# Patient Record
Sex: MALE | Race: WHITE | Employment: UNEMPLOYED | URBAN - METROPOLITAN AREA
[De-identification: names, ages, dates, MRNs, and addresses within clinical notes are randomized per-mention and may not be internally consistent; named-entity substitution may affect disease eponyms.]

---

## 2021-11-30 ENCOUNTER — TELEPHONE (OUTPATIENT)
Dept: FAMILY MEDICINE CLINIC | Facility: CLINIC | Age: 61
End: 2021-11-30

## 2021-11-30 DIAGNOSIS — R73.01 ELEVATED FASTING BLOOD SUGAR: Primary | ICD-10-CM

## 2021-12-01 ENCOUNTER — OFFICE VISIT (OUTPATIENT)
Dept: FAMILY MEDICINE CLINIC | Facility: CLINIC | Age: 61
End: 2021-12-01
Payer: COMMERCIAL

## 2021-12-01 ENCOUNTER — HOSPITAL ENCOUNTER (OUTPATIENT)
Dept: GENERAL RADIOLOGY | Facility: HOSPITAL | Age: 61
Discharge: HOME | End: 2021-12-01
Attending: FAMILY MEDICINE
Payer: COMMERCIAL

## 2021-12-01 ENCOUNTER — LAB ENCOUNTER (OUTPATIENT)
Dept: LAB | Facility: HOSPITAL | Age: 61
End: 2021-12-01
Attending: FAMILY MEDICINE
Payer: COMMERCIAL

## 2021-12-01 VITALS
OXYGEN SATURATION: 98 % | WEIGHT: 236 LBS | HEIGHT: 70.5 IN | SYSTOLIC BLOOD PRESSURE: 148 MMHG | BODY MASS INDEX: 33.41 KG/M2 | DIASTOLIC BLOOD PRESSURE: 74 MMHG | TEMPERATURE: 97 F | RESPIRATION RATE: 16 BRPM | HEART RATE: 82 BPM

## 2021-12-01 DIAGNOSIS — Z01.812 PRE-OPERATIVE LABORATORY EXAMINATION: ICD-10-CM

## 2021-12-01 DIAGNOSIS — Z01.818 PREOP EXAMINATION: ICD-10-CM

## 2021-12-01 DIAGNOSIS — M17.12 ARTHRITIS OF LEFT KNEE: ICD-10-CM

## 2021-12-01 DIAGNOSIS — R73.01 ELEVATED FASTING BLOOD SUGAR: ICD-10-CM

## 2021-12-01 DIAGNOSIS — Z01.812 PRE-OPERATIVE LABORATORY EXAMINATION: Primary | ICD-10-CM

## 2021-12-01 PROCEDURE — 86850 RBC ANTIBODY SCREEN: CPT

## 2021-12-01 PROCEDURE — 3077F SYST BP >= 140 MM HG: CPT | Performed by: FAMILY MEDICINE

## 2021-12-01 PROCEDURE — 99203 OFFICE O/P NEW LOW 30 MIN: CPT | Performed by: FAMILY MEDICINE

## 2021-12-01 PROCEDURE — 86900 BLOOD TYPING SEROLOGIC ABO: CPT

## 2021-12-01 PROCEDURE — 83036 HEMOGLOBIN GLYCOSYLATED A1C: CPT

## 2021-12-01 PROCEDURE — 71046 X-RAY EXAM CHEST 2 VIEWS: CPT | Performed by: FAMILY MEDICINE

## 2021-12-01 PROCEDURE — 36415 COLL VENOUS BLD VENIPUNCTURE: CPT

## 2021-12-01 PROCEDURE — 3078F DIAST BP <80 MM HG: CPT | Performed by: FAMILY MEDICINE

## 2021-12-01 PROCEDURE — 81001 URINALYSIS AUTO W/SCOPE: CPT

## 2021-12-01 PROCEDURE — 85610 PROTHROMBIN TIME: CPT

## 2021-12-01 PROCEDURE — 85025 COMPLETE CBC W/AUTO DIFF WBC: CPT

## 2021-12-01 PROCEDURE — 87081 CULTURE SCREEN ONLY: CPT

## 2021-12-01 PROCEDURE — 3008F BODY MASS INDEX DOCD: CPT | Performed by: FAMILY MEDICINE

## 2021-12-01 PROCEDURE — 86901 BLOOD TYPING SEROLOGIC RH(D): CPT

## 2021-12-01 PROCEDURE — 93005 ELECTROCARDIOGRAM TRACING: CPT

## 2021-12-01 PROCEDURE — 85730 THROMBOPLASTIN TIME PARTIAL: CPT

## 2021-12-01 PROCEDURE — 93010 ELECTROCARDIOGRAM REPORT: CPT | Performed by: FAMILY MEDICINE

## 2021-12-01 PROCEDURE — 87086 URINE CULTURE/COLONY COUNT: CPT

## 2021-12-01 PROCEDURE — 87077 CULTURE AEROBIC IDENTIFY: CPT

## 2021-12-01 PROCEDURE — 80053 COMPREHEN METABOLIC PANEL: CPT

## 2021-12-01 RX ORDER — MULTIVIT-MIN/IRON FUM/FOLIC AC 7.5 MG-4
1 TABLET ORAL DAILY
COMMUNITY

## 2021-12-01 RX ORDER — MELOXICAM 15 MG/1
15 TABLET ORAL DAILY
COMMUNITY

## 2021-12-01 NOTE — TELEPHONE ENCOUNTER
Surgery on 12/17/21, Brandon Rashid with Dr. Raul Beatty @ [address] [place] [address]    H&P- complete  Labs- HGB (12.5), RDW-SD (53.6), RDW (16.0), PLT (564), FBS (106), A1C (6.0), MRSA neg, Urine Culture neg, BUN (20), BUN/Creat ratio (27.0), AST (14), albumin (3.0), globulin (5.0), A/G rati

## 2021-12-02 NOTE — H&P
[address] [facility] PRE-OP CLINIC [place]    PRE-OP NOTE    HPI:   I have been consulted by Dr. Thao Scott to see Mark Savage 59year old male for a preoperative evaluation and medical clearance.  Rusty Curiel has a long history of worsening severe degenerative arthritis l on file    Social Determinants of Health  Financial Resource Strain: Not on file  Food Insecurity: Not on file  Transportation Needs: Not on file  Physical Activity: Not on file  Stress: Not on file  Social Connections: Not on file  Intimate Partner Germaine Bigger erythema or exudate. Mouth:  No oral lesions or ulcerations, good dentition. NECK: Supple, no CLAD, no JVD, no carotid bruit, no thyromegaly. SKIN: No rashes, no skin lesion, no bruising, good turgor.   HEART:  Regular rate and rhythm, no murmurs, rubs o thoracic, intra-peritoneal): No  CAD: No  CHF: No  Stroke: No  DM on insulin: No  Serum Creatinine >2 mg/dl: No  Janell Whittaker has an RCRI score that is low risk and is at 0.9% risk for major cardiac event in the perioperative period.      Patient is medically opti

## 2021-12-02 NOTE — H&P (VIEW-ONLY)
[facility]    PRE-OP [facility]    HPI:   I have been consulted by Dr. Neil Babinski to see Tiana Benavidezmikael 59year old male for a preoperative evaluation and medical clearance.  Dayton Casanova has a long history of worsening severe degenerative arthritis l on file    Social Determinants of Health  Financial Resource Strain: Not on file  Food Insecurity: Not on file  Transportation Needs: Not on file  Physical Activity: Not on file  Stress: Not on file  Social Connections: Not on file  Intimate Partner Clotilde Cutler erythema or exudate. Mouth:  No oral lesions or ulcerations, good dentition. NECK: Supple, no CLAD, no JVD, no carotid bruit, no thyromegaly. SKIN: No rashes, no skin lesion, no bruising, good turgor.   HEART:  Regular rate and rhythm, no murmurs, rubs o thoracic, intra-peritoneal): No  CAD: No  CHF: No  Stroke: No  DM on insulin: No  Serum Creatinine >2 mg/dl: No  Sury Gustavo has an RCRI score that is low risk and is at 0.9% risk for major cardiac event in the perioperative period.      Patient is medically opti

## 2021-12-03 NOTE — TELEPHONE ENCOUNTER
Spoke to patient, went over test results and let him know he is clear for surgery per Dr. Jayshree Hanson. Patient needs to find a new PCP and make sure he follows up on A1C, HGB, and PLT count in the next couple of months.  All results mailed to patient's home add

## 2021-12-03 NOTE — TELEPHONE ENCOUNTER
Dr. Aida Muniz, please review:    Labs- HGB (12.5), RDW-SD (53.6), RDW (16.0), PLT (564), FBS (106), A1C (6.0), MRSA neg, Urine Culture neg, BUN (20), BUN/Creat ratio (27.0), AST (14), albumin (3.0), globulin (5.0), A/G ratio (0.6), PTT (35.4),   EKG- WNL  X-

## 2021-12-08 RX ORDER — CEPHALEXIN 500 MG/1
500 CAPSULE ORAL 4 TIMES DAILY
Qty: 36 CAPSULE | Refills: 0 | Status: ON HOLD | OUTPATIENT
Start: 2021-12-08 | End: 2021-12-17 | Stop reason: ALTCHOICE

## 2021-12-08 NOTE — TELEPHONE ENCOUNTER
Mireya Phillips MD  [facility] Pre-Op Clinic Clinical Staff  Pt with positive urine culture. Recommend treating with keflex 500 mg qid for 9 days.

## 2021-12-08 NOTE — TELEPHONE ENCOUNTER
Spoke to patient, let him know to  and start antibiotic for UTI today and he will take until day before surgery. Rx sent. Patient states he stopped Meloxicam two day ago and is only taking Tylenol Extra strength and is icing/elevating.  This is not h

## 2021-12-14 ENCOUNTER — LAB ENCOUNTER (OUTPATIENT)
Dept: LAB | Age: 61
End: 2021-12-14
Attending: STUDENT IN AN ORGANIZED HEALTH CARE EDUCATION/TRAINING PROGRAM
Payer: COMMERCIAL

## 2021-12-14 DIAGNOSIS — Z01.818 PREOP TESTING: ICD-10-CM

## 2021-12-17 ENCOUNTER — ANESTHESIA (OUTPATIENT)
Dept: SURGERY | Facility: HOSPITAL | Age: 61
DRG: 470 | End: 2021-12-17
Payer: COMMERCIAL

## 2021-12-17 ENCOUNTER — ANESTHESIA EVENT (OUTPATIENT)
Dept: SURGERY | Facility: HOSPITAL | Age: 61
DRG: 470 | End: 2021-12-17
Payer: COMMERCIAL

## 2021-12-17 ENCOUNTER — HOSPITAL ENCOUNTER (INPATIENT)
Facility: HOSPITAL | Age: 61
LOS: 1 days | Discharge: HOME | DRG: 470 | End: 2021-12-18
Attending: STUDENT IN AN ORGANIZED HEALTH CARE EDUCATION/TRAINING PROGRAM | Admitting: STUDENT IN AN ORGANIZED HEALTH CARE EDUCATION/TRAINING PROGRAM
Payer: COMMERCIAL

## 2021-12-17 ENCOUNTER — APPOINTMENT (OUTPATIENT)
Dept: GENERAL RADIOLOGY | Facility: HOSPITAL | Age: 61
DRG: 470 | End: 2021-12-17
Attending: STUDENT IN AN ORGANIZED HEALTH CARE EDUCATION/TRAINING PROGRAM
Payer: COMMERCIAL

## 2021-12-17 DIAGNOSIS — Z01.818 PREOP TESTING: Primary | ICD-10-CM

## 2021-12-17 PROBLEM — Z96.652 S/P TOTAL KNEE REPLACEMENT, LEFT: Status: ACTIVE | Noted: 2021-12-17

## 2021-12-17 PROCEDURE — 88305 TISSUE EXAM BY PATHOLOGIST: CPT | Performed by: STUDENT IN AN ORGANIZED HEALTH CARE EDUCATION/TRAINING PROGRAM

## 2021-12-17 PROCEDURE — 73560 X-RAY EXAM OF KNEE 1 OR 2: CPT | Performed by: STUDENT IN AN ORGANIZED HEALTH CARE EDUCATION/TRAINING PROGRAM

## 2021-12-17 PROCEDURE — 88311 DECALCIFY TISSUE: CPT | Performed by: STUDENT IN AN ORGANIZED HEALTH CARE EDUCATION/TRAINING PROGRAM

## 2021-12-17 PROCEDURE — 0SRD0J9 REPLACEMENT OF LEFT KNEE JOINT WITH SYNTHETIC SUBSTITUTE, CEMENTED, OPEN APPROACH: ICD-10-PCS | Performed by: STUDENT IN AN ORGANIZED HEALTH CARE EDUCATION/TRAINING PROGRAM

## 2021-12-17 DEVICE — REFOBACIN BC R 1X40 US: Type: IMPLANTABLE DEVICE | Status: FUNCTIONAL

## 2021-12-17 DEVICE — IMPLANTABLE DEVICE: Type: IMPLANTABLE DEVICE | Status: FUNCTIONAL

## 2021-12-17 RX ORDER — CELECOXIB 200 MG/1
200 CAPSULE ORAL 2 TIMES DAILY
Status: DISCONTINUED | OUTPATIENT
Start: 2021-12-17 | End: 2021-12-18

## 2021-12-17 RX ORDER — TRANEXAMIC ACID 10 MG/ML
INJECTION, SOLUTION INTRAVENOUS AS NEEDED
Status: DISCONTINUED | OUTPATIENT
Start: 2021-12-17 | End: 2021-12-17 | Stop reason: SURG

## 2021-12-17 RX ORDER — FAMOTIDINE 20 MG/1
20 TABLET ORAL ONCE
Status: COMPLETED | OUTPATIENT
Start: 2021-12-17 | End: 2021-12-17

## 2021-12-17 RX ORDER — SODIUM CHLORIDE 9 MG/ML
INJECTION, SOLUTION INTRAVENOUS CONTINUOUS
Status: DISCONTINUED | OUTPATIENT
Start: 2021-12-17 | End: 2021-12-18

## 2021-12-17 RX ORDER — ZOLPIDEM TARTRATE 5 MG/1
5 TABLET ORAL NIGHTLY PRN
Status: DISCONTINUED | OUTPATIENT
Start: 2021-12-17 | End: 2021-12-18

## 2021-12-17 RX ORDER — SCOLOPAMINE TRANSDERMAL SYSTEM 1 MG/1
1 PATCH, EXTENDED RELEASE TRANSDERMAL ONCE
Status: DISCONTINUED | OUTPATIENT
Start: 2021-12-17 | End: 2021-12-18

## 2021-12-17 RX ORDER — DIPHENHYDRAMINE HYDROCHLORIDE 50 MG/ML
12.5 INJECTION INTRAMUSCULAR; INTRAVENOUS EVERY 4 HOURS PRN
Status: DISCONTINUED | OUTPATIENT
Start: 2021-12-12 | End: 2021-12-18

## 2021-12-17 RX ORDER — HALOPERIDOL 5 MG/ML
0.25 INJECTION INTRAMUSCULAR ONCE AS NEEDED
Status: DISCONTINUED | OUTPATIENT
Start: 2021-12-17 | End: 2021-12-17 | Stop reason: HOSPADM

## 2021-12-17 RX ORDER — HYDROMORPHONE HYDROCHLORIDE 1 MG/ML
0.2 INJECTION, SOLUTION INTRAMUSCULAR; INTRAVENOUS; SUBCUTANEOUS EVERY 5 MIN PRN
Status: DISCONTINUED | OUTPATIENT
Start: 2021-12-17 | End: 2021-12-17 | Stop reason: HOSPADM

## 2021-12-17 RX ORDER — HYDROMORPHONE HYDROCHLORIDE 1 MG/ML
0.8 INJECTION, SOLUTION INTRAMUSCULAR; INTRAVENOUS; SUBCUTANEOUS EVERY 2 HOUR PRN
Status: DISCONTINUED | OUTPATIENT
Start: 2021-12-12 | End: 2021-12-18

## 2021-12-17 RX ORDER — CELECOXIB 200 MG/1
400 CAPSULE ORAL ONCE
Status: COMPLETED | OUTPATIENT
Start: 2021-12-17 | End: 2021-12-17

## 2021-12-17 RX ORDER — ASPIRIN 81 MG/1
81 TABLET ORAL 2 TIMES DAILY
Status: DISCONTINUED | OUTPATIENT
Start: 2021-12-17 | End: 2021-12-18

## 2021-12-17 RX ORDER — PROCHLORPERAZINE EDISYLATE 5 MG/ML
10 INJECTION INTRAMUSCULAR; INTRAVENOUS EVERY 6 HOURS PRN
Status: DISCONTINUED | OUTPATIENT
Start: 2021-12-17 | End: 2021-12-18

## 2021-12-17 RX ORDER — CEFAZOLIN SODIUM/WATER 2 G/20 ML
2 SYRINGE (ML) INTRAVENOUS ONCE
Status: COMPLETED | OUTPATIENT
Start: 2021-12-17 | End: 2021-12-17

## 2021-12-17 RX ORDER — HYDROMORPHONE HYDROCHLORIDE 1 MG/ML
0.4 INJECTION, SOLUTION INTRAMUSCULAR; INTRAVENOUS; SUBCUTANEOUS EVERY 5 MIN PRN
Status: DISCONTINUED | OUTPATIENT
Start: 2021-12-17 | End: 2021-12-17 | Stop reason: HOSPADM

## 2021-12-17 RX ORDER — ONDANSETRON 2 MG/ML
4 INJECTION INTRAMUSCULAR; INTRAVENOUS ONCE AS NEEDED
Status: DISCONTINUED | OUTPATIENT
Start: 2021-12-17 | End: 2021-12-17 | Stop reason: HOSPADM

## 2021-12-17 RX ORDER — MORPHINE SULFATE 10 MG/ML
6 INJECTION, SOLUTION INTRAMUSCULAR; INTRAVENOUS EVERY 10 MIN PRN
Status: DISCONTINUED | OUTPATIENT
Start: 2021-12-17 | End: 2021-12-17 | Stop reason: HOSPADM

## 2021-12-17 RX ORDER — BISACODYL 10 MG
10 SUPPOSITORY, RECTAL RECTAL
Status: DISCONTINUED | OUTPATIENT
Start: 2021-12-17 | End: 2021-12-18

## 2021-12-17 RX ORDER — HYDROMORPHONE HYDROCHLORIDE 1 MG/ML
0.4 INJECTION, SOLUTION INTRAMUSCULAR; INTRAVENOUS; SUBCUTANEOUS EVERY 2 HOUR PRN
Status: DISCONTINUED | OUTPATIENT
Start: 2021-12-12 | End: 2021-12-18

## 2021-12-17 RX ORDER — OXYCODONE HYDROCHLORIDE 5 MG/1
10 TABLET ORAL EVERY 4 HOURS PRN
Status: DISCONTINUED | OUTPATIENT
Start: 2021-12-12 | End: 2021-12-18

## 2021-12-17 RX ORDER — DIPHENHYDRAMINE HCL 25 MG
25 CAPSULE ORAL EVERY 4 HOURS PRN
Status: DISCONTINUED | OUTPATIENT
Start: 2021-12-12 | End: 2021-12-18

## 2021-12-17 RX ORDER — NALOXONE HYDROCHLORIDE 0.4 MG/ML
80 INJECTION, SOLUTION INTRAMUSCULAR; INTRAVENOUS; SUBCUTANEOUS AS NEEDED
Status: DISCONTINUED | OUTPATIENT
Start: 2021-12-17 | End: 2021-12-17 | Stop reason: HOSPADM

## 2021-12-17 RX ORDER — POLYETHYLENE GLYCOL 3350 17 G/17G
17 POWDER, FOR SOLUTION ORAL DAILY PRN
Status: DISCONTINUED | OUTPATIENT
Start: 2021-12-17 | End: 2021-12-18

## 2021-12-17 RX ORDER — HYDROMORPHONE HYDROCHLORIDE 1 MG/ML
0.6 INJECTION, SOLUTION INTRAMUSCULAR; INTRAVENOUS; SUBCUTANEOUS EVERY 5 MIN PRN
Status: DISCONTINUED | OUTPATIENT
Start: 2021-12-17 | End: 2021-12-17 | Stop reason: HOSPADM

## 2021-12-17 RX ORDER — LIDOCAINE HYDROCHLORIDE 10 MG/ML
INJECTION, SOLUTION EPIDURAL; INFILTRATION; INTRACAUDAL; PERINEURAL AS NEEDED
Status: DISCONTINUED | OUTPATIENT
Start: 2021-12-17 | End: 2021-12-17 | Stop reason: SURG

## 2021-12-17 RX ORDER — HYDROCODONE BITARTRATE AND ACETAMINOPHEN 5; 325 MG/1; MG/1
2 TABLET ORAL AS NEEDED
Status: DISCONTINUED | OUTPATIENT
Start: 2021-12-17 | End: 2021-12-17 | Stop reason: HOSPADM

## 2021-12-17 RX ORDER — SODIUM CHLORIDE, SODIUM LACTATE, POTASSIUM CHLORIDE, CALCIUM CHLORIDE 600; 310; 30; 20 MG/100ML; MG/100ML; MG/100ML; MG/100ML
INJECTION, SOLUTION INTRAVENOUS CONTINUOUS
Status: DISCONTINUED | OUTPATIENT
Start: 2021-12-17 | End: 2021-12-18

## 2021-12-17 RX ORDER — MORPHINE SULFATE 4 MG/ML
2 INJECTION, SOLUTION INTRAMUSCULAR; INTRAVENOUS EVERY 10 MIN PRN
Status: DISCONTINUED | OUTPATIENT
Start: 2021-12-17 | End: 2021-12-17 | Stop reason: HOSPADM

## 2021-12-17 RX ORDER — SODIUM CHLORIDE, SODIUM LACTATE, POTASSIUM CHLORIDE, CALCIUM CHLORIDE 600; 310; 30; 20 MG/100ML; MG/100ML; MG/100ML; MG/100ML
INJECTION, SOLUTION INTRAVENOUS CONTINUOUS
Status: DISCONTINUED | OUTPATIENT
Start: 2021-12-17 | End: 2021-12-17 | Stop reason: HOSPADM

## 2021-12-17 RX ORDER — HYDRALAZINE HYDROCHLORIDE 50 MG/1
50 TABLET, FILM COATED ORAL EVERY 8 HOURS PRN
Status: DISCONTINUED | OUTPATIENT
Start: 2021-12-17 | End: 2021-12-18

## 2021-12-17 RX ORDER — SENNOSIDES 8.6 MG
17.2 TABLET ORAL NIGHTLY PRN
Status: DISCONTINUED | OUTPATIENT
Start: 2021-12-17 | End: 2021-12-18

## 2021-12-17 RX ORDER — MIDAZOLAM HYDROCHLORIDE 1 MG/ML
INJECTION INTRAMUSCULAR; INTRAVENOUS AS NEEDED
Status: DISCONTINUED | OUTPATIENT
Start: 2021-12-17 | End: 2021-12-17 | Stop reason: SURG

## 2021-12-17 RX ORDER — BUPIVACAINE HYDROCHLORIDE 7.5 MG/ML
INJECTION, SOLUTION INTRASPINAL AS NEEDED
Status: DISCONTINUED | OUTPATIENT
Start: 2021-12-17 | End: 2021-12-17 | Stop reason: SURG

## 2021-12-17 RX ORDER — ACETAMINOPHEN 500 MG
1000 TABLET ORAL EVERY 6 HOURS
Status: DISCONTINUED | OUTPATIENT
Start: 2021-12-17 | End: 2021-12-18

## 2021-12-17 RX ORDER — SODIUM PHOSPHATE, DIBASIC AND SODIUM PHOSPHATE, MONOBASIC 7; 19 G/133ML; G/133ML
1 ENEMA RECTAL ONCE AS NEEDED
Status: DISCONTINUED | OUTPATIENT
Start: 2021-12-17 | End: 2021-12-18

## 2021-12-17 RX ORDER — OXYCODONE HYDROCHLORIDE 5 MG/1
10 TABLET ORAL ONCE
Status: COMPLETED | OUTPATIENT
Start: 2021-12-17 | End: 2021-12-17

## 2021-12-17 RX ORDER — METOCLOPRAMIDE 10 MG/1
10 TABLET ORAL ONCE
Status: COMPLETED | OUTPATIENT
Start: 2021-12-17 | End: 2021-12-17

## 2021-12-17 RX ORDER — PROCHLORPERAZINE EDISYLATE 5 MG/ML
5 INJECTION INTRAMUSCULAR; INTRAVENOUS ONCE AS NEEDED
Status: DISCONTINUED | OUTPATIENT
Start: 2021-12-17 | End: 2021-12-17 | Stop reason: HOSPADM

## 2021-12-17 RX ORDER — OXYCODONE HYDROCHLORIDE 5 MG/1
5 TABLET ORAL EVERY 4 HOURS PRN
Status: DISCONTINUED | OUTPATIENT
Start: 2021-12-12 | End: 2021-12-18

## 2021-12-17 RX ORDER — CEFAZOLIN SODIUM/WATER 2 G/20 ML
2 SYRINGE (ML) INTRAVENOUS EVERY 8 HOURS
Status: COMPLETED | OUTPATIENT
Start: 2021-12-17 | End: 2021-12-18

## 2021-12-17 RX ORDER — TRANEXAMIC ACID 10 MG/ML
1000 INJECTION, SOLUTION INTRAVENOUS 2 TIMES DAILY
Status: DISCONTINUED | OUTPATIENT
Start: 2021-12-17 | End: 2021-12-17

## 2021-12-17 RX ORDER — ONDANSETRON 2 MG/ML
4 INJECTION INTRAMUSCULAR; INTRAVENOUS EVERY 4 HOURS PRN
Status: DISCONTINUED | OUTPATIENT
Start: 2021-12-17 | End: 2021-12-18

## 2021-12-17 RX ORDER — MORPHINE SULFATE 4 MG/ML
4 INJECTION, SOLUTION INTRAMUSCULAR; INTRAVENOUS EVERY 10 MIN PRN
Status: DISCONTINUED | OUTPATIENT
Start: 2021-12-17 | End: 2021-12-17 | Stop reason: HOSPADM

## 2021-12-17 RX ORDER — DIPHENHYDRAMINE HYDROCHLORIDE 50 MG/ML
25 INJECTION INTRAMUSCULAR; INTRAVENOUS ONCE AS NEEDED
Status: ACTIVE | OUTPATIENT
Start: 2021-12-17 | End: 2021-12-17

## 2021-12-17 RX ORDER — ACETAMINOPHEN 500 MG
1000 TABLET ORAL ONCE
Status: DISCONTINUED | OUTPATIENT
Start: 2021-12-17 | End: 2021-12-17 | Stop reason: ALTCHOICE

## 2021-12-17 RX ORDER — ACETAMINOPHEN 500 MG
1000 TABLET ORAL ONCE
Status: DISCONTINUED | OUTPATIENT
Start: 2021-12-17 | End: 2021-12-17 | Stop reason: HOSPADM

## 2021-12-17 RX ORDER — TRANEXAMIC ACID 10 MG/ML
1000 INJECTION, SOLUTION INTRAVENOUS ONCE
Status: COMPLETED | OUTPATIENT
Start: 2021-12-17 | End: 2021-12-17

## 2021-12-17 RX ORDER — HYDROCODONE BITARTRATE AND ACETAMINOPHEN 5; 325 MG/1; MG/1
1 TABLET ORAL AS NEEDED
Status: DISCONTINUED | OUTPATIENT
Start: 2021-12-17 | End: 2021-12-17 | Stop reason: HOSPADM

## 2021-12-17 RX ADMIN — MIDAZOLAM HYDROCHLORIDE 2 MG: 1 INJECTION INTRAMUSCULAR; INTRAVENOUS at 13:36:00

## 2021-12-17 RX ADMIN — LIDOCAINE HYDROCHLORIDE 30 MG: 10 INJECTION, SOLUTION EPIDURAL; INFILTRATION; INTRACAUDAL; PERINEURAL at 13:38:00

## 2021-12-17 RX ADMIN — BUPIVACAINE HYDROCHLORIDE 1.6 ML: 7.5 INJECTION, SOLUTION INTRASPINAL at 13:38:00

## 2021-12-17 RX ADMIN — TRANEXAMIC ACID 1000 MG: 10 INJECTION, SOLUTION INTRAVENOUS at 13:33:00

## 2021-12-17 RX ADMIN — CEFAZOLIN SODIUM/WATER 2 G: 2 G/20 ML SYRINGE (ML) INTRAVENOUS at 13:50:00

## 2021-12-17 NOTE — ANESTHESIA PROCEDURE NOTES
Spinal Block  Performed by: Reese Pena CRNA  Authorized by: Flakito Aparicio MD       General Information and Staff    Start Time:  12/17/2021 1:36 PM  End Time:  12/17/2021 1:38 PM  Anesthesiologist:  Flakito Aparicio MD  CRNA:  Yaakov Washington

## 2021-12-17 NOTE — PROGRESS NOTES
[facility] HOSP - [facility]    Progress Note    Deo Savage Patient Status:  Inpatient    1960 MRN [id number]   Location [address] UNIT Attending Marci Todd MD   Hosp Day # 0 PCP Unknown Pcp       Subjective: Plan:     Arthritis of left knee  Now replaced      S/P total knee replacement, left  Stable post op.  Aspirin for anticoagulation        Postoperative Recommendations:  Anticoagulation / DVT prophylaxis: SCDs, ASA per Dr. Oralee Apley  Pain Control: per ortho  G

## 2021-12-17 NOTE — INTERVAL H&P NOTE
Pre-op Diagnosis: left knee osteoarthritis    The above referenced H&P was reviewed by Paolo Luo MD on 12/17/2021, the patient was examined and no significant changes have occurred in the patient's condition since the H&P was performed.   I discus

## 2021-12-17 NOTE — ANESTHESIA POSTPROCEDURE EVALUATION
Patient: Jerardo Gonzalez    Procedure Summary     Date: 12/17/21 Room / Location: [facility] MAIN OR 02 / [facility] MAIN OR    Anesthesia Start: 1333 Anesthesia Stop: 0869    Procedure: left total knee arthroplasty (Left Knee) Diagnosis: (left knee osteoarthritis)

## 2021-12-17 NOTE — OPERATIVE REPORT
[facility] at [address]: 12/17/2021    PREOPERATIVE DIAGNOSIS:   1. Left knee osteoarthritis    POST-OPERATIVE DIAGNOSIS:   1. Left knee osteoarthritis    PROCEDURE:  1.  Left primary total knee arthroplasty with pa extremity was exsanguinated using Esmarch bandage and the tourniquet was raised to 300mmHg. A midline incision was made medial to the tibial tubercle centered over patella taken down to the joint capsule of the knee.  Minimal subfascial flaps were create femur was appropriately sized based on posterior referencing. A tensioner device was inserted into the flexion space and tensioned at 90 degrees, establishing appropriate femoral external rotation, and the posterior referencing holes were drilled.  The ante (tibia, femur then patella. The knee was reduced with a trial insert in place and the cement was allowed to cure. Any excess cement was removed to minimize third body wear.  Once the cement cured, the tourniquet was released meticulous hemostasis was obtain

## 2021-12-17 NOTE — ANESTHESIA PREPROCEDURE EVALUATION
Anesthesia PreOp Note    HPI:     Raul Valera is a 59year old male who presents for preoperative consultation requested by: Charli Orozco MD    Date of Surgery: 12/17/2021    Procedure(s):  left total knee arthroplasty  Indication: left knee ost MD  clonidine/epinephrine/ropivacaine/ketorolac (CERTS) pain cocktail, , Intra-articular, Once (Intra-Op), Lenka Mcghee MD  Povidone-Iodine 10 % 17.5 mL in sodium chloride 0.9 % 500 mL irrigation, , Irrigation, Once (Intra-Op), Lenka Mcghee MD Lab Results   Component Value Date    INR 1.13 12/01/2021       Vital Signs: Body mass index is 33.24 kg/m². height is 1.791 m (5' 10.5\") and weight is 106.6 kg (235 lb). His oral temperature is 98.4 °F (36.9 °C).  His blood pressure is 171/78 (abno

## 2021-12-18 VITALS
TEMPERATURE: 99 F | RESPIRATION RATE: 18 BRPM | OXYGEN SATURATION: 92 % | BODY MASS INDEX: 33.27 KG/M2 | SYSTOLIC BLOOD PRESSURE: 137 MMHG | HEIGHT: 70.5 IN | WEIGHT: 235 LBS | DIASTOLIC BLOOD PRESSURE: 80 MMHG | HEART RATE: 97 BPM

## 2021-12-18 PROBLEM — Z01.818 PREOP TESTING: Status: ACTIVE | Noted: 2021-12-18

## 2021-12-18 PROCEDURE — 97161 PT EVAL LOW COMPLEX 20 MIN: CPT

## 2021-12-18 PROCEDURE — 97116 GAIT TRAINING THERAPY: CPT

## 2021-12-18 PROCEDURE — 97535 SELF CARE MNGMENT TRAINING: CPT

## 2021-12-18 PROCEDURE — 85027 COMPLETE CBC AUTOMATED: CPT | Performed by: FAMILY MEDICINE

## 2021-12-18 PROCEDURE — 97165 OT EVAL LOW COMPLEX 30 MIN: CPT

## 2021-12-18 PROCEDURE — 90471 IMMUNIZATION ADMIN: CPT

## 2021-12-18 PROCEDURE — 80048 BASIC METABOLIC PNL TOTAL CA: CPT | Performed by: FAMILY MEDICINE

## 2021-12-18 RX ORDER — ASPIRIN 81 MG/1
81 TABLET ORAL 2 TIMES DAILY
Qty: 100 TABLET | Refills: 0 | Status: SHIPPED | OUTPATIENT
Start: 2021-12-18 | End: 2021-12-18

## 2021-12-18 RX ORDER — OXYCODONE HYDROCHLORIDE 10 MG/1
10 TABLET ORAL EVERY 4 HOURS PRN
Qty: 40 TABLET | Refills: 0 | Status: SHIPPED | OUTPATIENT
Start: 2021-12-18

## 2021-12-18 RX ORDER — ASPIRIN 81 MG/1
162 TABLET ORAL 2 TIMES DAILY
Qty: 120 TABLET | Refills: 0 | Status: SHIPPED | OUTPATIENT
Start: 2021-12-18

## 2021-12-18 RX ORDER — NALOXONE HYDROCHLORIDE 4 MG/.1ML
4 SPRAY, METERED NASAL AS NEEDED
Qty: 1 KIT | Refills: 0 | Status: SHIPPED | OUTPATIENT
Start: 2021-12-18

## 2021-12-18 NOTE — PHYSICAL THERAPY NOTE
PHYSICAL THERAPY KNEE EVALUATION - INPATIENT       Room Number: 404/404-A  Evaluation Date: 12/18/2021  Type of Evaluation: Initial  Physician Order: PT Eval and Treat    Presenting Problem: L TKR      Reason for Therapy: Mobility Dysfunction and Discharge training;Family education;Range of motion;Strengthening;Transfer training;Balance training;Stoop training;Stair training  Rehab Potential : Good  Frequency (Obs): Daily       PHYSICAL THERAPY MEDICAL/SOCIAL HISTORY     History related to current admission: '6-Clicks' INPATIENT SHORT FORM - BASIC MOBILITY  How much difficulty does the patient currently have. ..   Patient Difficulty: Turning over in bed (including adjusting bedclothes, sheets and blankets)?: None   Patient Difficulty: Sitting down on and standin able to demonstrate transfers Sit to/from Stand at assistance level: modified independent     Goal #2  Current Status SBA with RW   Goal #3 Patient is able to ambulate 300 feet with assistive device at assistance level: modified independent    Goal #3   Cu

## 2021-12-18 NOTE — PLAN OF CARE
Pt is aox4, resting in the bed after surgery. BP elevated. Dr Collette Later notified. See prn orders. Check for void. SCD for DVT prophylaxis. Dressing DCI. Tylenol for pain. Disease process discussed with pt.  Bed in lowest position, call light and personal po

## 2021-12-18 NOTE — PROGRESS NOTES
Orthopedic Adult Reconstruction Progress Note    Subjective:  S/p L TKA  No events overnight  Pain well controlled    Objective:  Left knee focused exam  Dressing place  No saturation  Leg elevated  5/5 TA EHL FHL GS peroneals  SILT tibia sural saph DP SP

## 2021-12-18 NOTE — PROGRESS NOTES
[facility]    Progress Note    [place] Savage Patient Status:  Inpatient    1960 MRN [id number]   Location [facility] 4W/SW/SE Attending Abbey Holman MD   Hosp Day # 1 PCP Unknown Pcp       Subjective:   Ally Mireles PM                Assessment and Plan:     Arthritis of left knee  Now replaced      S/P total knee replacement, left  Stable for discharge.  Aspirin for anticoagulation      Postoperative Recommendations:  Anticoagulation / DVT prophylaxis: Yumi,  m

## 2021-12-18 NOTE — OCCUPATIONAL THERAPY NOTE
OCCUPATIONAL THERAPY EVALUATION - INPATIENT     Room Number: 404/404-A  Evaluation Date: 12/18/2021  Type of Evaluation: Initial       Physician Order: IP Consult to Occupational Therapy  Reason for Therapy: ADL/IADL Dysfunction and Discharge Planning admission.     OCCUPATIONAL THERAPY MEDICAL/SOCIAL HISTORY     Problem List  Active Problems:    Arthritis of left knee    S/P total knee replacement, left    Preop testing      Past Medical History  Past Medical History:   Diagnosis Date   • Osteoarthritis Toileting, which includes using toilet, bedpan or urinal? : A Little  -   Putting on and taking off regular upper body clothing?: None  -   Taking care of personal grooming such as brushing teeth?: None  -   Eating meals?: None    AM-PAC Score:  Score: 21

## 2021-12-18 NOTE — PLAN OF CARE
Josh Mustafa and his wife have been updated on the discharge plan- home today w Outpt PT. reviewed discharge instructions with patient and wife- educated on pain med side effects and schedule. Incision care and s/s infection reviewed. all quest answered.  Saline l Manage/alleviate anxiety  - Utilize distraction and/or relaxation techniques  - Monitor for opioid side effects  - Notify MD/LIP if interventions unsuccessful or patient reports new pain  - Anticipate increased pain with activity and pre-medicate as approp

## 2021-12-18 NOTE — PLAN OF CARE
Problem: Patient Centered Care  Goal: Patient preferences are identified and integrated in the patient's plan of care  Description: Interventions:  - What would you like us to know as we care for you?   - Provide timely, complete, and accurate informatio evaluate response  - Implement non-pharmacological measures as appropriate and evaluate response  - Consider cultural and social influences on pain and pain management  - Manage/alleviate anxiety  - Utilize distraction and/or relaxation techniques  - Monit

## 2021-12-18 NOTE — CM/SW NOTE
12/18/21 1100   CM/SW Referral Data   Referral Source Physician   Reason for Referral Discharge planning   Informant Patient;Spouse/Significant Other   Pertinent Medical Hx   Does patient have an established PCP?    (Not on staff)   Significant Past Medi

## 2021-12-18 NOTE — CM/SW NOTE
12/18/21 1100   Discharge disposition   Expected discharge disposition Home or Self   Outpatient services Outpatient rehab services   Discharge transportation Private car     Pt discussed during nursing rounds. Pt is stable for dc today.  MD dc order ent

## 2022-03-06 ENCOUNTER — HOSPITAL ENCOUNTER (EMERGENCY)
Facility: HOSPITAL | Age: 62
Discharge: HOME | End: 2022-03-06
Attending: EMERGENCY MEDICINE
Payer: COMMERCIAL

## 2022-03-06 ENCOUNTER — APPOINTMENT (OUTPATIENT)
Dept: GENERAL RADIOLOGY | Facility: HOSPITAL | Age: 62
End: 2022-03-06
Attending: EMERGENCY MEDICINE
Payer: COMMERCIAL

## 2022-03-06 VITALS
HEIGHT: 70 IN | OXYGEN SATURATION: 97 % | RESPIRATION RATE: 20 BRPM | SYSTOLIC BLOOD PRESSURE: 138 MMHG | HEART RATE: 75 BPM | BODY MASS INDEX: 34.36 KG/M2 | TEMPERATURE: 97 F | DIASTOLIC BLOOD PRESSURE: 70 MMHG | WEIGHT: 240 LBS

## 2022-03-06 DIAGNOSIS — S43.004A DISLOCATION OF RIGHT SHOULDER JOINT, INITIAL ENCOUNTER: Primary | ICD-10-CM

## 2022-03-06 PROCEDURE — 96375 TX/PRO/DX INJ NEW DRUG ADDON: CPT

## 2022-03-06 PROCEDURE — 99285 EMERGENCY DEPT VISIT HI MDM: CPT

## 2022-03-06 PROCEDURE — 96374 THER/PROPH/DIAG INJ IV PUSH: CPT

## 2022-03-06 PROCEDURE — 73030 X-RAY EXAM OF SHOULDER: CPT | Performed by: EMERGENCY MEDICINE

## 2022-03-06 PROCEDURE — 23650 CLTX SHO DSLC W/MNPJ WO ANES: CPT

## 2022-03-06 RX ORDER — KETAMINE HYDROCHLORIDE 50 MG/ML
0.5 INJECTION, SOLUTION, CONCENTRATE INTRAMUSCULAR; INTRAVENOUS ONCE
Status: COMPLETED | OUTPATIENT
Start: 2022-03-06 | End: 2022-03-06

## 2022-03-06 RX ORDER — MORPHINE SULFATE 4 MG/ML
4 INJECTION, SOLUTION INTRAMUSCULAR; INTRAVENOUS ONCE
Status: COMPLETED | OUTPATIENT
Start: 2022-03-06 | End: 2022-03-06

## 2022-03-06 RX ORDER — HYDROCODONE BITARTRATE AND ACETAMINOPHEN 5; 325 MG/1; MG/1
1 TABLET ORAL EVERY 6 HOURS PRN
Qty: 10 TABLET | Refills: 0 | Status: SHIPPED | OUTPATIENT
Start: 2022-03-06 | End: 2022-03-13

## 2022-03-06 NOTE — ED INITIAL ASSESSMENT (HPI)
Pt brought in by [place] [facility]. Pt states he rolled over his bed and fell on the floor. Per medic pt has deformity to right shoulder. Pt states he hit head denies loc of taking blood thinners. Pt states he had knee sx in December.

## 2022-03-15 ENCOUNTER — TELEPHONE (OUTPATIENT)
Dept: FAMILY MEDICINE CLINIC | Facility: CLINIC | Age: 62
End: 2022-03-15

## 2022-03-15 ENCOUNTER — HOSPITAL ENCOUNTER (OUTPATIENT)
Dept: CT IMAGING | Age: 62
Discharge: HOME | End: 2022-03-15
Attending: STUDENT IN AN ORGANIZED HEALTH CARE EDUCATION/TRAINING PROGRAM
Payer: COMMERCIAL

## 2022-03-15 DIAGNOSIS — M25.531 PAIN IN RIGHT WRIST: ICD-10-CM

## 2022-03-15 PROCEDURE — 73200 CT UPPER EXTREMITY W/O DYE: CPT | Performed by: STUDENT IN AN ORGANIZED HEALTH CARE EDUCATION/TRAINING PROGRAM

## 2022-03-16 ENCOUNTER — OFFICE VISIT (OUTPATIENT)
Dept: FAMILY MEDICINE CLINIC | Facility: CLINIC | Age: 62
End: 2022-03-16
Payer: COMMERCIAL

## 2022-03-16 ENCOUNTER — LAB ENCOUNTER (OUTPATIENT)
Dept: LAB | Facility: HOSPITAL | Age: 62
End: 2022-03-16
Attending: FAMILY MEDICINE
Payer: COMMERCIAL

## 2022-03-16 ENCOUNTER — HOSPITAL ENCOUNTER (OUTPATIENT)
Dept: ULTRASOUND IMAGING | Facility: HOSPITAL | Age: 62
Discharge: HOME | End: 2022-03-16
Attending: FAMILY MEDICINE
Payer: COMMERCIAL

## 2022-03-16 VITALS
HEART RATE: 78 BPM | WEIGHT: 243 LBS | BODY MASS INDEX: 34.79 KG/M2 | HEIGHT: 70 IN | RESPIRATION RATE: 16 BRPM | SYSTOLIC BLOOD PRESSURE: 140 MMHG | DIASTOLIC BLOOD PRESSURE: 82 MMHG | OXYGEN SATURATION: 97 % | TEMPERATURE: 98 F

## 2022-03-16 DIAGNOSIS — S43.004D CLOSED DISLOCATION OF RIGHT SHOULDER, SUBSEQUENT ENCOUNTER: ICD-10-CM

## 2022-03-16 DIAGNOSIS — M17.11 ARTHRITIS OF RIGHT KNEE: ICD-10-CM

## 2022-03-16 DIAGNOSIS — R60.0 ARM EDEMA: ICD-10-CM

## 2022-03-16 DIAGNOSIS — Z01.812 PRE-OPERATIVE LABORATORY EXAMINATION: ICD-10-CM

## 2022-03-16 DIAGNOSIS — M25.511 ACUTE PAIN OF RIGHT SHOULDER: ICD-10-CM

## 2022-03-16 DIAGNOSIS — Z01.812 PRE-OPERATIVE LABORATORY EXAMINATION: Primary | ICD-10-CM

## 2022-03-16 DIAGNOSIS — Z01.818 PREOP EXAMINATION: ICD-10-CM

## 2022-03-16 DIAGNOSIS — R73.01 ELEVATED FASTING BLOOD SUGAR: ICD-10-CM

## 2022-03-16 PROBLEM — S42.91XG: Status: ACTIVE | Noted: 2022-03-16

## 2022-03-16 LAB
ALBUMIN SERPL-MCNC: 3.4 G/DL (ref 3.4–5)
ALBUMIN/GLOB SERPL: 0.8 {RATIO} (ref 1–2)
ALP LIVER SERPL-CCNC: 80 U/L
ALT SERPL-CCNC: 24 U/L
ANION GAP SERPL CALC-SCNC: 6 MMOL/L (ref 0–18)
ANTIBODY SCREEN: NEGATIVE
AST SERPL-CCNC: 19 U/L (ref 15–37)
BASOPHILS # BLD AUTO: 0.05 X10(3) UL (ref 0–0.2)
BASOPHILS NFR BLD AUTO: 0.6 %
BILIRUB SERPL-MCNC: 0.7 MG/DL (ref 0.1–2)
BILIRUB UR QL: NEGATIVE
BUN BLD-MCNC: 21 MG/DL (ref 7–18)
BUN/CREAT SERPL: 28.4 (ref 10–20)
CALCIUM BLD-MCNC: 9.3 MG/DL (ref 8.5–10.1)
CHLORIDE SERPL-SCNC: 101 MMOL/L (ref 98–112)
CLARITY UR: CLEAR
CO2 SERPL-SCNC: 28 MMOL/L (ref 21–32)
COLOR UR: YELLOW
CREAT BLD-MCNC: 0.74 MG/DL
DEPRECATED RDW RBC AUTO: 52.7 FL (ref 35.1–46.3)
EOSINOPHIL # BLD AUTO: 0.28 X10(3) UL (ref 0–0.7)
EOSINOPHIL NFR BLD AUTO: 3.2 %
ERYTHROCYTE [DISTWIDTH] IN BLOOD BY AUTOMATED COUNT: 16.2 % (ref 11–15)
EST. AVERAGE GLUCOSE BLD GHB EST-MCNC: 134 MG/DL (ref 68–126)
FASTING STATUS PATIENT QL REPORTED: YES
GLOBULIN PLAS-MCNC: 4.4 G/DL (ref 2.8–4.4)
GLUCOSE BLD-MCNC: 99 MG/DL (ref 70–99)
GLUCOSE UR-MCNC: NEGATIVE MG/DL
HBA1C MFR BLD: 6.3 % (ref ?–5.7)
HCT VFR BLD AUTO: 40.3 %
HGB BLD-MCNC: 12.4 G/DL
IMM GRANULOCYTES # BLD AUTO: 0.04 X10(3) UL (ref 0–1)
IMM GRANULOCYTES NFR BLD: 0.5 %
KETONES UR-MCNC: NEGATIVE MG/DL
LEUKOCYTE ESTERASE UR QL STRIP.AUTO: NEGATIVE
LYMPHOCYTES # BLD AUTO: 1.62 X10(3) UL (ref 1–4)
LYMPHOCYTES NFR BLD AUTO: 18.5 %
MCH RBC QN AUTO: 27.3 PG (ref 26–34)
MCHC RBC AUTO-ENTMCNC: 30.8 G/DL (ref 31–37)
MCV RBC AUTO: 88.6 FL
MONOCYTES # BLD AUTO: 1.05 X10(3) UL (ref 0.1–1)
MONOCYTES NFR BLD AUTO: 12 %
NEUTROPHILS # BLD AUTO: 5.71 X10 (3) UL (ref 1.5–7.7)
NEUTROPHILS # BLD AUTO: 5.71 X10(3) UL (ref 1.5–7.7)
NEUTROPHILS NFR BLD AUTO: 65.2 %
NITRITE UR QL STRIP.AUTO: NEGATIVE
OSMOLALITY SERPL CALC.SUM OF ELEC: 283 MOSM/KG (ref 275–295)
PH UR: 5 [PH] (ref 5–8)
PLATELET # BLD AUTO: 627 10(3)UL (ref 150–450)
POTASSIUM SERPL-SCNC: 4.6 MMOL/L (ref 3.5–5.1)
PROT SERPL-MCNC: 7.8 G/DL (ref 6.4–8.2)
PROT UR-MCNC: NEGATIVE MG/DL
RBC # BLD AUTO: 4.55 X10(6)UL
RH BLOOD TYPE: POSITIVE
SODIUM SERPL-SCNC: 135 MMOL/L (ref 136–145)
SP GR UR STRIP: 1.02 (ref 1–1.03)
UROBILINOGEN UR STRIP-ACNC: <2
VIT C UR-MCNC: NEGATIVE MG/DL
WBC # BLD AUTO: 8.8 X10(3) UL (ref 4–11)

## 2022-03-16 PROCEDURE — 85025 COMPLETE CBC W/AUTO DIFF WBC: CPT

## 2022-03-16 PROCEDURE — 80053 COMPREHEN METABOLIC PANEL: CPT

## 2022-03-16 PROCEDURE — 86850 RBC ANTIBODY SCREEN: CPT

## 2022-03-16 PROCEDURE — 3077F SYST BP >= 140 MM HG: CPT | Performed by: FAMILY MEDICINE

## 2022-03-16 PROCEDURE — 86900 BLOOD TYPING SEROLOGIC ABO: CPT

## 2022-03-16 PROCEDURE — 81001 URINALYSIS AUTO W/SCOPE: CPT

## 2022-03-16 PROCEDURE — 3079F DIAST BP 80-89 MM HG: CPT | Performed by: FAMILY MEDICINE

## 2022-03-16 PROCEDURE — 83036 HEMOGLOBIN GLYCOSYLATED A1C: CPT

## 2022-03-16 PROCEDURE — 36415 COLL VENOUS BLD VENIPUNCTURE: CPT

## 2022-03-16 PROCEDURE — 93971 EXTREMITY STUDY: CPT | Performed by: FAMILY MEDICINE

## 2022-03-16 PROCEDURE — 99213 OFFICE O/P EST LOW 20 MIN: CPT | Performed by: FAMILY MEDICINE

## 2022-03-16 PROCEDURE — 86901 BLOOD TYPING SEROLOGIC RH(D): CPT

## 2022-03-16 PROCEDURE — 3008F BODY MASS INDEX DOCD: CPT | Performed by: FAMILY MEDICINE

## 2022-03-16 PROCEDURE — 87081 CULTURE SCREEN ONLY: CPT

## 2022-03-16 NOTE — TELEPHONE ENCOUNTER
Surgery on 03/25/22, RTKA with Dr. My Light @ [address] [place] [address]    H&P- complete  Labs- HGB (12.4),  MCHC (30.8), RDW-SD (52.7), RDW (16.2), PLT (627.0), Monocyte absolute (1.05),  Sodium (135), BUN (21), BUN/Creat ratio (28.4), A/G ratio (0.8), A1C (6.3), RBC's/blood in urine- not sent to culture, MRSA neg, all other labs WNL  EKG- done 12/01/21  Venous Doppler of right arm 03/16/22- No DVT in the right upper extremity. Probable 1.3 x 0.9 x 1.4 cm lymph node at the medial right elbow. Pt last saw Harmeet Capellan- for Anaya May Dr. My Light 12/17/21    Pt fell out of bed 1 week ago. He went to ER to get right shoulder put back into place. His right arm is extremely bruised and his arm and hand swollen. He is getting MRI 03/17/22 at Dr. Fouzia Pérez office. Per Dr. Al Core- patient needs venous doppler of right arm. Spoke to patient, was able to get him in for venous doppler at 3pm today 03/16/22 at [facility].

## 2022-03-16 NOTE — TELEPHONE ENCOUNTER
Dr. Malika Albarran, please review:    Labs- HGB (12.4),  MCHC (30.8), RDW-SD (52.7), RDW (16.2), PLT (627.0), Monocyte absolute (1.05),  Sodium (135), BUN (21), BUN/Creat ratio (28.4), A/G ratio (0.8), A1C (6.3), RBC's/blood in urine- not sent to culture, MRSA neg, all other labs WNL  EKG- done 12/01/21    Venous Doppler of right arm 03/16/22- No DVT in the right upper extremity. Probable 1.3 x 0.9 x 1.4 cm lymph node at the medial right elbow. OK for surgery?

## 2022-03-17 NOTE — TELEPHONE ENCOUNTER
Autumn Pathak MD  [facility] Pre-Op Clinic Clinical Staff; Lucila Vazquez LPN  Message left on voice mail. Doppler negative. Mildly elevated blood sugar, not diabetic. Follow up with PCP after surgery for this. Also small number of RBCs in urine. He should have a repeat urine test in one week.

## 2022-03-18 NOTE — TELEPHONE ENCOUNTER
Spoke to patient and his wife, went over test results. Patient will get repeat UA on 03/22/22 when he goes in for COVID test.    He had MRI of his shoulder 03/17/22 with Dr. Carole Barthel. Still waiting on results. If shoulder muscle is torn he may need to postpone surgery.

## 2022-03-20 PROBLEM — Z01.818 PRE-OP TESTING: Status: ACTIVE | Noted: 2022-03-20

## 2022-03-22 ENCOUNTER — LAB ENCOUNTER (OUTPATIENT)
Dept: LAB | Age: 62
End: 2022-03-22
Attending: STUDENT IN AN ORGANIZED HEALTH CARE EDUCATION/TRAINING PROGRAM
Payer: COMMERCIAL

## 2022-03-22 DIAGNOSIS — Z01.818 PRE-OP TESTING: ICD-10-CM

## 2022-03-22 DIAGNOSIS — R31.9 HEMATURIA, UNSPECIFIED TYPE: ICD-10-CM

## 2022-03-22 LAB
BILIRUB UR QL: NEGATIVE
COLOR UR: YELLOW
GLUCOSE UR-MCNC: NEGATIVE MG/DL
LEUKOCYTE ESTERASE UR QL STRIP.AUTO: NEGATIVE
NITRITE UR QL STRIP.AUTO: NEGATIVE
PH UR: 5 [PH] (ref 5–8)
PROT UR-MCNC: 30 MG/DL
RBC #/AREA URNS AUTO: >10 /HPF
SP GR UR STRIP: >1.03 (ref 1–1.03)
UROBILINOGEN UR STRIP-ACNC: <2
VIT C UR-MCNC: NEGATIVE MG/DL

## 2022-03-22 PROCEDURE — 81001 URINALYSIS AUTO W/SCOPE: CPT

## 2022-03-23 LAB — SARS-COV-2 RNA RESP QL NAA+PROBE: NOT DETECTED

## 2022-03-23 NOTE — TELEPHONE ENCOUNTER
Spoke to patient, went over results per Dr. Domingo Mora- Patient will call [facility] in [place] [phone] to get an appt 3-4 weeks after knee surgery to investigate the RBC's in his urine. Patient mailed all test results to bring with to appointment. Patient is clear for upcoming knee surgery with Dr. Shaka Bowers per Dr. Domingo Mora.

## 2022-03-23 NOTE — TELEPHONE ENCOUNTER
Pt has RBCs in urine that needs follow up with urology post surgery. Chart and results reviewed and are acceptable for surgery. Pt is medically clear to proceed with surgery as planned.

## 2022-03-25 ENCOUNTER — APPOINTMENT (OUTPATIENT)
Dept: GENERAL RADIOLOGY | Facility: HOSPITAL | Age: 62
DRG: 470 | End: 2022-03-25
Attending: STUDENT IN AN ORGANIZED HEALTH CARE EDUCATION/TRAINING PROGRAM
Payer: COMMERCIAL

## 2022-03-25 ENCOUNTER — ANESTHESIA EVENT (OUTPATIENT)
Dept: SURGERY | Facility: HOSPITAL | Age: 62
DRG: 470 | End: 2022-03-25
Payer: COMMERCIAL

## 2022-03-25 ENCOUNTER — HOSPITAL ENCOUNTER (INPATIENT)
Facility: HOSPITAL | Age: 62
LOS: 3 days | Discharge: HOME | DRG: 470 | End: 2022-03-28
Attending: STUDENT IN AN ORGANIZED HEALTH CARE EDUCATION/TRAINING PROGRAM | Admitting: STUDENT IN AN ORGANIZED HEALTH CARE EDUCATION/TRAINING PROGRAM
Payer: COMMERCIAL

## 2022-03-25 ENCOUNTER — HOSPITAL ENCOUNTER (INPATIENT)
Facility: HOSPITAL | Age: 62
LOS: 3 days | Discharge: HOME HEALTH | DRG: 470 | End: 2022-03-28
Attending: STUDENT IN AN ORGANIZED HEALTH CARE EDUCATION/TRAINING PROGRAM | Admitting: STUDENT IN AN ORGANIZED HEALTH CARE EDUCATION/TRAINING PROGRAM
Payer: COMMERCIAL

## 2022-03-25 ENCOUNTER — ANESTHESIA (OUTPATIENT)
Dept: SURGERY | Facility: HOSPITAL | Age: 62
DRG: 470 | End: 2022-03-25
Payer: COMMERCIAL

## 2022-03-25 DIAGNOSIS — Z01.818 PRE-OP TESTING: Primary | ICD-10-CM

## 2022-03-25 DIAGNOSIS — E87.1 HYPONATREMIA: ICD-10-CM

## 2022-03-25 PROBLEM — Z96.651 S/P TKR (TOTAL KNEE REPLACEMENT), RIGHT: Status: ACTIVE | Noted: 2022-03-25

## 2022-03-25 PROCEDURE — 76942 ECHO GUIDE FOR BIOPSY: CPT | Performed by: STUDENT IN AN ORGANIZED HEALTH CARE EDUCATION/TRAINING PROGRAM

## 2022-03-25 PROCEDURE — 64447 NJX AA&/STRD FEMORAL NRV IMG: CPT | Performed by: STUDENT IN AN ORGANIZED HEALTH CARE EDUCATION/TRAINING PROGRAM

## 2022-03-25 PROCEDURE — 3E0T3BZ INTRODUCTION OF ANESTHETIC AGENT INTO PERIPHERAL NERVES AND PLEXI, PERCUTANEOUS APPROACH: ICD-10-PCS | Performed by: STUDENT IN AN ORGANIZED HEALTH CARE EDUCATION/TRAINING PROGRAM

## 2022-03-25 PROCEDURE — 0SRC0J9 REPLACEMENT OF RIGHT KNEE JOINT WITH SYNTHETIC SUBSTITUTE, CEMENTED, OPEN APPROACH: ICD-10-PCS | Performed by: STUDENT IN AN ORGANIZED HEALTH CARE EDUCATION/TRAINING PROGRAM

## 2022-03-25 PROCEDURE — 73560 X-RAY EXAM OF KNEE 1 OR 2: CPT | Performed by: STUDENT IN AN ORGANIZED HEALTH CARE EDUCATION/TRAINING PROGRAM

## 2022-03-25 PROCEDURE — 88305 TISSUE EXAM BY PATHOLOGIST: CPT | Performed by: STUDENT IN AN ORGANIZED HEALTH CARE EDUCATION/TRAINING PROGRAM

## 2022-03-25 PROCEDURE — 88311 DECALCIFY TISSUE: CPT | Performed by: STUDENT IN AN ORGANIZED HEALTH CARE EDUCATION/TRAINING PROGRAM

## 2022-03-25 DEVICE — REFOBACIN BC R 1X40 US: Type: IMPLANTABLE DEVICE | Site: KNEE | Status: FUNCTIONAL

## 2022-03-25 RX ORDER — CEFAZOLIN SODIUM/WATER 2 G/20 ML
2 SYRINGE (ML) INTRAVENOUS ONCE
Status: COMPLETED | OUTPATIENT
Start: 2022-03-25 | End: 2022-03-25

## 2022-03-25 RX ORDER — FAMOTIDINE 20 MG/1
20 TABLET, FILM COATED ORAL ONCE
Status: COMPLETED | OUTPATIENT
Start: 2022-03-25 | End: 2022-03-25

## 2022-03-25 RX ORDER — DIPHENHYDRAMINE HYDROCHLORIDE 50 MG/ML
12.5 INJECTION INTRAMUSCULAR; INTRAVENOUS EVERY 4 HOURS PRN
Status: DISCONTINUED | OUTPATIENT
Start: 2022-03-20 | End: 2022-03-28

## 2022-03-25 RX ORDER — MORPHINE SULFATE 2 MG/ML
2 INJECTION, SOLUTION INTRAMUSCULAR; INTRAVENOUS EVERY 2 HOUR PRN
Status: ACTIVE | OUTPATIENT
Start: 2022-03-25 | End: 2022-03-26

## 2022-03-25 RX ORDER — HYDROMORPHONE HYDROCHLORIDE 1 MG/ML
0.4 INJECTION, SOLUTION INTRAMUSCULAR; INTRAVENOUS; SUBCUTANEOUS EVERY 5 MIN PRN
Status: DISCONTINUED | OUTPATIENT
Start: 2022-03-25 | End: 2022-03-25 | Stop reason: HOSPADM

## 2022-03-25 RX ORDER — ACETAMINOPHEN 500 MG
1000 TABLET ORAL EVERY 8 HOURS
Status: DISPENSED | OUTPATIENT
Start: 2022-03-25 | End: 2022-03-26

## 2022-03-25 RX ORDER — DIPHENHYDRAMINE HYDROCHLORIDE 50 MG/ML
25 INJECTION INTRAMUSCULAR; INTRAVENOUS ONCE AS NEEDED
Status: ACTIVE | OUTPATIENT
Start: 2022-03-25 | End: 2022-03-25

## 2022-03-25 RX ORDER — ROPIVACAINE HYDROCHLORIDE 5 MG/ML
INJECTION, SOLUTION EPIDURAL; INFILTRATION; PERINEURAL AS NEEDED
Status: DISCONTINUED | OUTPATIENT
Start: 2022-03-25 | End: 2022-03-25 | Stop reason: SURG

## 2022-03-25 RX ORDER — NALOXONE HYDROCHLORIDE 0.4 MG/ML
80 INJECTION, SOLUTION INTRAMUSCULAR; INTRAVENOUS; SUBCUTANEOUS AS NEEDED
Status: DISCONTINUED | OUTPATIENT
Start: 2022-03-25 | End: 2022-03-25 | Stop reason: HOSPADM

## 2022-03-25 RX ORDER — ACETAMINOPHEN 500 MG
1000 TABLET ORAL EVERY 6 HOURS
Status: DISCONTINUED | OUTPATIENT
Start: 2022-03-26 | End: 2022-03-28

## 2022-03-25 RX ORDER — CEFAZOLIN SODIUM/WATER 2 G/20 ML
2 SYRINGE (ML) INTRAVENOUS EVERY 8 HOURS
Status: COMPLETED | OUTPATIENT
Start: 2022-03-26 | End: 2022-03-26

## 2022-03-25 RX ORDER — ACETAMINOPHEN 500 MG
500 TABLET ORAL EVERY 6 HOURS PRN
COMMUNITY

## 2022-03-25 RX ORDER — BUPIVACAINE HYDROCHLORIDE 7.5 MG/ML
INJECTION, SOLUTION INTRASPINAL AS NEEDED
Status: DISCONTINUED | OUTPATIENT
Start: 2022-03-25 | End: 2022-03-25 | Stop reason: SURG

## 2022-03-25 RX ORDER — MORPHINE SULFATE 4 MG/ML
4 INJECTION, SOLUTION INTRAMUSCULAR; INTRAVENOUS EVERY 10 MIN PRN
Status: DISCONTINUED | OUTPATIENT
Start: 2022-03-25 | End: 2022-03-25 | Stop reason: HOSPADM

## 2022-03-25 RX ORDER — MORPHINE SULFATE 4 MG/ML
4 INJECTION, SOLUTION INTRAMUSCULAR; INTRAVENOUS EVERY 2 HOUR PRN
Status: DISPENSED | OUTPATIENT
Start: 2022-03-25 | End: 2022-03-26

## 2022-03-25 RX ORDER — CELECOXIB 200 MG/1
200 CAPSULE ORAL 2 TIMES DAILY
Status: DISCONTINUED | OUTPATIENT
Start: 2022-03-25 | End: 2022-03-26

## 2022-03-25 RX ORDER — ACETAMINOPHEN 500 MG
1000 TABLET ORAL ONCE
Status: DISCONTINUED | OUTPATIENT
Start: 2022-03-25 | End: 2022-03-25

## 2022-03-25 RX ORDER — SODIUM CHLORIDE 9 MG/ML
INJECTION, SOLUTION INTRAVENOUS CONTINUOUS
Status: DISCONTINUED | OUTPATIENT
Start: 2022-03-25 | End: 2022-03-27

## 2022-03-25 RX ORDER — ACETAMINOPHEN 500 MG
1000 TABLET ORAL ONCE
Status: DISCONTINUED | OUTPATIENT
Start: 2022-03-25 | End: 2022-03-25 | Stop reason: HOSPADM

## 2022-03-25 RX ORDER — OXYCODONE HYDROCHLORIDE 5 MG/1
5 TABLET ORAL EVERY 4 HOURS PRN
Status: DISCONTINUED | OUTPATIENT
Start: 2022-03-25 | End: 2022-03-27

## 2022-03-25 RX ORDER — HYDROCODONE BITARTRATE AND ACETAMINOPHEN 5; 325 MG/1; MG/1
2 TABLET ORAL AS NEEDED
Status: DISCONTINUED | OUTPATIENT
Start: 2022-03-25 | End: 2022-03-25 | Stop reason: HOSPADM

## 2022-03-25 RX ORDER — METOCLOPRAMIDE 10 MG/1
10 TABLET ORAL ONCE
Status: COMPLETED | OUTPATIENT
Start: 2022-03-25 | End: 2022-03-25

## 2022-03-25 RX ORDER — LIDOCAINE HYDROCHLORIDE 10 MG/ML
INJECTION, SOLUTION EPIDURAL; INFILTRATION; INTRACAUDAL; PERINEURAL AS NEEDED
Status: DISCONTINUED | OUTPATIENT
Start: 2022-03-25 | End: 2022-03-25 | Stop reason: SURG

## 2022-03-25 RX ORDER — MORPHINE SULFATE 15 MG/1
15 TABLET ORAL EVERY 4 HOURS PRN
Status: DISPENSED | OUTPATIENT
Start: 2022-03-25 | End: 2022-03-26

## 2022-03-25 RX ORDER — OXYCODONE HYDROCHLORIDE 5 MG/1
5 TABLET ORAL EVERY 4 HOURS PRN
Status: ACTIVE | OUTPATIENT
Start: 2022-03-25 | End: 2022-03-26

## 2022-03-25 RX ORDER — PROCHLORPERAZINE EDISYLATE 5 MG/ML
5 INJECTION INTRAMUSCULAR; INTRAVENOUS ONCE AS NEEDED
Status: DISCONTINUED | OUTPATIENT
Start: 2022-03-25 | End: 2022-03-25 | Stop reason: HOSPADM

## 2022-03-25 RX ORDER — OXYCODONE HYDROCHLORIDE 5 MG/1
10 TABLET ORAL EVERY 4 HOURS PRN
Status: DISPENSED | OUTPATIENT
Start: 2022-03-25 | End: 2022-03-26

## 2022-03-25 RX ORDER — OXYCODONE HYDROCHLORIDE 5 MG/1
10 TABLET ORAL ONCE
Status: COMPLETED | OUTPATIENT
Start: 2022-03-25 | End: 2022-03-25

## 2022-03-25 RX ORDER — MIDAZOLAM HYDROCHLORIDE 1 MG/ML
INJECTION INTRAMUSCULAR; INTRAVENOUS AS NEEDED
Status: DISCONTINUED | OUTPATIENT
Start: 2022-03-25 | End: 2022-03-25 | Stop reason: SURG

## 2022-03-25 RX ORDER — ONDANSETRON 2 MG/ML
4 INJECTION INTRAMUSCULAR; INTRAVENOUS ONCE AS NEEDED
Status: DISCONTINUED | OUTPATIENT
Start: 2022-03-25 | End: 2022-03-25 | Stop reason: HOSPADM

## 2022-03-25 RX ORDER — DOCUSATE SODIUM 100 MG/1
100 CAPSULE, LIQUID FILLED ORAL 2 TIMES DAILY
Status: DISCONTINUED | OUTPATIENT
Start: 2022-03-25 | End: 2022-03-28

## 2022-03-25 RX ORDER — TRAMADOL HYDROCHLORIDE 50 MG/1
50 TABLET ORAL EVERY 6 HOURS
Status: DISCONTINUED | OUTPATIENT
Start: 2022-03-25 | End: 2022-03-28

## 2022-03-25 RX ORDER — DEXAMETHASONE SODIUM PHOSPHATE 10 MG/ML
INJECTION, SOLUTION INTRAMUSCULAR; INTRAVENOUS AS NEEDED
Status: DISCONTINUED | OUTPATIENT
Start: 2022-03-25 | End: 2022-03-25 | Stop reason: SURG

## 2022-03-25 RX ORDER — SODIUM CHLORIDE, SODIUM LACTATE, POTASSIUM CHLORIDE, CALCIUM CHLORIDE 600; 310; 30; 20 MG/100ML; MG/100ML; MG/100ML; MG/100ML
INJECTION, SOLUTION INTRAVENOUS CONTINUOUS
Status: DISCONTINUED | OUTPATIENT
Start: 2022-03-25 | End: 2022-03-27

## 2022-03-25 RX ORDER — HYDROCODONE BITARTRATE AND ACETAMINOPHEN 5; 325 MG/1; MG/1
1 TABLET ORAL AS NEEDED
Status: DISCONTINUED | OUTPATIENT
Start: 2022-03-25 | End: 2022-03-25 | Stop reason: HOSPADM

## 2022-03-25 RX ORDER — SODIUM CHLORIDE, SODIUM LACTATE, POTASSIUM CHLORIDE, CALCIUM CHLORIDE 600; 310; 30; 20 MG/100ML; MG/100ML; MG/100ML; MG/100ML
INJECTION, SOLUTION INTRAVENOUS CONTINUOUS
Status: DISCONTINUED | OUTPATIENT
Start: 2022-03-25 | End: 2022-03-25 | Stop reason: HOSPADM

## 2022-03-25 RX ORDER — PROCHLORPERAZINE EDISYLATE 5 MG/ML
10 INJECTION INTRAMUSCULAR; INTRAVENOUS EVERY 6 HOURS PRN
Status: ACTIVE | OUTPATIENT
Start: 2022-03-25 | End: 2022-03-27

## 2022-03-25 RX ORDER — MORPHINE SULFATE 10 MG/ML
6 INJECTION, SOLUTION INTRAMUSCULAR; INTRAVENOUS EVERY 10 MIN PRN
Status: DISCONTINUED | OUTPATIENT
Start: 2022-03-25 | End: 2022-03-25 | Stop reason: HOSPADM

## 2022-03-25 RX ORDER — MORPHINE SULFATE 4 MG/ML
2 INJECTION, SOLUTION INTRAMUSCULAR; INTRAVENOUS EVERY 10 MIN PRN
Status: DISCONTINUED | OUTPATIENT
Start: 2022-03-25 | End: 2022-03-25 | Stop reason: HOSPADM

## 2022-03-25 RX ORDER — SENNOSIDES 8.6 MG
17.2 TABLET ORAL NIGHTLY
Status: DISCONTINUED | OUTPATIENT
Start: 2022-03-25 | End: 2022-03-28

## 2022-03-25 RX ORDER — ASPIRIN 81 MG/1
81 TABLET ORAL 2 TIMES DAILY WITH MEALS
Qty: 60 TABLET | Refills: 0 | Status: SHIPPED | OUTPATIENT
Start: 2022-03-25 | End: 2022-04-05

## 2022-03-25 RX ORDER — BISACODYL 10 MG
10 SUPPOSITORY, RECTAL RECTAL
Status: DISCONTINUED | OUTPATIENT
Start: 2022-03-25 | End: 2022-03-28

## 2022-03-25 RX ORDER — SODIUM PHOSPHATE, DIBASIC AND SODIUM PHOSPHATE, MONOBASIC 7; 19 G/133ML; G/133ML
1 ENEMA RECTAL ONCE AS NEEDED
Status: DISCONTINUED | OUTPATIENT
Start: 2022-03-25 | End: 2022-03-28

## 2022-03-25 RX ORDER — TRANEXAMIC ACID 10 MG/ML
INJECTION, SOLUTION INTRAVENOUS AS NEEDED
Status: DISCONTINUED | OUTPATIENT
Start: 2022-03-25 | End: 2022-03-25 | Stop reason: SURG

## 2022-03-25 RX ORDER — DIPHENHYDRAMINE HCL 25 MG
25 CAPSULE ORAL EVERY 4 HOURS PRN
Status: DISCONTINUED | OUTPATIENT
Start: 2022-03-20 | End: 2022-03-28

## 2022-03-25 RX ORDER — CELECOXIB 200 MG/1
400 CAPSULE ORAL ONCE
Status: COMPLETED | OUTPATIENT
Start: 2022-03-25 | End: 2022-03-25

## 2022-03-25 RX ORDER — MORPHINE SULFATE 4 MG/ML
6 INJECTION, SOLUTION INTRAMUSCULAR; INTRAVENOUS EVERY 2 HOUR PRN
Status: DISPENSED | OUTPATIENT
Start: 2022-03-25 | End: 2022-03-26

## 2022-03-25 RX ORDER — ONDANSETRON 2 MG/ML
4 INJECTION INTRAMUSCULAR; INTRAVENOUS EVERY 4 HOURS PRN
Status: DISPENSED | OUTPATIENT
Start: 2022-03-25 | End: 2022-03-27

## 2022-03-25 RX ORDER — HYDROMORPHONE HYDROCHLORIDE 1 MG/ML
0.6 INJECTION, SOLUTION INTRAMUSCULAR; INTRAVENOUS; SUBCUTANEOUS EVERY 5 MIN PRN
Status: DISCONTINUED | OUTPATIENT
Start: 2022-03-25 | End: 2022-03-25 | Stop reason: HOSPADM

## 2022-03-25 RX ORDER — POLYETHYLENE GLYCOL 3350 17 G/17G
17 POWDER, FOR SOLUTION ORAL DAILY PRN
Status: DISCONTINUED | OUTPATIENT
Start: 2022-03-25 | End: 2022-03-28

## 2022-03-25 RX ORDER — HALOPERIDOL 5 MG/ML
0.25 INJECTION INTRAMUSCULAR ONCE AS NEEDED
Status: DISCONTINUED | OUTPATIENT
Start: 2022-03-25 | End: 2022-03-25 | Stop reason: HOSPADM

## 2022-03-25 RX ORDER — HYDROMORPHONE HYDROCHLORIDE 1 MG/ML
0.2 INJECTION, SOLUTION INTRAMUSCULAR; INTRAVENOUS; SUBCUTANEOUS EVERY 5 MIN PRN
Status: DISCONTINUED | OUTPATIENT
Start: 2022-03-25 | End: 2022-03-25 | Stop reason: HOSPADM

## 2022-03-25 RX ORDER — ZOLPIDEM TARTRATE 5 MG/1
5 TABLET ORAL NIGHTLY PRN
Status: DISCONTINUED | OUTPATIENT
Start: 2022-03-25 | End: 2022-03-28

## 2022-03-25 RX ORDER — ASPIRIN 81 MG/1
81 TABLET ORAL 2 TIMES DAILY
Status: DISCONTINUED | OUTPATIENT
Start: 2022-03-25 | End: 2022-03-28

## 2022-03-25 RX ADMIN — DEXAMETHASONE SODIUM PHOSPHATE 4 MG: 10 INJECTION, SOLUTION INTRAMUSCULAR; INTRAVENOUS at 16:10:00

## 2022-03-25 RX ADMIN — TRANEXAMIC ACID 1000 MG: 10 INJECTION, SOLUTION INTRAVENOUS at 16:28:00

## 2022-03-25 RX ADMIN — TRANEXAMIC ACID 1000 MG: 10 INJECTION, SOLUTION INTRAVENOUS at 18:16:00

## 2022-03-25 RX ADMIN — LIDOCAINE HYDROCHLORIDE 3 MG: 10 INJECTION, SOLUTION EPIDURAL; INFILTRATION; INTRACAUDAL; PERINEURAL at 16:19:00

## 2022-03-25 RX ADMIN — ROPIVACAINE HYDROCHLORIDE 30 ML: 5 INJECTION, SOLUTION EPIDURAL; INFILTRATION; PERINEURAL at 16:10:00

## 2022-03-25 RX ADMIN — CEFAZOLIN SODIUM/WATER 2 G: 2 G/20 ML SYRINGE (ML) INTRAVENOUS at 16:26:00

## 2022-03-25 RX ADMIN — BUPIVACAINE HYDROCHLORIDE 1.8 ML: 7.5 INJECTION, SOLUTION INTRASPINAL at 16:21:00

## 2022-03-25 RX ADMIN — MIDAZOLAM HYDROCHLORIDE 2 MG: 1 INJECTION INTRAMUSCULAR; INTRAVENOUS at 16:05:00

## 2022-03-25 NOTE — ANESTHESIA PROCEDURE NOTES
Peripheral Block  Performed by: Estevan Taylor MD  Authorized by: Estevan Taylor MD       General Information and Staff    Start Time:  3/25/2022 4:05 PM  End Time:  3/25/2022 4:10 PM  Anesthesiologist:  Estevan Taylor MD  Performed by:   Anesthesiologist  Patient Location:  PACU    Block Placement: Pre Induction  Site Identification: real time ultrasound guided and image stored and retrievable    Block site/laterality marked before start: site marked  Reason for Block: at surgeon's request and post-op pain management    Preanesthetic Checklist: 2 patient identifers, IV checked, risks and benefits discussed, monitors and equipment checked, pre-op evaluation, timeout performed, anesthesia consent and sterile technique used      Procedure Details    Patient Position:  Supine  Prep: ChloraPrep    Monitoring:  Cardiac monitor  Block Type:  Saphenous  Laterality:  Right  Injection Technique:  Single-shot    Needle    Needle Type:  Short-bevel  Needle Gauge:  22 G  Needle Length:  90 mm  Needle Localization:  Ultrasound guidance  Reason for Ultrasound Use: appropriate spread of the medication was noted in real time and no ultrasound evidence of intravascular and/or intraneural injection            Assessment    Injection Assessment:  Good spread noted, incremental injection, low pressure, local visualized surrounding nerve on ultrasound, negative aspiration for heme and no pain on injection  Paresthesia Pain:  None  Heart Rate Change: No        Medications      Additional Comments

## 2022-03-25 NOTE — OPERATIVE REPORT
[facility] at [address]: 3/25/2022    PREOPERATIVE DIAGNOSIS:   1. Right knee osteoarthritis    POST-OPERATIVE DIAGNOSIS:   1. Right knee osteoarthritis    PROCEDURE:  1. Right primary total knee arthroplasty with patellar resurfacing    Location: [facility]    SURGEON: Wilder Mondragon MD  Assistant(s): Tk Hnenessy MD, Ren BARON    Anesthesia type: Spinal  Estimated Blood Loss: 20cc    Drains: None    Complications: None immediately apparent    Implants:  Farzana Triathlon PS size 6 femoral component  Volcano Triathlon Universal Tibial Baseplate size 6  Poly: 9mm posterior stabilized insert    38mm Cemented Patellar button    Indication: This is a 57year old man, who presented with chronic knee pain refractory to non-operative treatment (including pain relieving medication, corticosteroid injections and physical therapy), and impairing activities of daily living. Radiographic evaluation demonstrated knee osteoarthritis with valgus deformity. Surgical versus non-surgical options were discussed with the patient, and together we decided it is best to proceed with a total knee arthroplasty with the goals of pain relief and improvement in function. All risks and benefits of surgery including bleeding, infection, instability, dnaita-prosthetic fracture, extensor mechanism injury, neurovascular injury, as well as medical and anesthesia-related complications were discussed with the patient / family, who elected to proceed with surgery. Procedure in detail:    Following correct identification of the patient and the correct extremity, the patient was taken to the operating room and positioned supine on a regular table. Danita-operative antibiotics and the above anesthesia were administered prior to incision per the standard protocol.  A non-sterile padded tourniquet was placed on the thigh during the procedure and the extremity was prepped and draped in the usual sterile fashion. The extremity was exsanguinated using Esmarch bandage and the tourniquet was raised to 300mmHg. A midline incision was made medial to the tibial tubercle centered over patella taken down to the joint capsule of the knee. Minimal subfascial flaps were created. A medial parapatellar arthrotomy was performed. The deep MCL was subperiosteally elevated at the medial proximal tibial joint line for exposure. The fat pad was released off of the tibial plateau laterally. There was significant synovitis around the knee. The patella was everted and the knee was flexed. The remnants of the anterior horn of the medial and lateral meniscus were removed as well as the ACL and PCL from the intercondylar notch. All distal protruding osteophytes were removed. We then identified the appropriate starting point on the distal femur based on preoperative templating and accessed the intramedullary canal of the femur using a drill. The canal was lavaged, suctioned and the intramedullary  distal femur cutting guide was inserted. The distal femur was resected in the appropriate amount valgus according to the preoperative template. The resection depth and level was checked to be appropriate and our attention was then turned to the tibia. The tibia was subluxed anteriorly and exposed with retractors. The lateral meniscus was excised. An external medullary cutting guide was attached using proximal tibial pins, and the proximal tibial surface was resected in an alignment perpendicular to the mechanical axis, and to a depth accommodating the smallest insert/tibial baseplate thickness. The knee was then brought into full extension and our extension gap was assessed.  Appropriate releases were performed in extension on the side of the concavity of the deformity to form a rectangular flexion space and spacer block was placed securely into a rectangular extension space giving good collateral stability and recreation of our mechanical axis. The knee was then flexed to 90 degrees. The femur was appropriately sized based on posterior referencing. A tensioner device was inserted into the flexion space and tensioned at 90 degrees, establishing appropriate femoral external rotation, and the posterior referencing holes were drilled. The vidya-posterior 4-in-1 cutting block was then pinned in the appropriate amount of external rotation based on a symmetric flexion gap. We then performed the anterior/posterior and chamfer cuts for the femoral component. A spacer block with a drop michael was used to verify balance and mechanical alignment. A laminar  was then placed laterally first then medially, allowing exposure and removal of the remnants of the posterior horns of the medial and lateral menisci as well as any posterior osteophyte. Pain cocktail injection was delivered into the posterior capsule. The tibia was then subluxed anteriorly and sized. The tibial template guide was pinned, reamed and the keel was punched in the appropriate amount of external rotation. A trial tibial baseplate was inserted. The femur was then exposed again, and the box cutting guide was pinned in the appropriate medial-lateral position. A reciprocating saw was used to complete the box resection. A trial femur was placed, the knee was reduced with a trial insert and was taken through a range of motion and found to be stable in the varus/valgus plane 0-30 degrees of flexion and the AP plane at 90 degrees of flexion. Our attention was then turned to the patella. A symmetric resection was performed using an oscillating saw to recreate native patella height. All extraneous osteophyte and synovium was removed. A trial button was placed and the patella tracked centrally using the no thumbs technique. All trial components were removed. The final components were opened on the back table.  The femur and the tibia were copiously lavaged and dried to allow maximum cement interdigitation. A periarticular pain cocktail injection was delivered to the soft tissues. Two packs of high viscosity cement (Refobacin) were vacuum mixed and the components were cemented (tibia, femur then patella. The knee was reduced with a trial insert in place and the cement was allowed to cure. Any excess cement was removed to minimize third body wear. Once the cement cured, the tourniquet was released meticulous hemostasis was obtained. Any extraneous cement was removed from around the knee taking particular care to remove extraneous cement from the posterior aspect of the knee. The final insert was then placed onto the tibial tray, impacted and locked. Stability testing was consistent with intraoperative trialing and the patella tracked centrally. The knee was then copiously lavaged with dilute betadine and saline irrigation. The deep capsule was closed with #1 Vicryl interrupted suture and a #2 quill barbed suture. The subcutaneous tissue was closed with 2-0 Vicryl absorbable suture and the skin was closed with 3-0 monocryl and dermabond. A sterile occlusive dressing was applied. The patient tolerated the procedure well and taken to the recovery unit without immediate complications. Post-operative Plan:    1. The patient will receive post-operative antibiotics for 24 hours as surgical prophylaxis. 2. VTE prophylaxis will consist of early mobilization, mechanical compressive devices, and Aspirin 81 BID if not medically contra-indicated. 3. The patient will be weight-bearing as tolerated and allowed to mobilize with physical therapy. 4. The patient will be permitted range of motion as tolerated. 5. The patient will follow up in clinic in 2 weeks for a wound check, and radiographic evaluation.

## 2022-03-25 NOTE — ANESTHESIA POSTPROCEDURE EVALUATION
Patient: Andrei Short    Procedure Summary     Date: 03/25/22 Room / Location: [address] [place] [address] MAIN OR 11 / [address] [place] [address] MAIN OR    Anesthesia Start: 1428 Anesthesia Stop:     Procedure: right total knee arthroplasty (Right ) Diagnosis: (right knee osteoarthritis)    Surgeons: Tawanna Oates MD Anesthesiologist: Phil Mckeon MD    Anesthesia Type: spinal ASA Status: 2          Anesthesia Type: spinal    Vitals Value Taken Time   /84 03/25/22 1850   Temp 97.6 03/25/22 1850   Pulse 87 03/25/22 1849   Resp 12 03/25/22 1850   SpO2 97 % 03/25/22 1849   Vitals shown include unvalidated device data.     [address] [place] [address] AN Post Evaluation:   Patient Evaluated in PACU  Patient Participation: complete - patient participated  Level of Consciousness: awake  Pain Management: adequate  Airway Patency:patent  Dental exam unchanged from preop  Yes    Cardiovascular Status: acceptable  Respiratory Status: acceptable  Postoperative Hydration acceptable      Adeel Turner MD  3/25/2022 6:50 PM

## 2022-03-25 NOTE — ANESTHESIA PROCEDURE NOTES
Spinal Block  Performed by: Tabitha Garcia MD  Authorized by: Tabitha Garcia MD       General Information and Staff    Start Time:  3/25/2022 4:15 PM  End Time:  3/25/2022 4:20 PM  Anesthesiologist:  Tabitha Garcia MD  Performed by:   Anesthesiologist  Site identification: surface landmarks    Reason for Block: at surgeon's request and surgical anesthesia    Preanesthetic Checklist: patient identified, IV checked, risks and benefits discussed, monitors and equipment checked, pre-op evaluation, timeout performed, anesthesia consent and sterile technique used      Procedure Details    Patient Position:  Sitting  Prep: ChloraPrep    Monitoring:  Cardiac monitor  Approach:  Midline  Location:  L3-4  Injection Technique:  Single-shot    Needle    Needle Type:  Pencil-tip  Needle Gauge:  24 G  Needle Length:  3.5 in    Assessment    Sensory Level:  T4  Events: clear CSF, CSF aspirated, well tolerated and blood negative      Additional Comments

## 2022-03-26 LAB
ANION GAP SERPL CALC-SCNC: 4 MMOL/L (ref 0–18)
BUN BLD-MCNC: 17 MG/DL (ref 7–18)
BUN/CREAT SERPL: 22.1 (ref 10–20)
CALCIUM BLD-MCNC: 8.4 MG/DL (ref 8.5–10.1)
CHLORIDE SERPL-SCNC: 103 MMOL/L (ref 98–112)
CO2 SERPL-SCNC: 27 MMOL/L (ref 21–32)
CREAT BLD-MCNC: 0.77 MG/DL
DEPRECATED RDW RBC AUTO: 51.4 FL (ref 35.1–46.3)
ERYTHROCYTE [DISTWIDTH] IN BLOOD BY AUTOMATED COUNT: 15.8 % (ref 11–15)
GLUCOSE BLD-MCNC: 108 MG/DL (ref 70–99)
HCT VFR BLD AUTO: 34.2 %
HGB BLD-MCNC: 10.7 G/DL
IRON SATN MFR SERPL: 9 %
IRON SERPL-MCNC: 22 UG/DL
MCH RBC QN AUTO: 27.7 PG (ref 26–34)
MCHC RBC AUTO-ENTMCNC: 31.3 G/DL (ref 31–37)
MCV RBC AUTO: 88.6 FL
OSMOLALITY SERPL CALC.SUM OF ELEC: 280 MOSM/KG (ref 275–295)
PLATELET # BLD AUTO: 507 10(3)UL (ref 150–450)
POTASSIUM SERPL-SCNC: 4.2 MMOL/L (ref 3.5–5.1)
RBC # BLD AUTO: 3.86 X10(6)UL
SODIUM SERPL-SCNC: 134 MMOL/L (ref 136–145)
TIBC SERPL-MCNC: 249 UG/DL (ref 240–450)
TRANSFERRIN SERPL-MCNC: 167 MG/DL (ref 200–360)
URATE SERPL-MCNC: 4.8 MG/DL
WBC # BLD AUTO: 14.7 X10(3) UL (ref 4–11)

## 2022-03-26 PROCEDURE — 84466 ASSAY OF TRANSFERRIN: CPT | Performed by: HOSPITALIST

## 2022-03-26 PROCEDURE — 97535 SELF CARE MNGMENT TRAINING: CPT

## 2022-03-26 PROCEDURE — 80048 BASIC METABOLIC PNL TOTAL CA: CPT | Performed by: FAMILY MEDICINE

## 2022-03-26 PROCEDURE — 83540 ASSAY OF IRON: CPT | Performed by: HOSPITALIST

## 2022-03-26 PROCEDURE — 93010 ELECTROCARDIOGRAM REPORT: CPT | Performed by: HOSPITALIST

## 2022-03-26 PROCEDURE — 84550 ASSAY OF BLOOD/URIC ACID: CPT | Performed by: HOSPITALIST

## 2022-03-26 PROCEDURE — 85027 COMPLETE CBC AUTOMATED: CPT | Performed by: FAMILY MEDICINE

## 2022-03-26 PROCEDURE — 93005 ELECTROCARDIOGRAM TRACING: CPT

## 2022-03-26 PROCEDURE — 97165 OT EVAL LOW COMPLEX 30 MIN: CPT

## 2022-03-26 RX ORDER — CYCLOBENZAPRINE HCL 10 MG
10 TABLET ORAL 3 TIMES DAILY PRN
Status: DISCONTINUED | OUTPATIENT
Start: 2022-03-26 | End: 2022-03-28

## 2022-03-26 RX ORDER — KETOROLAC TROMETHAMINE 30 MG/ML
30 INJECTION, SOLUTION INTRAMUSCULAR; INTRAVENOUS EVERY 6 HOURS PRN
Status: DISCONTINUED | OUTPATIENT
Start: 2022-03-26 | End: 2022-03-26

## 2022-03-26 RX ORDER — KETOROLAC TROMETHAMINE 30 MG/ML
30 INJECTION, SOLUTION INTRAMUSCULAR; INTRAVENOUS EVERY 6 HOURS
Status: DISPENSED | OUTPATIENT
Start: 2022-03-26 | End: 2022-03-27

## 2022-03-26 RX ORDER — METOPROLOL TARTRATE 5 MG/5ML
5 INJECTION INTRAVENOUS EVERY 6 HOURS PRN
Status: DISCONTINUED | OUTPATIENT
Start: 2022-03-26 | End: 2022-03-28

## 2022-03-26 RX ORDER — LISINOPRIL 5 MG/1
5 TABLET ORAL DAILY
Status: DISCONTINUED | OUTPATIENT
Start: 2022-03-26 | End: 2022-03-27

## 2022-03-26 RX ORDER — HYDRALAZINE HYDROCHLORIDE 20 MG/ML
10 INJECTION INTRAMUSCULAR; INTRAVENOUS EVERY 6 HOURS PRN
Status: DISCONTINUED | OUTPATIENT
Start: 2022-03-26 | End: 2022-03-28

## 2022-03-26 RX ORDER — CHLORTHALIDONE 25 MG/1
25 TABLET ORAL DAILY
Status: DISCONTINUED | OUTPATIENT
Start: 2022-03-26 | End: 2022-03-28

## 2022-03-26 NOTE — PLAN OF CARE
POD#1 right total knee, ace wrap is clean/dry and intact. About 2 weeks ago pt feel and substained a right shoulder fx, delay healing. Pt is A&Ox4. He is up with assist x1, walker. BP has been an issue. PRN hydralazine and he was started on Chlorthalidone 25mg Daily. Blood pressure continues to be high, will continue to monitor. Pain also has been an issue. He has scheduled Tramadol and Tylenol and PRN MSIR and IV morphine. Pt states his pain 9/10. Talked to Dr. Tonya Joshua and he ordered Toradol 30mg Q6 for 24 hrs and discontinued the Celebrex. Plan is Home with [facility] tomorrow. Problem: Patient Centered Care  Goal: Patient preferences are identified and integrated in the patient's plan of care  Description: Interventions:  - What would you like us to know as we care for you?  I had my other knee done in December; I fell out of bed two weeks ago and dislocated my shoulder  - Provide timely, complete, and accurate information to patient/family  - Incorporate patient and family knowledge, values, beliefs, and cultural backgrounds into the planning and delivery of care  - Encourage patient/family to participate in care and decision-making at the level they choose  - Honor patient and family perspectives and choices  Outcome: Progressing     Problem: METABOLIC/FLUID AND ELECTROLYTES - ADULT  Goal: Electrolytes maintained within normal limits  Description: INTERVENTIONS:  - Monitor labs and rhythm and assess patient for signs and symptoms of electrolyte imbalances  - Administer electrolyte replacement as ordered  - Monitor response to electrolyte replacements, including rhythm and repeat lab results as appropriate  - Fluid restriction as ordered  - Instruct patient on fluid and nutrition restrictions as appropriate  Outcome: Progressing     Problem: HEMATOLOGIC - ADULT  Goal: Maintains hematologic stability  Description: INTERVENTIONS  - Assess for signs and symptoms of bleeding or hemorrhage  - Monitor labs and vital signs for trends  - Administer supportive blood products/factors, fluids and medications as ordered and appropriate  - Administer supportive blood products/factors as ordered and appropriate  Outcome: Progressing     Problem: MUSCULOSKELETAL - ADULT  Goal: Return mobility to safest level of function  Description: INTERVENTIONS:  - Assess patient stability and activity tolerance for standing, transferring and ambulating w/ or w/o assistive devices  - Assist with transfers and ambulation using safe patient handling equipment as needed  - Ensure adequate protection for wounds/incisions during mobilization  - Obtain PT/OT consults as needed  - Advance activity as appropriate  - Communicate ordered activity level and limitations with patient/family  Outcome: Progressing  Goal: Maintain proper alignment of affected body part  Description: INTERVENTIONS:  - Support and protect limb and body alignment per provider's orders  - Instruct and reinforce with patient and family use of appropriate assistive device and precautions (e.g. spinal or hip dislocation precautions)  Outcome: Progressing

## 2022-03-26 NOTE — PROGRESS NOTES
Attempted eval, Pt was pre medicated by RN, just finished working w/ OT, will re-attempt asap.    Mary Heredia, PT

## 2022-03-26 NOTE — PLAN OF CARE
VSS, no acute events overnight. Pt is aox4, ambulating WBAT, not up overnight. Voiding per urinal small amounts at a time. SCD's, abean hose, and aspirin for DVT prophylaxis. Dressing ACE bandage. Oxy-IR/MSIR PO for pain, MSIV for breakthrough. Pt plans to d/c home with [facility] as with previous knee surgery. Disease process discussed with pt and wife at bedside. Bed in lowest position, call light and personal possessions within reach. Pt instructed to call for assistance before getting up. Pt unable to urinate by 0500; bladder scanned for >999mL. Pt requesting more time to try to urinate before straight cath. This RN explained risks of waiting too long such as bladder rupture. Will endorse to [facility] . Problem: Patient Centered Care  Goal: Patient preferences are identified and integrated in the patient's plan of care  Description: Interventions:  - What would you like us to know as we care for you?  I had my other knee done in December; I fell out of bed two weeks ago and dislocated my shoulder  - Provide timely, complete, and accurate information to patient/family  - Incorporate patient and family knowledge, values, beliefs, and cultural backgrounds into the planning and delivery of care  - Encourage patient/family to participate in care and decision-making at the level they choose  - Honor patient and family perspectives and choices  Outcome: Progressing     Problem: METABOLIC/FLUID AND ELECTROLYTES - ADULT  Goal: Electrolytes maintained within normal limits  Description: INTERVENTIONS:  - Monitor labs and rhythm and assess patient for signs and symptoms of electrolyte imbalances  - Administer electrolyte replacement as ordered  - Monitor response to electrolyte replacements, including rhythm and repeat lab results as appropriate  - Fluid restriction as ordered  - Instruct patient on fluid and nutrition restrictions as appropriate  Outcome: Progressing     Problem: HEMATOLOGIC - ADULT  Goal: Maintains hematologic stability  Description: INTERVENTIONS  - Assess for signs and symptoms of bleeding or hemorrhage  - Monitor labs and vital signs for trends  - Administer supportive blood products/factors, fluids and medications as ordered and appropriate  - Administer supportive blood products/factors as ordered and appropriate  Outcome: Progressing     Problem: MUSCULOSKELETAL - ADULT  Goal: Return mobility to safest level of function  Description: INTERVENTIONS:  - Assess patient stability and activity tolerance for standing, transferring and ambulating w/ or w/o assistive devices  - Assist with transfers and ambulation using safe patient handling equipment as needed  - Ensure adequate protection for wounds/incisions during mobilization  - Obtain PT/OT consults as needed  - Advance activity as appropriate  - Communicate ordered activity level and limitations with patient/family  Outcome: Progressing  Goal: Maintain proper alignment of affected body part  Description: INTERVENTIONS:  - Support and protect limb and body alignment per provider's orders  - Instruct and reinforce with patient and family use of appropriate assistive device and precautions (e.g. spinal or hip dislocation precautions)  Outcome: Progressing

## 2022-03-26 NOTE — ANESTHESIA POST-OP FOLLOW-UP NOTE
[facility] is POD#1 after   Surgical Procedures     Case IDs Date Procedure Surgeon Location Status    [id number] 3/25/22 right total knee arthroplasty Christine Recinos MD [facility] OR [facility]      . Phoebe Savage underwent adductor canal nerve block for analgesia. Tolerated the procedure well. Today, denies residual LE weakness/numbness. Injection site examined, no erythema, hematoma, or tenderness to palpation. Pain score is 5/10. No further anesthesia management needed at this time. Doing well on oral pain meds. All anesthetic questions answered.      Quinn Corona MD

## 2022-03-26 NOTE — PROGRESS NOTES
Attempted eval, Pt in supine, PCT checking vitals w/ BP in 200s upon arrival, Pt mildly flushed, per spouse present at bedside, plan is discharge home today. Deferred activity/ PT eval at this time due to concern regarding elevated BP for safety, will re-attempt asap, communicated w/ RN.    Sammy Bethel, PT

## 2022-03-26 NOTE — PROGRESS NOTES
[facility] at [address] NOTE    Date: 3/26/2022    Patient: Saida Savage    Subjective:  Nothing acute overnight. Pain in the operative extremity is well controlled. Patient denies new onset numbness/tingling in the operative extremity. Patient also denies chest pain / shortness of breath, nausea/vomiting. Objective:   03/26/22  1100   BP: (!) 173/84   Pulse:    Resp:    Temp:      I/O last 3 completed shifts: In: 20 [IV PIGGYBACK:20]  Out: 881 [Urine:475]     Gen: awake, alert, not in acute distress  Abdomen nondistended, non-tender    Right Lower Extremity: dressing clean, dry, intact. Sensation intact to light touch in Sural/Saphenous/Tibial/Superficial Peroneal/Deep Peroneal and Tibial distributions. Motor exam : 5/5 EHL/FHL/TA/GSC. Vascular exam: Palpable dorsalis pedis pulses. Cap refill ~2s in the toes. Foot warm and well perfused    Labs:  Lab Results   Component Value Date    HGB 10.7 (L) 03/26/2022    HGB 12.4 (L) 03/16/2022    HGB 11.2 (L) 12/18/2021     Lab Results   Component Value Date    INR 1.13 12/01/2021         ASSESSMENT/PLAN: 57year old yo male s/p right total knee arthroplasty on 3/25/2022   - Weight bearing status: WBAT RLE  - Range of motion: As tolerated  - Mechanical DVT prophylaxis and chemoprophylaxis with ASA 81 BID  - 24 hours of perioperative antibiotics  - PT for mobilization and motion  - Advance diet as tolerated. Discontinue IV fluids POD1 if tolerating diet. - Multimodal pain control regimen ordered  - Disposition: Pending PT Evaluation.  Home Today    DC meds sent from outside EMR  DC instructions written in DC instructions section    Karthikeyan Montoya MD  [facility] at [facility]

## 2022-03-27 LAB
ANION GAP SERPL CALC-SCNC: 7 MMOL/L (ref 0–18)
BILIRUB UR QL: NEGATIVE
BUN BLD-MCNC: 13 MG/DL (ref 7–18)
BUN/CREAT SERPL: 19.4 (ref 10–20)
CALCIUM BLD-MCNC: 9.1 MG/DL (ref 8.5–10.1)
CHLORIDE SERPL-SCNC: 98 MMOL/L (ref 98–112)
CLARITY UR: CLEAR
CO2 SERPL-SCNC: 28 MMOL/L (ref 21–32)
COLOR UR: YELLOW
CREAT BLD-MCNC: 0.67 MG/DL
DEPRECATED RDW RBC AUTO: 51.8 FL (ref 35.1–46.3)
ERYTHROCYTE [DISTWIDTH] IN BLOOD BY AUTOMATED COUNT: 16 % (ref 11–15)
GLUCOSE BLD-MCNC: 125 MG/DL (ref 70–99)
GLUCOSE UR-MCNC: NEGATIVE MG/DL
HCT VFR BLD AUTO: 34.7 %
HGB BLD-MCNC: 10.9 G/DL
KETONES UR-MCNC: NEGATIVE MG/DL
LEUKOCYTE ESTERASE UR QL STRIP.AUTO: NEGATIVE
MCH RBC QN AUTO: 27.6 PG (ref 26–34)
MCHC RBC AUTO-ENTMCNC: 31.4 G/DL (ref 31–37)
MCV RBC AUTO: 87.8 FL
NITRITE UR QL STRIP.AUTO: NEGATIVE
OSMOLALITY SERPL CALC.SUM OF ELEC: 278 MOSM/KG (ref 275–295)
PH UR: 5 [PH] (ref 5–8)
PLATELET # BLD AUTO: 506 10(3)UL (ref 150–450)
POTASSIUM SERPL-SCNC: 3.5 MMOL/L (ref 3.5–5.1)
PROT UR-MCNC: NEGATIVE MG/DL
RBC # BLD AUTO: 3.95 X10(6)UL
SODIUM SERPL-SCNC: 133 MMOL/L (ref 136–145)
SP GR UR STRIP: 1.01 (ref 1–1.03)
TROPONIN I HIGH SENSITIVITY: 14 NG/L
UROBILINOGEN UR STRIP-ACNC: <2
VIT C UR-MCNC: NEGATIVE MG/DL
WBC # BLD AUTO: 10.1 X10(3) UL (ref 4–11)

## 2022-03-27 PROCEDURE — 85027 COMPLETE CBC AUTOMATED: CPT | Performed by: HOSPITALIST

## 2022-03-27 PROCEDURE — 97116 GAIT TRAINING THERAPY: CPT

## 2022-03-27 PROCEDURE — 97162 PT EVAL MOD COMPLEX 30 MIN: CPT

## 2022-03-27 PROCEDURE — 80048 BASIC METABOLIC PNL TOTAL CA: CPT | Performed by: HOSPITALIST

## 2022-03-27 PROCEDURE — 84484 ASSAY OF TROPONIN QUANT: CPT | Performed by: HOSPITALIST

## 2022-03-27 PROCEDURE — 81001 URINALYSIS AUTO W/SCOPE: CPT | Performed by: HOSPITALIST

## 2022-03-27 PROCEDURE — 97530 THERAPEUTIC ACTIVITIES: CPT

## 2022-03-27 RX ORDER — MAGNESIUM HYDROXIDE/ALUMINUM HYDROXICE/SIMETHICONE 120; 1200; 1200 MG/30ML; MG/30ML; MG/30ML
30 SUSPENSION ORAL EVERY 6 HOURS PRN
Status: DISCONTINUED | OUTPATIENT
Start: 2022-03-27 | End: 2022-03-28

## 2022-03-27 RX ORDER — CYCLOBENZAPRINE HCL 10 MG
10 TABLET ORAL 3 TIMES DAILY PRN
Qty: 30 TABLET | Refills: 0 | Status: SHIPPED | OUTPATIENT
Start: 2022-03-27 | End: 2022-03-28

## 2022-03-27 RX ORDER — TAMSULOSIN HYDROCHLORIDE 0.4 MG/1
0.4 CAPSULE ORAL DAILY
Status: DISCONTINUED | OUTPATIENT
Start: 2022-03-27 | End: 2022-03-28

## 2022-03-27 RX ORDER — LISINOPRIL 10 MG/1
10 TABLET ORAL DAILY
Qty: 30 TABLET | Refills: 0 | Status: SHIPPED | OUTPATIENT
Start: 2022-03-28 | End: 2022-04-05

## 2022-03-27 RX ORDER — MORPHINE SULFATE 15 MG/1
15 TABLET ORAL EVERY 4 HOURS PRN
Status: DISCONTINUED | OUTPATIENT
Start: 2022-03-27 | End: 2022-03-28

## 2022-03-27 RX ORDER — CHLORTHALIDONE 25 MG/1
25 TABLET ORAL DAILY
Qty: 30 TABLET | Refills: 0 | Status: SHIPPED | OUTPATIENT
Start: 2022-03-28 | End: 2022-04-05

## 2022-03-27 RX ORDER — TAMSULOSIN HYDROCHLORIDE 0.4 MG/1
0.4 CAPSULE ORAL DAILY
Qty: 30 CAPSULE | Refills: 0 | Status: SHIPPED | OUTPATIENT
Start: 2022-03-28 | End: 2022-04-27

## 2022-03-27 RX ORDER — LISINOPRIL 10 MG/1
10 TABLET ORAL DAILY
Status: DISCONTINUED | OUTPATIENT
Start: 2022-03-27 | End: 2022-03-28

## 2022-03-27 RX ORDER — POLYETHYLENE GLYCOL 3350 17 G/17G
17 POWDER, FOR SOLUTION ORAL DAILY
Status: DISCONTINUED | OUTPATIENT
Start: 2022-03-27 | End: 2022-03-28

## 2022-03-27 NOTE — PLAN OF CARE
POD #2 of Right total knee. Right knee he has the ace wrap, clean/dry and intact. He had a fall 3 weeks ago and sustained a right rotar cuff tear. Tele is on. He is voiding in small amounts. Post void have been done. He is refusing straight cath but when he agreed had a hard time inserting. Dr. Rhiannon Carvalho is aware. Pt states his pain is 2/10. He denied his scheduled Tramadol. He is up with assist x1, walker. Ice pack are on knee. BP have been between 140-150s. BP was in the 180s after trying to insert catheter twice. Will recheck. Plan is home with [facility].    Problem: Patient Centered Care  Goal: Patient preferences are identified and integrated in the patient's plan of care  Description: Interventions:  - What would you like us to know as we care for you?  I had my other knee done in December; I fell out of bed two weeks ago and dislocated my shoulder  - Provide timely, complete, and accurate information to patient/family  - Incorporate patient and family knowledge, values, beliefs, and cultural backgrounds into the planning and delivery of care  - Encourage patient/family to participate in care and decision-making at the level they choose  - Honor patient and family perspectives and choices  Outcome: Progressing     Problem: METABOLIC/FLUID AND ELECTROLYTES - ADULT  Goal: Electrolytes maintained within normal limits  Description: INTERVENTIONS:  - Monitor labs and rhythm and assess patient for signs and symptoms of electrolyte imbalances  - Administer electrolyte replacement as ordered  - Monitor response to electrolyte replacements, including rhythm and repeat lab results as appropriate  - Fluid restriction as ordered  - Instruct patient on fluid and nutrition restrictions as appropriate  Outcome: Progressing     Problem: HEMATOLOGIC - ADULT  Goal: Maintains hematologic stability  Description: INTERVENTIONS  - Assess for signs and symptoms of bleeding or hemorrhage  - Monitor labs and vital signs for trends  - Administer supportive blood products/factors, fluids and medications as ordered and appropriate  - Administer supportive blood products/factors as ordered and appropriate  Outcome: Progressing     Problem: MUSCULOSKELETAL - ADULT  Goal: Return mobility to safest level of function  Description: INTERVENTIONS:  - Assess patient stability and activity tolerance for standing, transferring and ambulating w/ or w/o assistive devices  - Assist with transfers and ambulation using safe patient handling equipment as needed  - Ensure adequate protection for wounds/incisions during mobilization  - Obtain PT/OT consults as needed  - Advance activity as appropriate  - Communicate ordered activity level and limitations with patient/family  Outcome: Progressing  Goal: Maintain proper alignment of affected body part  Description: INTERVENTIONS:  - Support and protect limb and body alignment per provider's orders  - Instruct and reinforce with patient and family use of appropriate assistive device and precautions (e.g. spinal or hip dislocation precautions)  Outcome: Progressing

## 2022-03-27 NOTE — PROGRESS NOTES
Pt refused to take oxy ir stating it makes him sweat. Requesting MSir instead. Dr [facility].

## 2022-03-27 NOTE — PLAN OF CARE
POD#2. A&O x4. Post op pain & BP better after multiple meds. See MAR. Right knee with ace wrap & gel ice pack. Denies numbness or tingling. Right arm precaution due to hx of dislocation of shoulder & torn tendons. Fall precautions in place. Call light in reach & bed alarm on. Remote tele started. Plan for pt to go home with [facility] when stable. Problem: Patient Centered Care  Goal: Patient preferences are identified and integrated in the patient's plan of care  Description: Interventions:  - What would you like us to know as we care for you?  I had my other knee done in December; I fell out of bed two weeks ago and dislocated my shoulder  - Provide timely, complete, and accurate information to patient/family  - Incorporate patient and family knowledge, values, beliefs, and cultural backgrounds into the planning and delivery of care  - Encourage patient/family to participate in care and decision-making at the level they choose  - Honor patient and family perspectives and choices  Outcome: Progressing     Problem: METABOLIC/FLUID AND ELECTROLYTES - ADULT  Goal: Electrolytes maintained within normal limits  Description: INTERVENTIONS:  - Monitor labs and rhythm and assess patient for signs and symptoms of electrolyte imbalances  - Administer electrolyte replacement as ordered  - Monitor response to electrolyte replacements, including rhythm and repeat lab results as appropriate  - Fluid restriction as ordered  - Instruct patient on fluid and nutrition restrictions as appropriate  Outcome: Progressing     Problem: HEMATOLOGIC - ADULT  Goal: Maintains hematologic stability  Description: INTERVENTIONS  - Assess for signs and symptoms of bleeding or hemorrhage  - Monitor labs and vital signs for trends  - Administer supportive blood products/factors, fluids and medications as ordered and appropriate  - Administer supportive blood products/factors as ordered and appropriate  Outcome: Progressing     Problem: MUSCULOSKELETAL - ADULT  Goal: Return mobility to safest level of function  Description: INTERVENTIONS:  - Assess patient stability and activity tolerance for standing, transferring and ambulating w/ or w/o assistive devices  - Assist with transfers and ambulation using safe patient handling equipment as needed  - Ensure adequate protection for wounds/incisions during mobilization  - Obtain PT/OT consults as needed  - Advance activity as appropriate  - Communicate ordered activity level and limitations with patient/family  Outcome: Progressing  Goal: Maintain proper alignment of affected body part  Description: INTERVENTIONS:  - Support and protect limb and body alignment per provider's orders  - Instruct and reinforce with patient and family use of appropriate assistive device and precautions (e.g. spinal or hip dislocation precautions)  Outcome: Progressing

## 2022-03-27 NOTE — TREATMENT PLAN
Attempt twice to straight cath but kept getting resistance. Got an order to insert stokes with caude and was not able to insert. Dr. Rhiannon Carvalho was aware and urology. TL and other nurse tried as well. Pt last voided 450ml and bladder scan showed 400cc. Waiting for Dr. Rhiannon Carvalho to see what is the next step.

## 2022-03-28 VITALS
SYSTOLIC BLOOD PRESSURE: 139 MMHG | WEIGHT: 242 LBS | HEIGHT: 70 IN | OXYGEN SATURATION: 95 % | TEMPERATURE: 98 F | DIASTOLIC BLOOD PRESSURE: 79 MMHG | HEART RATE: 107 BPM | RESPIRATION RATE: 16 BRPM | BODY MASS INDEX: 34.65 KG/M2

## 2022-03-28 LAB
ANION GAP SERPL CALC-SCNC: 8 MMOL/L (ref 0–18)
BUN BLD-MCNC: 11 MG/DL (ref 7–18)
BUN/CREAT SERPL: 16.7 (ref 10–20)
CALCIUM BLD-MCNC: 8.7 MG/DL (ref 8.5–10.1)
CHLORIDE SERPL-SCNC: 97 MMOL/L (ref 98–112)
CK SERPL-CCNC: 99 U/L
CO2 SERPL-SCNC: 29 MMOL/L (ref 21–32)
CREAT BLD-MCNC: 0.66 MG/DL
DEPRECATED RDW RBC AUTO: 51.2 FL (ref 35.1–46.3)
ERYTHROCYTE [DISTWIDTH] IN BLOOD BY AUTOMATED COUNT: 16 % (ref 11–15)
GLUCOSE BLD-MCNC: 99 MG/DL (ref 70–99)
HCT VFR BLD AUTO: 33.1 %
HGB BLD-MCNC: 10.3 G/DL
MCH RBC QN AUTO: 27.1 PG (ref 26–34)
MCHC RBC AUTO-ENTMCNC: 31.1 G/DL (ref 31–37)
MCV RBC AUTO: 87.1 FL
OSMOLALITY SERPL CALC.SUM OF ELEC: 277 MOSM/KG (ref 275–295)
PLATELET # BLD AUTO: 501 10(3)UL (ref 150–450)
POTASSIUM SERPL-SCNC: 3.3 MMOL/L (ref 3.5–5.1)
RBC # BLD AUTO: 3.8 X10(6)UL
SODIUM SERPL-SCNC: 134 MMOL/L (ref 136–145)
WBC # BLD AUTO: 11 X10(3) UL (ref 4–11)

## 2022-03-28 PROCEDURE — 80048 BASIC METABOLIC PNL TOTAL CA: CPT | Performed by: HOSPITALIST

## 2022-03-28 PROCEDURE — 97116 GAIT TRAINING THERAPY: CPT

## 2022-03-28 PROCEDURE — 82550 ASSAY OF CK (CPK): CPT | Performed by: HOSPITALIST

## 2022-03-28 PROCEDURE — 97530 THERAPEUTIC ACTIVITIES: CPT

## 2022-03-28 PROCEDURE — 85027 COMPLETE CBC AUTOMATED: CPT | Performed by: HOSPITALIST

## 2022-03-28 PROCEDURE — 97110 THERAPEUTIC EXERCISES: CPT

## 2022-03-28 RX ORDER — TRAMADOL HYDROCHLORIDE 50 MG/1
TABLET ORAL EVERY 6 HOURS PRN
Qty: 30 TABLET | Refills: 0 | Status: SHIPPED | OUTPATIENT
Start: 2022-03-28

## 2022-03-28 RX ORDER — POTASSIUM CHLORIDE 20 MEQ/1
40 TABLET, EXTENDED RELEASE ORAL ONCE
Status: COMPLETED | OUTPATIENT
Start: 2022-03-28 | End: 2022-03-28

## 2022-03-28 NOTE — PLAN OF CARE
Problem: Patient Centered Care  Goal: Patient preferences are identified and integrated in the patient's plan of care  Description: Interventions:  - What would you like us to know as we care for you?  I had my other knee done in December; I fell out of bed two weeks ago and dislocated my shoulder  - Provide timely, complete, and accurate information to patient/family  - Incorporate patient and family knowledge, values, beliefs, and cultural backgrounds into the planning and delivery of care  - Encourage patient/family to participate in care and decision-making at the level they choose  - Honor patient and family perspectives and choices  Outcome: Completed     Problem: METABOLIC/FLUID AND ELECTROLYTES - ADULT  Goal: Electrolytes maintained within normal limits  Description: INTERVENTIONS:  - Monitor labs and rhythm and assess patient for signs and symptoms of electrolyte imbalances  - Administer electrolyte replacement as ordered  - Monitor response to electrolyte replacements, including rhythm and repeat lab results as appropriate  - Fluid restriction as ordered  - Instruct patient on fluid and nutrition restrictions as appropriate  Outcome: Completed     Problem: HEMATOLOGIC - ADULT  Goal: Maintains hematologic stability  Description: INTERVENTIONS  - Assess for signs and symptoms of bleeding or hemorrhage  - Monitor labs and vital signs for trends  - Administer supportive blood products/factors, fluids and medications as ordered and appropriate  - Administer supportive blood products/factors as ordered and appropriate  Outcome: Completed     Problem: MUSCULOSKELETAL - ADULT  Goal: Return mobility to safest level of function  Description: INTERVENTIONS:  - Assess patient stability and activity tolerance for standing, transferring and ambulating w/ or w/o assistive devices  - Assist with transfers and ambulation using safe patient handling equipment as needed  - Ensure adequate protection for wounds/incisions during mobilization  - Obtain PT/OT consults as needed  - Advance activity as appropriate  - Communicate ordered activity level and limitations with patient/family  Outcome: Completed  Goal: Maintain proper alignment of affected body part  Description: INTERVENTIONS:  - Support and protect limb and body alignment per provider's orders  - Instruct and reinforce with patient and family use of appropriate assistive device and precautions (e.g. spinal or hip dislocation precautions)  Outcome: Completed  Patient cleared for discharge. After visit summary reviewed with patient and wife. Aware to  prescriptions at pharmacy on file. Personal belongings sent with patient, transported via private vehicle.

## 2022-03-28 NOTE — PLAN OF CARE
A&O x4. Right knee dressing c/d/I.  CMS intact. Pain managed with scheduled tylenol ex & tramadol. OOB with assist using walker, WBAT. Fall precautions in place. Call light in reach & bed alarm on. Plan to discharge home today with [facility]. Problem: Patient Centered Care  Goal: Patient preferences are identified and integrated in the patient's plan of care  Description: Interventions:  - What would you like us to know as we care for you?  I had my other knee done in December; I fell out of bed two weeks ago and dislocated my shoulder  - Provide timely, complete, and accurate information to patient/family  - Incorporate patient and family knowledge, values, beliefs, and cultural backgrounds into the planning and delivery of care  - Encourage patient/family to participate in care and decision-making at the level they choose  - Honor patient and family perspectives and choices  Outcome: Progressing     Problem: METABOLIC/FLUID AND ELECTROLYTES - ADULT  Goal: Electrolytes maintained within normal limits  Description: INTERVENTIONS:  - Monitor labs and rhythm and assess patient for signs and symptoms of electrolyte imbalances  - Administer electrolyte replacement as ordered  - Monitor response to electrolyte replacements, including rhythm and repeat lab results as appropriate  - Fluid restriction as ordered  - Instruct patient on fluid and nutrition restrictions as appropriate  Outcome: Progressing     Problem: HEMATOLOGIC - ADULT  Goal: Maintains hematologic stability  Description: INTERVENTIONS  - Assess for signs and symptoms of bleeding or hemorrhage  - Monitor labs and vital signs for trends  - Administer supportive blood products/factors, fluids and medications as ordered and appropriate  - Administer supportive blood products/factors as ordered and appropriate  Outcome: Progressing     Problem: MUSCULOSKELETAL - ADULT  Goal: Return mobility to safest level of function  Description: INTERVENTIONS:  - Assess patient stability and activity tolerance for standing, transferring and ambulating w/ or w/o assistive devices  - Assist with transfers and ambulation using safe patient handling equipment as needed  - Ensure adequate protection for wounds/incisions during mobilization  - Obtain PT/OT consults as needed  - Advance activity as appropriate  - Communicate ordered activity level and limitations with patient/family  Outcome: Progressing  Goal: Maintain proper alignment of affected body part  Description: INTERVENTIONS:  - Support and protect limb and body alignment per provider's orders  - Instruct and reinforce with patient and family use of appropriate assistive device and precautions (e.g. spinal or hip dislocation precautions)  Outcome: Progressing     Problem: Patient Centered Care  Goal: Patient preferences are identified and integrated in the patient's plan of care  Description: Interventions:  - What would you like us to know as we care for you?  I had my other knee done in December; I fell out of bed two weeks ago and dislocated my shoulder  - Provide timely, complete, and accurate information to patient/family  - Incorporate patient and family knowledge, values, beliefs, and cultural backgrounds into the planning and delivery of care  - Encourage patient/family to participate in care and decision-making at the level they choose  - Honor patient and family perspectives and choices  Outcome: Progressing     Problem: METABOLIC/FLUID AND ELECTROLYTES - ADULT  Goal: Electrolytes maintained within normal limits  Description: INTERVENTIONS:  - Monitor labs and rhythm and assess patient for signs and symptoms of electrolyte imbalances  - Administer electrolyte replacement as ordered  - Monitor response to electrolyte replacements, including rhythm and repeat lab results as appropriate  - Fluid restriction as ordered  - Instruct patient on fluid and nutrition restrictions as appropriate  Outcome: Progressing     Problem: HEMATOLOGIC - ADULT  Goal: Maintains hematologic stability  Description: INTERVENTIONS  - Assess for signs and symptoms of bleeding or hemorrhage  - Monitor labs and vital signs for trends  - Administer supportive blood products/factors, fluids and medications as ordered and appropriate  - Administer supportive blood products/factors as ordered and appropriate  Outcome: Progressing     Problem: MUSCULOSKELETAL - ADULT  Goal: Return mobility to safest level of function  Description: INTERVENTIONS:  - Assess patient stability and activity tolerance for standing, transferring and ambulating w/ or w/o assistive devices  - Assist with transfers and ambulation using safe patient handling equipment as needed  - Ensure adequate protection for wounds/incisions during mobilization  - Obtain PT/OT consults as needed  - Advance activity as appropriate  - Communicate ordered activity level and limitations with patient/family  Outcome: Progressing  Goal: Maintain proper alignment of affected body part  Description: INTERVENTIONS:  - Support and protect limb and body alignment per provider's orders  - Instruct and reinforce with patient and family use of appropriate assistive device and precautions (e.g. spinal or hip dislocation precautions)  Outcome: Progressing

## 2022-03-28 NOTE — PHYSICAL THERAPY NOTE
PHYSICAL THERAPY TREATMENT NOTE - INPATIENT     Room Number: 574/734-Z       Presenting Problem:  (s/p L TKA)    Problem List  Principal Problem:    Arthritis of right knee  Active Problems:    S/P TKR (total knee replacement), right      PHYSICAL THERAPY ASSESSMENT   Chart reviewed. RN  approved participation in physical therapy. PPE worn by therapist: mask, gloves and goggles. Patient was wearing a mask during session. Patient presented in bed with  6/10 pain. Patient with good  progress towards goals during this session. Education provided on Total knee exercise protocol, Physical therapy plan of care and physiological benefits of out of bed mobility. Patient with good carryover. Bed mobility: Min assist  Transfers: Min assist  Gait Assistance: Contact guard assist  Distance (ft): 2 x 100  Assistive Device: Rolling walker  Pattern: L Decreased stance time          Pt seen daily. Min a for bed mobility and transfer. EOB sitting balance activity with emphasis on core stabilization   Family  present;family education. Pt amb 2 x 100 ft with RW and CGA;provided cuing for gait pattern as well as for postural awareness. Navigated 4 stairs with CGA. Ther ex. Patient was left in bedside chair at end of session with all needs in reach. The patient's Approx Degree of Impairment: 28.97% has been calculated based on documentation in the [facility] '6 clicks' Inpatient Basic Mobility Short Form. Research supports that patients with this level of impairment may benefit from Home with home health PT. . RN aware of patient status post session. DISCHARGE RECOMMENDATIONS  PT Discharge Recommendations: Home with home health PT     PLAN  PT Treatment Plan: Bed mobility; Endurance; Family education;Gait training    SUBJECTIVE   Pt reports being ready for PT RX    OBJECTIVE  Precautions: Bed/chair alarm    WEIGHT BEARING RESTRICTION  Weight Bearing Restriction: L lower extremity           L Lower Extremity: Weight Bearing as Tolerated    PAIN ASSESSMENT   Ratin  Location:  (knee)       BALANCE                                                                                                                       Static Sitting: Fair +  Dynamic Sitting: Fair +           Static Standing: Fair  Dynamic Standing: Fair -    ACTIVITY TOLERANCE                         O2 WALK       AM-PAC '6-Clicks' INPATIENT SHORT FORM - BASIC MOBILITY  How much difficulty does the patient currently have. .. Patient Difficulty: Turning over in bed (including adjusting bedclothes, sheets and blankets)?: None   Patient Difficulty: Sitting down on and standing up from a chair with arms (e.g., wheelchair, bedside commode, etc.): None   Patient Difficulty: Moving from lying on back to sitting on the side of the bed?: None   How much help from another person does the patient currently need. .. Help from Another: Moving to and from a bed to a chair (including a wheelchair)?: A Little   Help from Another: Need to walk in hospital room?: A Little   Help from Another: Climbing 3-5 steps with a railing?: A Little     AM-PAC Score:  Raw Score: 21   Approx Degree of Impairment: 28.97%   Standardized Score (AM-PAC Scale): 50.25   CMS Modifier (G-Code): CJ      Additional information:   THERAPEUTIC EXERCISES  Lower Extremity Ankle pumps  Glut sets  Heel slides  Quad sets     Position Supine       Patient End of Session: Up in chair;Call light within reach;RN aware of session/findings;Bracing education provided per handout; All patient questions and concerns addressed    CURRENT GOALS       Patient Goal Patient's self-stated goal is: to go home   Goal #1 Patient is able to demonstrate supine - sit EOB @ level: minimum assistance     Goal #1   Current Status Min a   Goal #2 Patient is able to demonstrate transfers Sit to/from Stand at assistance level: independent with walker - rolling     Goal #2  Current Status Min a   Goal #3 Patient is able to ambulate 20 feet with assist device: walker - rolling at assistance level: minimum assistance   Goal #3   Current Status Pt amb 2 x 100 ft with RW and CGA   Goal #4 Patient will negotiate 3 stairs/one curb w/ assistive device and supervision   Goal #4   Current Status Navigated 4 stairs with CGA   Goal #5 Patient to demonstrate independence with home activity/exercise instructions provided to patient in preparation for discharge.    Goal #5   Current Status In progress   Goal #6    Goal #6  Current Status

## 2022-03-28 NOTE — CM/SW NOTE
HEATHER followed up on DC planning. HEATHER received confirmation from RN stating she discussed with the family that [facility] PT is already arranged to see the pt in the home. The family is in contact with Karl fleming who will send a therapist to the house for Ray Gomez    No Ray Gomez needed from HEATHER at this time     Vanessa Richardson, LSW, MSW ext.  [phone]

## 2022-04-05 ENCOUNTER — OFFICE VISIT (OUTPATIENT)
Dept: FAMILY MEDICINE CLINIC | Facility: CLINIC | Age: 62
End: 2022-04-05
Payer: COMMERCIAL

## 2022-04-05 VITALS
HEIGHT: 70 IN | WEIGHT: 228 LBS | SYSTOLIC BLOOD PRESSURE: 133 MMHG | DIASTOLIC BLOOD PRESSURE: 73 MMHG | RESPIRATION RATE: 17 BRPM | HEART RATE: 103 BPM | BODY MASS INDEX: 32.64 KG/M2

## 2022-04-05 DIAGNOSIS — I10 ESSENTIAL HYPERTENSION: ICD-10-CM

## 2022-04-05 DIAGNOSIS — D64.9 NORMOCYTIC ANEMIA: ICD-10-CM

## 2022-04-05 DIAGNOSIS — M25.50 MULTIPLE JOINT PAIN: Primary | ICD-10-CM

## 2022-04-05 DIAGNOSIS — R73.03 PREDIABETES: ICD-10-CM

## 2022-04-05 PROCEDURE — 3075F SYST BP GE 130 - 139MM HG: CPT | Performed by: FAMILY MEDICINE

## 2022-04-05 PROCEDURE — 99204 OFFICE O/P NEW MOD 45 MIN: CPT | Performed by: FAMILY MEDICINE

## 2022-04-05 PROCEDURE — 3078F DIAST BP <80 MM HG: CPT | Performed by: FAMILY MEDICINE

## 2022-04-05 PROCEDURE — 3008F BODY MASS INDEX DOCD: CPT | Performed by: FAMILY MEDICINE

## 2022-04-05 RX ORDER — LISINOPRIL 10 MG/1
10 TABLET ORAL DAILY
Qty: 90 TABLET | Refills: 0 | Status: SHIPPED | OUTPATIENT
Start: 2022-04-05

## 2022-04-05 RX ORDER — CHLORTHALIDONE 25 MG/1
25 TABLET ORAL DAILY
Qty: 90 TABLET | Refills: 0 | Status: SHIPPED | OUTPATIENT
Start: 2022-04-05

## 2022-04-05 RX ORDER — ETODOLAC 400 MG/1
400 TABLET, FILM COATED ORAL DAILY
Qty: 30 TABLET | Refills: 0 | Status: SHIPPED | OUTPATIENT
Start: 2022-04-05

## 2022-04-13 ENCOUNTER — LAB ENCOUNTER (OUTPATIENT)
Dept: LAB | Facility: HOSPITAL | Age: 62
End: 2022-04-13
Attending: UROLOGY
Payer: COMMERCIAL

## 2022-04-13 ENCOUNTER — HOSPITAL ENCOUNTER (OUTPATIENT)
Dept: CT IMAGING | Facility: HOSPITAL | Age: 62
Discharge: HOME | End: 2022-04-13
Attending: UROLOGY
Payer: COMMERCIAL

## 2022-04-13 DIAGNOSIS — R31.29 HEMATURIA, MICROSCOPIC: Primary | ICD-10-CM

## 2022-04-13 DIAGNOSIS — Z12.5 SCREENING FOR PROSTATE CANCER: ICD-10-CM

## 2022-04-13 DIAGNOSIS — R31.29 HEMATURIA, MICROSCOPIC: ICD-10-CM

## 2022-04-13 LAB
COMPLEXED PSA SERPL-MCNC: 0.38 NG/ML (ref ?–4)
NON GYNE INTERPRETATION: NEGATIVE

## 2022-04-13 PROCEDURE — 87086 URINE CULTURE/COLONY COUNT: CPT

## 2022-04-13 PROCEDURE — 74178 CT ABD&PLV WO CNTR FLWD CNTR: CPT | Performed by: UROLOGY

## 2022-04-13 PROCEDURE — 76377 3D RENDER W/INTRP POSTPROCES: CPT | Performed by: UROLOGY

## 2022-04-13 PROCEDURE — 88108 CYTOPATH CONCENTRATE TECH: CPT

## 2022-04-13 PROCEDURE — 36415 COLL VENOUS BLD VENIPUNCTURE: CPT

## 2022-04-18 ENCOUNTER — TELEPHONE (OUTPATIENT)
Dept: FAMILY MEDICINE CLINIC | Facility: CLINIC | Age: 62
End: 2022-04-18

## 2022-04-18 LAB
ABSOLUTE BASOPHILS: 77 CELLS/UL (ref 0–200)
ABSOLUTE EOSINOPHILS: 355 CELLS/UL (ref 15–500)
ABSOLUTE LYMPHOCYTES: 1670 CELLS/UL (ref 850–3900)
ABSOLUTE MONOCYTES: 1152 CELLS/UL (ref 200–950)
ABSOLUTE NEUTROPHILS: 6346 CELLS/UL (ref 1500–7800)
ANACHOICE(R) SCREEN: NEGATIVE
BASOPHILS: 0.8 %
CHOL/HDLC RATIO: 2.8 (CALC)
CHOLESTEROL, TOTAL: 116 MG/DL
EOSINOPHILS: 3.7 %
HDL CHOLESTEROL: 42 MG/DL
HEMATOCRIT: 33.5 % (ref 38.5–50)
HEMOGLOBIN: 10.7 G/DL (ref 13.2–17.1)
LDL-CHOLESTEROL: 60 MG/DL (CALC)
LYMPHOCYTES: 17.4 %
MCH: 26.8 PG (ref 27–33)
MCHC: 31.9 G/DL (ref 32–36)
MCV: 83.8 FL (ref 80–100)
MONOCYTES: 12 %
MPV: 8 FL (ref 7.5–12.5)
NEUTROPHILS: 66.1 %
NON-HDL CHOLESTEROL: 74 MG/DL (CALC)
PLATELET COUNT: 728 THOUSAND/UL (ref 140–400)
RDW: 14.5 % (ref 11–15)
RED BLOOD CELL COUNT: 4 MILLION/UL (ref 4.2–5.8)
RHEUMATOID FACTOR: <14 IU/ML
SED RATE BY MODIFIED$WESTERGREN: 108 MM/H
TRIGLYCERIDES: 55 MG/DL
WHITE BLOOD CELL COUNT: 9.6 THOUSAND/UL (ref 3.8–10.8)

## 2022-04-18 NOTE — TELEPHONE ENCOUNTER
Dr. Sherine Rodriguez Mock to wait until 5/3 to see you? Or do you want to see them on 4/27/22? RN called patient regarding test results, 4/15/22. Patient's date of birth and full name both confirmed. Wife present as well. RN informed patient of provider's message as detailed in test results, 4/15/22. They verbalize understanding. And agreeable to plan to re-test blood work in 1 week. But have questions. They wonder when they need to be concerned about his issues. Right Wrist swelling again, same as it was on 4/5/22. Seems to come and go. Denies that swelling is worse than it was on 4/5/22. Also he's more fatigued, but alert and oriented. And still having pain in left arm down to wrist.     They decline appointment with another PCP. They have Surgeon Appointment tomorrow. RN advised patient to call us back if symptoms persist.   RN advised if symptoms get severely worse, patient should seek care at Emergency Room or Immediate Care. RN also informed patient to seek immediate medical attention at ER if patient experiences severe/worsening symptoms, shortness of breath, chest pain, or severe pain. Patient verbalizes understanding and is agreeable to instructions.          Future Appointments   Date Time Provider Red Cochran   5/3/2022 11:15 AM Tod Padilla MD [facility] [place]

## 2022-04-19 NOTE — TELEPHONE ENCOUNTER
Advised patient of Dr. Majano List note. Patient verbalized understanding. Appointment made.   Future Appointments   Date Time Provider Red Cochran   4/27/2022 10:15 AM [facility]   5/3/2022 11:15 AM Dandre Davis MD [facility] [place]

## 2022-04-19 NOTE — TELEPHONE ENCOUNTER
I can see him in the 27th but he should complete the blood work at least the day before, if he the blood work comes the same this can explain his symptoms but agree if symptoms regarding fatigue worsen he should be re-evaluated before.

## 2022-04-23 LAB
% SATURATION: 10 % (CALC) (ref 20–48)
ABSOLUTE BASOPHILS: 81 CELLS/UL (ref 0–200)
ABSOLUTE EOSINOPHILS: 432 CELLS/UL (ref 15–500)
ABSOLUTE LYMPHOCYTES: 1863 CELLS/UL (ref 850–3900)
ABSOLUTE MONOCYTES: 1062 CELLS/UL (ref 200–950)
ABSOLUTE NEUTROPHILS: 5562 CELLS/UL (ref 1500–7800)
BASOPHILS: 0.9 %
EOSINOPHILS: 4.8 %
FERRITIN: 555 NG/ML (ref 24–380)
FOLATE, SERUM: 5.9 NG/ML
HEMATOCRIT: 31.5 % (ref 38.5–50)
HEMOGLOBIN: 9.9 G/DL (ref 13.2–17.1)
IRON BINDING CAPACITY: 215 MCG/DL (CALC) (ref 250–425)
IRON, TOTAL: 21 MCG/DL (ref 50–180)
LYMPHOCYTES: 20.7 %
MCH: 26.3 PG (ref 27–33)
MCHC: 31.4 G/DL (ref 32–36)
MCV: 83.6 FL (ref 80–100)
MONOCYTES: 11.8 %
MPV: 8.4 FL (ref 7.5–12.5)
NEUTROPHILS: 61.8 %
PLATELET COUNT: 704 THOUSAND/UL (ref 140–400)
RDW: 14.8 % (ref 11–15)
RED BLOOD CELL COUNT: 3.77 MILLION/UL (ref 4.2–5.8)
SED RATE BY MODIFIED$WESTERGREN: >130 MM/H
VITAMIN B12: 367 PG/ML (ref 200–1100)
WHITE BLOOD CELL COUNT: 9 THOUSAND/UL (ref 3.8–10.8)

## 2022-04-27 ENCOUNTER — OFFICE VISIT (OUTPATIENT)
Dept: FAMILY MEDICINE CLINIC | Facility: CLINIC | Age: 62
End: 2022-04-27
Payer: COMMERCIAL

## 2022-04-27 VITALS
BODY MASS INDEX: 34.72 KG/M2 | RESPIRATION RATE: 18 BRPM | HEART RATE: 91 BPM | HEIGHT: 70 IN | SYSTOLIC BLOOD PRESSURE: 134 MMHG | WEIGHT: 242.5 LBS | DIASTOLIC BLOOD PRESSURE: 77 MMHG

## 2022-04-27 DIAGNOSIS — G62.9 POLYNEUROPATHY: Primary | ICD-10-CM

## 2022-04-27 DIAGNOSIS — M25.50 MULTIPLE JOINT PAIN: ICD-10-CM

## 2022-04-27 DIAGNOSIS — D64.9 NORMOCYTIC ANEMIA: ICD-10-CM

## 2022-04-27 DIAGNOSIS — D75.839 THROMBOCYTOSIS: ICD-10-CM

## 2022-04-27 PROCEDURE — 99213 OFFICE O/P EST LOW 20 MIN: CPT | Performed by: FAMILY MEDICINE

## 2022-04-27 PROCEDURE — 3008F BODY MASS INDEX DOCD: CPT | Performed by: FAMILY MEDICINE

## 2022-04-27 PROCEDURE — 3075F SYST BP GE 130 - 139MM HG: CPT | Performed by: FAMILY MEDICINE

## 2022-04-27 PROCEDURE — 3078F DIAST BP <80 MM HG: CPT | Performed by: FAMILY MEDICINE

## 2022-04-27 RX ORDER — CEPHALEXIN 500 MG/1
CAPSULE ORAL
COMMUNITY
Start: 2022-04-25 | End: 2022-04-27

## 2022-04-27 RX ORDER — MELOXICAM 7.5 MG/1
7.5 TABLET ORAL DAILY
COMMUNITY

## 2022-04-27 RX ORDER — GABAPENTIN 100 MG/1
CAPSULE ORAL
Qty: 84 CAPSULE | Refills: 0 | Status: SHIPPED | OUTPATIENT
Start: 2022-04-27 | End: 2022-05-26

## 2022-04-27 RX ORDER — OMEPRAZOLE 40 MG/1
40 CAPSULE, DELAYED RELEASE ORAL DAILY
Qty: 30 CAPSULE | Refills: 0 | Status: SHIPPED | OUTPATIENT
Start: 2022-04-27 | End: 2022-05-27

## 2022-04-28 RX ORDER — CHLORTHALIDONE 25 MG/1
25 TABLET ORAL DAILY
Qty: 90 TABLET | Refills: 0 | OUTPATIENT
Start: 2022-04-28

## 2022-04-28 RX ORDER — ETODOLAC 400 MG/1
TABLET, FILM COATED ORAL
Qty: 30 TABLET | Refills: 0 | Status: SHIPPED | OUTPATIENT
Start: 2022-04-28 | End: 2022-06-02

## 2022-04-28 RX ORDER — LISINOPRIL 10 MG/1
TABLET ORAL
Qty: 90 TABLET | Refills: 0 | OUTPATIENT
Start: 2022-04-28

## 2022-04-28 NOTE — TELEPHONE ENCOUNTER
Refill passed per [facility] protocol. Requested Prescriptions   Pending Prescriptions Disp Refills    LISINOPRIL 10 MG Oral Tab [Pharmacy Med Name: LISINOPRIL 10 MG TABLET] 90 tablet 0     Sig: TAKE 1 TABLET BY MOUTH EVERY DAY        Hypertensive Medications Protocol Passed - 4/27/2022  8:00 PM        Passed - CMP or BMP in past 12 months        Passed - Appointment in past 6 or next 3 months        Passed - GFR Non- > 50     Lab Results   Component Value Date    [facility] 102 03/28/2022                    CHLORTHALIDONE 25 MG Oral Tab [Pharmacy Med Name: CHLORTHALIDONE 25 MG TABLET] 90 tablet 0     Sig: Take 1 tablet (25 mg total) by mouth daily.         Hypertensive Medications Protocol Passed - 4/27/2022  8:00 PM        Passed - CMP or BMP in past 12 months        Passed - Appointment in past 6 or next 3 months        Passed - GFR Non- > 50     Lab Results   Component Value Date    [facility] 102 03/28/2022                    ETODOLAC 400 MG Oral Tab [Pharmacy Med Name: ETODOLAC 400 MG TABLET] 30 tablet 0     Sig: TAKE 1 TABLET BY MOUTH EVERY DAY        Non-Narcotic Pain Medication Protocol Passed - 4/27/2022  8:00 PM        Passed - Appointment in past 6 or next 3 months            Recent Outpatient Visits              Yesterday Polyneuropathy    [facility] Brittany Brower MD    Office Visit    3 weeks ago Multiple joint pain    [facility] Brittany Brower MD    Office Visit    1 month ago Pre-operative laboratory examination    [address] [facility] Marcelo Nesbitt MD    Office Visit    4 months ago Pre-operative laboratory examination    [facility]    [facility]    8 months ago Primary osteoarthritis of left knee    Orthopaedics - Vaishnavi Perez MD    Office Visit          Future Appointments         Provider Department Appt Notes    In 5 days Pravin Toussaint MD [address] Family Medicine PHYSICAL  POLICY INF *BA    In 3 weeks Vilma Delacruz 19 Hematology Oncology [id number]-referred by Janina Lynne

## 2022-05-20 ENCOUNTER — NURSE ONLY (OUTPATIENT)
Dept: HEMATOLOGY/ONCOLOGY | Facility: HOSPITAL | Age: 62
End: 2022-05-20
Attending: INTERNAL MEDICINE
Payer: COMMERCIAL

## 2022-05-20 VITALS
DIASTOLIC BLOOD PRESSURE: 87 MMHG | HEIGHT: 70 IN | SYSTOLIC BLOOD PRESSURE: 139 MMHG | WEIGHT: 238 LBS | HEART RATE: 89 BPM | TEMPERATURE: 98 F | RESPIRATION RATE: 16 BRPM | BODY MASS INDEX: 34.07 KG/M2 | OXYGEN SATURATION: 97 %

## 2022-05-20 DIAGNOSIS — M25.50 POLYARTHRALGIA: ICD-10-CM

## 2022-05-20 DIAGNOSIS — R70.0 ELEVATED SED RATE: ICD-10-CM

## 2022-05-20 DIAGNOSIS — D64.9 NORMOCYTIC ANEMIA: Primary | ICD-10-CM

## 2022-05-20 DIAGNOSIS — D50.9 IRON DEFICIENCY ANEMIA, UNSPECIFIED IRON DEFICIENCY ANEMIA TYPE: ICD-10-CM

## 2022-05-20 PROCEDURE — 99204 OFFICE O/P NEW MOD 45 MIN: CPT | Performed by: INTERNAL MEDICINE

## 2022-05-20 PROCEDURE — 36415 COLL VENOUS BLD VENIPUNCTURE: CPT

## 2022-05-20 RX ORDER — FERROUS SULFATE 325(65) MG
325 TABLET ORAL 3 TIMES DAILY
Qty: 90 TABLET | Refills: 3 | Status: SHIPPED | OUTPATIENT
Start: 2022-05-20

## 2022-05-20 RX ORDER — FOLIC ACID 1 MG/1
1 TABLET ORAL DAILY
Qty: 30 TABLET | Refills: 3 | Status: SHIPPED | OUTPATIENT
Start: 2022-05-20

## 2022-05-21 LAB — SOLUBLE TRANSFERRIN RECEPTOR: 4.9 MG/L

## 2022-05-23 ENCOUNTER — NURSE TRIAGE (OUTPATIENT)
Dept: FAMILY MEDICINE CLINIC | Facility: CLINIC | Age: 62
End: 2022-05-23

## 2022-05-23 ENCOUNTER — PATIENT MESSAGE (OUTPATIENT)
Dept: FAMILY MEDICINE CLINIC | Facility: CLINIC | Age: 62
End: 2022-05-23

## 2022-05-23 LAB — B BURGDOR IGG+IGM SER QL: NEGATIVE

## 2022-05-23 RX ORDER — ECHINACEA PURPUREA EXTRACT 125 MG
1 TABLET ORAL AS NEEDED
Qty: 60 ML | Refills: 0 | Status: SHIPPED | OUTPATIENT
Start: 2022-05-23

## 2022-05-23 RX ORDER — FLUTICASONE PROPIONATE 50 MCG
2 SPRAY, SUSPENSION (ML) NASAL DAILY
Qty: 16 G | Refills: 0 | Status: SHIPPED | OUTPATIENT
Start: 2022-05-23 | End: 2022-06-02

## 2022-05-23 NOTE — TELEPHONE ENCOUNTER
Please inform patient I sent a prescription for nasal saline to the washes 3 times a day and Flonase to do once a day, if she continues to have significant symptoms to let me know, he can also increase his water intake as this can help with the fluidity of the mucus

## 2022-05-23 NOTE — TELEPHONE ENCOUNTER
From: Radha Savage  To: Talia Wills. 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 Given all of his meds what can he take that will safely interact with the Tylenol and others? It has been quite persistent the last couple of days. We assume the weather is the factor.

## 2022-05-23 NOTE — TELEPHONE ENCOUNTER
The patient's wife Ruthie Plascencia who is on HIPAA was notified with understanding. He will try. He will call back if needed.

## 2022-05-25 DIAGNOSIS — G62.9 POLYNEUROPATHY: ICD-10-CM

## 2022-05-25 NOTE — TELEPHONE ENCOUNTER
Please review. Protocol passed. Please advise if patient should continue taking? . Polyneuropathy  Will start course of gabapentin with a goal of decreasing intake of anti-inflammatories, patient will be following up with Ortho tomorrow  - gabapentin 100 MG Oral Cap; Take 1 capsule (100 mg total) by mouth nightly for 1 day, THEN 1 capsule (100 mg total) 2 (two) times daily for 1 day, THEN 1 capsule (100 mg total) 3 (three) times daily for 27 days. Dispense: 84 capsule; Refill: 0  Requested Prescriptions   Pending Prescriptions Disp Refills    GABAPENTIN 100 MG Oral Cap [Pharmacy Med Name: GABAPENTIN 100 MG CAPSULE] 84 capsule 0     Sig: TAEK 1 CAPSULE BY MOUTH AT NIGHT FOR 1 DAY, THEN 1 CAPSULE TWICE DAILY FOR 1 DAY THEN 1 CAPSULE 3 TIMES DAILY FOR 27 DAYS        Neurology Medications Passed - 5/25/2022  3:39 PM        Passed - Appointment in the past 6 or next 3 months              Recent Outpatient Visits              5 days ago     [facility] Hematology Oncology    Nurse Only    5 days ago Normocytic anemia    [facility] Hematology Oncology Miracle Carballo MD    Office Visit    4 weeks ago Polyneuropathy    [address] Temitope Pleitez MD    Office Visit    1 month ago Multiple joint pain    [address] Temitope Pleitez MD    Office Visit    2 months ago Pre-operative laboratory examination    [address] Asia Magallon MD    Office Visit            Future Appointments         Provider Department Appt Notes    In 1 month [place], [facility], [facility] 84 colon screen / informed of policies    In 3 months Vilma Charles 19 Hematology Oncology FOLLOW UP VISIT. CL  \"will have labs out pt\"  3M

## 2022-05-26 RX ORDER — GABAPENTIN 100 MG/1
100 CAPSULE ORAL 3 TIMES DAILY
Qty: 90 CAPSULE | Refills: 5 | Status: SHIPPED | OUTPATIENT
Start: 2022-05-26

## 2022-06-02 ENCOUNTER — OFFICE VISIT (OUTPATIENT)
Dept: FAMILY MEDICINE CLINIC | Facility: CLINIC | Age: 62
End: 2022-06-02
Payer: COMMERCIAL

## 2022-06-02 ENCOUNTER — PATIENT MESSAGE (OUTPATIENT)
Dept: FAMILY MEDICINE CLINIC | Facility: CLINIC | Age: 62
End: 2022-06-02

## 2022-06-02 VITALS
DIASTOLIC BLOOD PRESSURE: 73 MMHG | WEIGHT: 237.38 LBS | RESPIRATION RATE: 20 BRPM | HEART RATE: 117 BPM | BODY MASS INDEX: 33.98 KG/M2 | HEIGHT: 70 IN | SYSTOLIC BLOOD PRESSURE: 117 MMHG

## 2022-06-02 DIAGNOSIS — Z12.2 SCREENING FOR LUNG CANCER: ICD-10-CM

## 2022-06-02 DIAGNOSIS — R70.0 ESR RAISED: ICD-10-CM

## 2022-06-02 DIAGNOSIS — I10 ESSENTIAL HYPERTENSION: Primary | ICD-10-CM

## 2022-06-02 DIAGNOSIS — M25.50 MULTIPLE JOINT PAIN: ICD-10-CM

## 2022-06-02 DIAGNOSIS — G62.9 POLYNEUROPATHY: ICD-10-CM

## 2022-06-02 PROCEDURE — 3074F SYST BP LT 130 MM HG: CPT | Performed by: FAMILY MEDICINE

## 2022-06-02 PROCEDURE — 3078F DIAST BP <80 MM HG: CPT | Performed by: FAMILY MEDICINE

## 2022-06-02 PROCEDURE — 3008F BODY MASS INDEX DOCD: CPT | Performed by: FAMILY MEDICINE

## 2022-06-02 PROCEDURE — 99214 OFFICE O/P EST MOD 30 MIN: CPT | Performed by: FAMILY MEDICINE

## 2022-06-02 RX ORDER — MELOXICAM 15 MG/1
15 TABLET ORAL DAILY
Qty: 30 TABLET | Refills: 1 | Status: SHIPPED | OUTPATIENT
Start: 2022-06-02

## 2022-06-02 RX ORDER — PREGABALIN 75 MG/1
75 CAPSULE ORAL 2 TIMES DAILY
Qty: 60 CAPSULE | Refills: 0 | Status: SHIPPED | OUTPATIENT
Start: 2022-06-02 | End: 2022-07-02

## 2022-06-02 RX ORDER — LISINOPRIL 10 MG/1
5 TABLET ORAL DAILY
Qty: 90 TABLET | Refills: 0 | Status: SHIPPED | OUTPATIENT
Start: 2022-06-02

## 2022-06-02 RX ORDER — TAMSULOSIN HYDROCHLORIDE 0.4 MG/1
0.4 CAPSULE ORAL DAILY
COMMUNITY
Start: 2022-05-19

## 2022-06-02 RX ORDER — MELOXICAM 15 MG/1
TABLET ORAL
COMMUNITY
Start: 2022-05-28 | End: 2022-06-02

## 2022-06-02 NOTE — TELEPHONE ENCOUNTER
From: Gal Savage  To: Keila Gonzáles. Dipak Guardado MD  Sent: 6/2/2022 4:35 PM CDT  Subject: Referral    Dr. Wilberto Barraza, thank you for taking the time to listen and help Marco Hudson today. Neither of us can believe everything he is going through or why, nor are we giving up. He wants and needs to get better. At the very least we need to know what is causing his issues, outside of course, the knees and shoulders. I made an appointment with Dr. Krzysztof Santoyo for June 28th. That was the first available at any location and several weeks away. Is there a chance you could put in a call on [phone] [facility] behalf to see if we could get something sooner? Thank you.

## 2022-06-03 NOTE — TELEPHONE ENCOUNTER
Could he get in to see Dr. Ephraim Hayes or Dr. Gema Espinoza earlier?     We can put him on a wait list.

## 2022-06-03 NOTE — TELEPHONE ENCOUNTER
Spoke to patient wife, Asia Barraza and was able to schedule patient. Patient is scheduled to see Dr. Cooper Chavez, this Monday. Future Appointments   Date Time Provider Red Cochran   6/5/2022  9:30 AM LMB CT RM1 [facility].  CT EM [place]   6/6/2022  4:00 PM Jose Angel Delgado MD 2014 [facility]

## 2022-06-05 ENCOUNTER — HOSPITAL ENCOUNTER (OUTPATIENT)
Dept: CT IMAGING | Age: 62
Discharge: HOME | End: 2022-06-05
Attending: FAMILY MEDICINE
Payer: COMMERCIAL

## 2022-06-05 ENCOUNTER — HOSPITAL ENCOUNTER (OUTPATIENT)
Dept: CT IMAGING | Age: 62
End: 2022-06-05
Attending: FAMILY MEDICINE
Payer: COMMERCIAL

## 2022-06-05 DIAGNOSIS — Z12.2 SCREENING FOR LUNG CANCER: ICD-10-CM

## 2022-06-05 PROCEDURE — 71271 CT THORAX LUNG CANCER SCR C-: CPT | Performed by: FAMILY MEDICINE

## 2022-06-06 ENCOUNTER — HOSPITAL ENCOUNTER (OUTPATIENT)
Dept: GENERAL RADIOLOGY | Facility: HOSPITAL | Age: 62
Discharge: HOME | End: 2022-06-06
Attending: INTERNAL MEDICINE
Payer: COMMERCIAL

## 2022-06-06 ENCOUNTER — OFFICE VISIT (OUTPATIENT)
Dept: RHEUMATOLOGY | Facility: CLINIC | Age: 62
End: 2022-06-06
Payer: COMMERCIAL

## 2022-06-06 VITALS
DIASTOLIC BLOOD PRESSURE: 73 MMHG | HEART RATE: 108 BPM | BODY MASS INDEX: 33.93 KG/M2 | SYSTOLIC BLOOD PRESSURE: 118 MMHG | WEIGHT: 237 LBS | HEIGHT: 70 IN

## 2022-06-06 DIAGNOSIS — M25.50 POLYARTHRALGIA: ICD-10-CM

## 2022-06-06 DIAGNOSIS — M25.50 POLYARTHRALGIA: Primary | ICD-10-CM

## 2022-06-06 DIAGNOSIS — M25.531 PAIN AND SWELLING OF RIGHT WRIST: ICD-10-CM

## 2022-06-06 DIAGNOSIS — M25.431 PAIN AND SWELLING OF RIGHT WRIST: ICD-10-CM

## 2022-06-06 DIAGNOSIS — N62 GYNECOMASTIA, MALE: ICD-10-CM

## 2022-06-06 DIAGNOSIS — I25.10 ATHEROSCLEROSIS OF NATIVE CORONARY ARTERY OF NATIVE HEART WITHOUT ANGINA PECTORIS: Primary | ICD-10-CM

## 2022-06-06 DIAGNOSIS — J43.2 CENTRILOBULAR EMPHYSEMA (HCC): ICD-10-CM

## 2022-06-06 PROCEDURE — 3074F SYST BP LT 130 MM HG: CPT | Performed by: INTERNAL MEDICINE

## 2022-06-06 PROCEDURE — 3078F DIAST BP <80 MM HG: CPT | Performed by: INTERNAL MEDICINE

## 2022-06-06 PROCEDURE — 3008F BODY MASS INDEX DOCD: CPT | Performed by: INTERNAL MEDICINE

## 2022-06-06 PROCEDURE — 73130 X-RAY EXAM OF HAND: CPT | Performed by: INTERNAL MEDICINE

## 2022-06-06 PROCEDURE — 73110 X-RAY EXAM OF WRIST: CPT | Performed by: INTERNAL MEDICINE

## 2022-06-06 PROCEDURE — 99205 OFFICE O/P NEW HI 60 MIN: CPT | Performed by: INTERNAL MEDICINE

## 2022-06-06 RX ORDER — TIOTROPIUM BROMIDE 18 UG/1
18 CAPSULE ORAL; RESPIRATORY (INHALATION) DAILY
Qty: 90 CAPSULE | Refills: 3 | Status: SHIPPED | OUTPATIENT
Start: 2022-06-06 | End: 2023-06-01

## 2022-06-06 RX ORDER — PREDNISONE 10 MG/1
TABLET ORAL
Qty: 60 TABLET | Refills: 0 | Status: SHIPPED | OUTPATIENT
Start: 2022-06-06

## 2022-06-06 RX ORDER — ROSUVASTATIN CALCIUM 5 MG/1
5 TABLET, COATED ORAL NIGHTLY
Qty: 90 TABLET | Refills: 0 | Status: SHIPPED | OUTPATIENT
Start: 2022-06-06

## 2022-06-06 NOTE — PATIENT INSTRUCTIONS
You were seen today for joint pain  Evaluate to see if anything autoimmune is going on  Blood work and x-rays today  Start prednisone 40 mg daily x3 days then 30 mg daily x3 days then 20 mg daily x3 days then 10 mg daily x3 days then stay on 5 mg daily until you see me  See me in 2 weeks

## 2022-06-15 LAB
C-REACTIVE PROTEIN: 151.8 MG/L
CYCLIC CITRULLINATED$PEPTIDE (CCP) AB (IGG): <16 UNITS
HEPATITIS B SURFACE$ANTIBODY QL: REACTIVE
HLA-B27 ANTIGEN: NEGATIVE
MITOGEN-NIL: >10 IU/ML
NIL: 0.02 IU/ML
QUANTIFERON(R)-TB GOLD PLUS, 1 TUBE: NEGATIVE
SED RATE BY MODIFIED$WESTERGREN: >130 MM/H
SIGNAL TO CUT-OFF: 0.21
TB1-NIL: 0.01 IU/ML
TB2-NIL: 0.01 IU/ML

## 2022-06-27 ENCOUNTER — OFFICE VISIT (OUTPATIENT)
Dept: GASTROENTEROLOGY | Facility: CLINIC | Age: 62
End: 2022-06-27
Payer: COMMERCIAL

## 2022-06-27 ENCOUNTER — TELEPHONE (OUTPATIENT)
Dept: GASTROENTEROLOGY | Facility: CLINIC | Age: 62
End: 2022-06-27

## 2022-06-27 VITALS
WEIGHT: 238 LBS | DIASTOLIC BLOOD PRESSURE: 76 MMHG | HEIGHT: 70 IN | BODY MASS INDEX: 34.07 KG/M2 | SYSTOLIC BLOOD PRESSURE: 114 MMHG | HEART RATE: 91 BPM

## 2022-06-27 DIAGNOSIS — D64.9 ANEMIA, UNSPECIFIED TYPE: Primary | ICD-10-CM

## 2022-06-27 DIAGNOSIS — Z12.11 SCREEN FOR COLON CANCER: Primary | ICD-10-CM

## 2022-06-27 DIAGNOSIS — D64.9 ANEMIA, UNSPECIFIED TYPE: ICD-10-CM

## 2022-06-27 DIAGNOSIS — Z12.11 COLON CANCER SCREENING: ICD-10-CM

## 2022-06-27 PROCEDURE — 3008F BODY MASS INDEX DOCD: CPT | Performed by: REGISTERED NURSE

## 2022-06-27 PROCEDURE — 3078F DIAST BP <80 MM HG: CPT | Performed by: REGISTERED NURSE

## 2022-06-27 PROCEDURE — 3074F SYST BP LT 130 MM HG: CPT | Performed by: REGISTERED NURSE

## 2022-06-27 PROCEDURE — S0285 CNSLT BEFORE SCREEN COLONOSC: HCPCS | Performed by: REGISTERED NURSE

## 2022-06-27 RX ORDER — SODIUM, POTASSIUM,MAG SULFATES 17.5-3.13G
SOLUTION, RECONSTITUTED, ORAL ORAL
Qty: 1 EACH | Refills: 0 | Status: SHIPPED | OUTPATIENT
Start: 2022-06-27

## 2022-06-27 RX ORDER — POLYETHYLENE GLYCOL 3350, SODIUM CHLORIDE, SODIUM BICARBONATE, POTASSIUM CHLORIDE 420; 11.2; 5.72; 1.48 G/4L; G/4L; G/4L; G/4L
POWDER, FOR SOLUTION ORAL
Qty: 4000 ML | Refills: 0 | Status: SHIPPED | OUTPATIENT
Start: 2022-06-27 | End: 2022-06-27 | Stop reason: CLARIF

## 2022-06-27 NOTE — TELEPHONE ENCOUNTER
Scheduled for:  Colonoscopy [phone] & EGD [id number]  Provider Name:  Dr. Chel Petit  Date:  08/25/2022  Location:  [facility]  Sedation:  MAC  Time:  11:00 am, arrival 10:00 am  Prep:  Suprep  Meds/Allergies Reconciled?:  Sheryl/APN reviewed. Diagnosis with codes:  Screening for colon cancer Z12.11; Anemia D64.9  Was patient informed to call insurance with codes (Y/N):  Yes  Referral sent?:  Referral was sent at the time of electronic surgical scheduling. [address] [place] [address] or [facility] notified?:  I sent an electronic request to Endo Scheduling and received a confirmation today. Medication Orders:  Hold Lisinopril the morning of procedure. Hold Iron for 7 days prior to procedure. Misc Orders:  Patient was informed that they will need a COVID 19 test prior to their procedure. Patient verbally understood & will await a phone call from [facility] to schedule. Further instructions given by staff:  Prep instructions were given to pt in the office, pt verbalized understanding.

## 2022-06-27 NOTE — PATIENT INSTRUCTIONS
1. Schedule EGD/colonoscopy with first available provider with MAC [Diagnosis: Anemia, colon cancer screening]    2.  bowel prep from pharmacy (split dose Trilyte)    3. Hold lisinopril day of procedure. Continue all other medications for procedure    4. Read all bowel prep instructions carefully    5. AVOID seeds, nuts, popcorn, raw fruits and vegetables (cooked is okay) for 2-3 days before procedure    6. You MAY need to go for COVID testing 72 hours before procedure. The testing team will call you a few days before your procedure to discuss with you if testing is required. If you are asked to go for COVID testing and do not completed the test, the procedure cannot be performed. 7. If you start any NEW medication after your visit today, please notify us. Certain medications will need to be held before the procedure, or the procedure cannot be performed.

## 2022-06-29 ENCOUNTER — OFFICE VISIT (OUTPATIENT)
Dept: RHEUMATOLOGY | Facility: CLINIC | Age: 62
End: 2022-06-29
Payer: COMMERCIAL

## 2022-06-29 VITALS — HEIGHT: 70 IN | BODY MASS INDEX: 34.07 KG/M2 | WEIGHT: 238 LBS

## 2022-06-29 DIAGNOSIS — M06.09 RHEUMATOID ARTHRITIS OF MULTIPLE SITES WITH NEGATIVE RHEUMATOID FACTOR (HCC): Primary | ICD-10-CM

## 2022-06-29 DIAGNOSIS — M25.50 POLYARTHRALGIA: ICD-10-CM

## 2022-06-29 DIAGNOSIS — M25.50 MULTIPLE JOINT PAIN: ICD-10-CM

## 2022-06-29 PROCEDURE — 3008F BODY MASS INDEX DOCD: CPT | Performed by: INTERNAL MEDICINE

## 2022-06-29 PROCEDURE — 99215 OFFICE O/P EST HI 40 MIN: CPT | Performed by: INTERNAL MEDICINE

## 2022-06-29 RX ORDER — PREDNISONE 1 MG/1
5 TABLET ORAL DAILY
Qty: 30 TABLET | Refills: 1 | Status: SHIPPED | OUTPATIENT
Start: 2022-06-29

## 2022-06-29 NOTE — PATIENT INSTRUCTIONS
You were seen today for joint pain due to rheumatoid arthritis   To taper the prednisone, take 5 mg daily for 3 weeks then go down to 2.5 mg daily for 2 weeks and then stop  Follow-up in the next 6 to 7 weeks while you are off her prednisone  Blood work today

## 2022-06-30 ENCOUNTER — PATIENT MESSAGE (OUTPATIENT)
Dept: FAMILY MEDICINE CLINIC | Facility: CLINIC | Age: 62
End: 2022-06-30

## 2022-06-30 DIAGNOSIS — M25.50 MULTIPLE JOINT PAIN: ICD-10-CM

## 2022-06-30 RX ORDER — CHLORTHALIDONE 25 MG/1
25 TABLET ORAL DAILY
Qty: 90 TABLET | Refills: 0 | Status: SHIPPED | OUTPATIENT
Start: 2022-06-30

## 2022-06-30 RX ORDER — PREGABALIN 75 MG/1
CAPSULE ORAL
Qty: 60 CAPSULE | Refills: 0 | Status: SHIPPED | OUTPATIENT
Start: 2022-06-30 | End: 2022-07-01 | Stop reason: HOSPADM

## 2022-07-01 ENCOUNTER — PATIENT MESSAGE (OUTPATIENT)
Dept: FAMILY MEDICINE CLINIC | Facility: CLINIC | Age: 62
End: 2022-07-01

## 2022-07-01 RX ORDER — PREGABALIN 75 MG/1
75 CAPSULE ORAL 2 TIMES DAILY
Qty: 60 CAPSULE | Refills: 0 | Status: SHIPPED | OUTPATIENT
Start: 2022-07-01

## 2022-07-01 NOTE — TELEPHONE ENCOUNTER
Dr Barnett=see previous [employer] message  6/30/22  and current message below, still waiting  for any erectile dysfunction medication that you could prescribe(not on this medication before. ). Thanks. Christie Aparicio RN 7/1/2022  1:02 PM CDT        ----- Message -----  From: Neelima Savage  Sent: 7/1/2022  11:03 AM CDT  To: Em Rn Triage  Subject: refill of perscription                           Brenda Partida stopped taking the Melaxacam a while ago. He is down to only 1 Tylenol 650 mg in the morning and one evening version 500 mg in a 24 hour period. The prednisone and lyrica is it and he seems to be doing well. The coritsone shot the shoulder surgeon gave him almost 2 weeks ago helped him tremendously with the right shoulder pain. He is now sleeping between 4 and almost 7 hours a night. quite the improvement from 56 minutes a night. Did you also send a script for the ED med? We try, but things just don't work like they once did. Not sure if it is the combination of meds, his new found issue or what. Thank you both for the assistance and information.

## 2022-07-01 NOTE — TELEPHONE ENCOUNTER
Patient would like to use [employer]. RX pended. Sent to [place]- Dr. Lul Juares is out of office. Patient requesting medication before going out of [facility] 7/7/22.

## 2022-07-02 RX ORDER — SILDENAFIL 50 MG/1
50 TABLET, FILM COATED ORAL
Qty: 20 TABLET | Refills: 2 | Status: SHIPPED | OUTPATIENT
Start: 2022-07-02

## 2022-07-04 ENCOUNTER — PATIENT MESSAGE (OUTPATIENT)
Dept: RHEUMATOLOGY | Facility: CLINIC | Age: 62
End: 2022-07-04

## 2022-07-05 NOTE — TELEPHONE ENCOUNTER
[facility]  To: Merlinda Fontana, MD  Sent: 7/4/2022 7:42 AM CDT  Subject: Marco Hudson     Dr. Vic Whitfield, we give, it is quite obvious now that Marco Hudson has RA. Without a doubt we were in disbelief that he could go from a once healthy person to what he is becoming. We leave for vacation, what looks to be our last trip to the beach on Thursday. Marco Hudson is hurting. We had discussed Marco Hudson winding down the Prednisone when we returned, then seeing you. Does he have to be off the Prednisone to start another possibly more helpful medication? If so, how long does he have to be off the Prednisone? We discussed Methotrexate as well as the Sulfasalzine. Marco Hudson is extremely fatigued. We understand that is a symptom of the disease. But the fatigue seems to be setting in a little stronger. He is resting a lot, but for someone who was always active it feels like he is on his back more than not. He does experience the stiffness in the mornings and could deal with that, but the stiffness seems to be setting in a little harder and longer. What can we do so that he can be more comfortable, and hopefully   a little more functional? Is there something you can prescribe that we can start while at the beach? He understands he will not be able to drink, but for a special occasion, but can you prescribe something that will allow him to be outside and do what he loves, to fish? Or can he up the Prednisone from a 1/2 to whole or more? Will that provide him with more relief? Thank you.

## 2022-07-06 LAB
ALBUMIN/GLOBULIN RATIO: 1 (CALC) (ref 1–2.5)
ALBUMIN: 3.8 G/DL (ref 3.6–5.1)
ALKALINE PHOSPHATASE: 58 U/L (ref 35–144)
ALT: 21 U/L (ref 9–46)
AST: 14 U/L (ref 10–35)
BILIRUBIN, TOTAL: 0.5 MG/DL (ref 0.2–1.2)
BUN/CREATININE RATIO: 39 (CALC) (ref 6–22)
BUN: 34 MG/DL (ref 7–25)
C-REACTIVE PROTEIN: 120.2 MG/L
CALCIUM: 9.1 MG/DL (ref 8.6–10.3)
CARBON DIOXIDE: 25 MMOL/L (ref 20–32)
CHLORIDE: 99 MMOL/L (ref 98–110)
CREATININE: 0.87 MG/DL (ref 0.7–1.25)
EGFR IF AFRICN AM: 106 ML/MIN/1.73M2
EGFR IF NONAFRICN AM: 91 ML/MIN/1.73M2
GLOBULIN: 3.7 G/DL (CALC) (ref 1.9–3.7)
GLUCOSE: 103 MG/DL (ref 65–99)
POTASSIUM: 4.6 MMOL/L (ref 3.5–5.3)
PROTEIN, TOTAL: 7.5 G/DL (ref 6.1–8.1)
SED RATE BY MODIFIED$WESTERGREN: 116 MM/H
SODIUM: 135 MMOL/L (ref 135–146)

## 2022-07-08 ENCOUNTER — PATIENT MESSAGE (OUTPATIENT)
Dept: RHEUMATOLOGY | Facility: CLINIC | Age: 62
End: 2022-07-08

## 2022-07-08 DIAGNOSIS — D50.9 IRON DEFICIENCY ANEMIA, UNSPECIFIED IRON DEFICIENCY ANEMIA TYPE: ICD-10-CM

## 2022-07-08 NOTE — TELEPHONE ENCOUNTER
From: Hernan Savage  Sent: 7/8/2022 6:49 AM CDT  To: Perla Hendrix Clinical Staff  Subject: Reji Hurd     The pharmacy is, [facility], [facility] [address], [place], [facility]. Phone is     Also, Reji Hurd is running low on his folic acid. Dr. Zan Snellen prescribed. They are 1mg tablets. Can I impose on you to also prescribe?     Thank you

## 2022-07-11 RX ORDER — FOLIC ACID 1 MG/1
1 TABLET ORAL DAILY
Qty: 30 TABLET | Refills: 3 | Status: SHIPPED | OUTPATIENT
Start: 2022-07-11

## 2022-07-13 ENCOUNTER — PATIENT MESSAGE (OUTPATIENT)
Dept: RHEUMATOLOGY | Facility: CLINIC | Age: 62
End: 2022-07-13

## 2022-07-13 RX ORDER — PREDNISONE 10 MG/1
10 TABLET ORAL DAILY
Qty: 60 TABLET | Refills: 0 | Status: SHIPPED | OUTPATIENT
Start: 2022-07-13

## 2022-07-13 NOTE — TELEPHONE ENCOUNTER
From: Radha Valdivia Mauroyanely  Sent: 7/13/2022 8:09 AM CDT  To: Perla Hendrix Clinical Staff  Subject: Javed Cardoso     Morning, I called the pharmacy in [facility] and they have the folic acid filled for Javed Cardoso, but they didn't get a prescription/order to fill the prednisone. .. 10 mg tablets. We will run out of what he currently has by this weekend. Can you please ask Dr. Shashi Weller to call in the prednisone for Javed Cardoso, enough to cover us until we return at the end of the month? I don't want him to suffer if he doesn't have it. [address]  [phone]  [address], [address] [place]    Thank you.

## 2022-08-01 ENCOUNTER — PATIENT MESSAGE (OUTPATIENT)
Dept: FAMILY MEDICINE CLINIC | Facility: CLINIC | Age: 62
End: 2022-08-01

## 2022-08-01 DIAGNOSIS — M17.11 ARTHRITIS OF RIGHT KNEE: Primary | ICD-10-CM

## 2022-08-01 DIAGNOSIS — M17.12 ARTHRITIS OF LEFT KNEE: ICD-10-CM

## 2022-08-01 NOTE — TELEPHONE ENCOUNTER
DR Barnett=see message,pended external referral,thanks       From: Ronny Savage  To: Lore Quinteros. Mayra Sebastian MD  Sent: 8/1/2022 11:15 AM CDT  Subject: RA referral - Dr. Meghan Singleton) Maksim Perez has a childhood friend that we recently spent some time with. Iris Payne was the  for the [facility] out of [facility] until his recent detention. When we shared with him what is going on with Le Aguirre, he reached out to Dr. Gulshan Dexter, a leading RA specialist here in [facility]. Dr. Gulshan Dexter has recommended Dr. Kinjal Barlow at [address] [place] [address], Rheumatology Division for Le Aguirre to obtain a second opinion. According to Dr. Gulshan Dexter we need you to make the referral before we can make contact. [phone]. We are not familiar with the referral process. Do you call Dr. Maksim Johnson and provide the referral and then let us know so we may then call and make an appointment? Or is there a different process? Le Aguirre is hanging in there but we need to get his RA treatment underway. I believe the Prednisone has done all that it can for him and he needs more. Thank you.

## 2022-08-02 ENCOUNTER — PATIENT MESSAGE (OUTPATIENT)
Dept: FAMILY MEDICINE CLINIC | Facility: CLINIC | Age: 62
End: 2022-08-02

## 2022-08-02 DIAGNOSIS — M25.50 MULTIPLE JOINT PAIN: ICD-10-CM

## 2022-08-02 NOTE — TELEPHONE ENCOUNTER
Paras Beltran RN 8/2/2022 12:07 PM CDT        ----- Message -----  From: Duong Savage  Sent: 8/2/2022 7:56 AM CDT  To: Em Rn Triage  Subject: RA referral - Dr. Colby Shukla     Thank you so much.

## 2022-08-03 RX ORDER — PREGABALIN 75 MG/1
CAPSULE ORAL
Qty: 60 CAPSULE | Refills: 0 | Status: SHIPPED | OUTPATIENT
Start: 2022-08-03

## 2022-08-05 ENCOUNTER — PATIENT MESSAGE (OUTPATIENT)
Dept: RHEUMATOLOGY | Facility: CLINIC | Age: 62
End: 2022-08-05

## 2022-08-05 NOTE — TELEPHONE ENCOUNTER
Pt scheduled for Monday.       Future Appointments   Date Time Provider Red Cochran   8/8/2022  8:20 AM Trish Chun MD 2014 [facility]   8/17/2022 12:00 PM Trish Chun MD 2014 [facility]   8/24/2022 12:00 PM Joe Bauer MD [facility] HEM ONC EMO   8/25/2022 11:00 AM MAGDALENA, TRINA ECWMOGIPROC None

## 2022-08-05 NOTE — TELEPHONE ENCOUNTER
From: Lluvia Savage  Sent: 8/5/2022 8:54 AM CDT  To: Perla Hendrix Clinical Staff  Subject: Paia Sons not doing well    2 weeks is a long time yet before we see you. We have been discussing his situation because that is all that is front and center and all consuming these days. We want to be as aggressive as possible. Reading the information you provided an other material, these NSAIDs take time to work. When we last saw you we discussed the variety of meds and at that time you were thinking methotrexate, but then we discussed sulfasalazine. We are now thinking the sulfasalazine may not be our best option, and given his deterioration (at least as we see him) is the methotrexate strong enough. Reading through the leflunomide, the side effects may be a bit much. Or do we just start with the biologics? Again, we want to be as aggressive as possible but without giving him horrible side effects that he may not be able to deal with since his mobility is very limited and extremely slow. Running to the bathroom for severe diarrhea may only cause things to be more challenging for him. Is it possible to start him on the NSAIDs regimen before we see you on the 17th to jump start things? Or can we get in early next week? Our understanding from what we have researched is that getting to the right medication or combination of medications takes time, and each of these meds has somewhere between a 6 and 12 week timeline before they may provide relief, if it is is the right med or combination of meds. René's illness appears to be quite aggressive. We would be happy to have a call if you like. Thank you.

## 2022-08-08 ENCOUNTER — TELEPHONE (OUTPATIENT)
Dept: RHEUMATOLOGY | Facility: CLINIC | Age: 62
End: 2022-08-08

## 2022-08-08 ENCOUNTER — OFFICE VISIT (OUTPATIENT)
Dept: RHEUMATOLOGY | Facility: CLINIC | Age: 62
End: 2022-08-08
Payer: COMMERCIAL

## 2022-08-08 VITALS — HEART RATE: 89 BPM | DIASTOLIC BLOOD PRESSURE: 78 MMHG | SYSTOLIC BLOOD PRESSURE: 131 MMHG

## 2022-08-08 DIAGNOSIS — M06.09 RHEUMATOID ARTHRITIS OF MULTIPLE SITES WITH NEGATIVE RHEUMATOID FACTOR (HCC): Primary | ICD-10-CM

## 2022-08-08 PROCEDURE — 3075F SYST BP GE 130 - 139MM HG: CPT | Performed by: INTERNAL MEDICINE

## 2022-08-08 PROCEDURE — 3078F DIAST BP <80 MM HG: CPT | Performed by: INTERNAL MEDICINE

## 2022-08-08 PROCEDURE — 99215 OFFICE O/P EST HI 40 MIN: CPT | Performed by: INTERNAL MEDICINE

## 2022-08-08 RX ORDER — PREDNISONE 10 MG/1
TABLET ORAL
Qty: 90 TABLET | Refills: 0 | Status: SHIPPED | OUTPATIENT
Start: 2022-08-08

## 2022-08-08 RX ORDER — METHOTREXATE 2.5 MG/1
TABLET ORAL
Qty: 72 TABLET | Refills: 0 | Status: SHIPPED | OUTPATIENT
Start: 2022-08-08

## 2022-08-08 NOTE — PATIENT INSTRUCTIONS
You were seen today for RA  Increase prednisone 30 mg daily for 2 weeks then 20 mg daily for 2 weeks then stay on 10 mg daily  Start methotrexate 4 pills weekly for 2 weeks and then increase to 6 pills weekly for 2 weeks then increase to 8 pills   Plan to start humira, will need to get approved  Blood work in 6 weeks   Will re-evaluate end of September

## 2022-08-10 ENCOUNTER — TELEPHONE (OUTPATIENT)
Dept: HEMATOLOGY/ONCOLOGY | Facility: HOSPITAL | Age: 62
End: 2022-08-10

## 2022-08-10 DIAGNOSIS — D64.9 NORMOCYTIC ANEMIA: Primary | ICD-10-CM

## 2022-08-10 RX ORDER — ADALIMUMAB 40MG/0.4ML
40 KIT SUBCUTANEOUS
Qty: 2 EACH | Refills: 5 | Status: SHIPPED | OUTPATIENT
Start: 2022-08-10 | End: 2022-09-07

## 2022-08-10 NOTE — TELEPHONE ENCOUNTER
Fax received from [employer] approved for Humira 40mg  08/09/2022 - 08/09/2023  PA # [phone]    Left message to call back.

## 2022-08-10 NOTE — TELEPHONE ENCOUNTER
Returned phone call and notified orders now in system to be drawn at [address] and should have a few days done before Dr. Vlad Mathew appointment.

## 2022-08-10 NOTE — TELEPHONE ENCOUNTER
Srinath Dial is requesting an order for labs which will be drawn at [address] [place] [address]. How far before his appointment do he need his lab, he will be going in the week of 9/19/22. please advise.

## 2022-08-10 NOTE — TELEPHONE ENCOUNTER
Returned patient wife's call she is on [place]. She verified patient information and was provided information on Humira approval and pharmacy. She was helped to schedule patient teaching.      Future Appointments   Date Time Provider Red Sammie   8/11/2022 10:00 AM SIOBHAN JOSEPH 2ND FLR RN [facility] [place]   8/17/2022 12:00 PM July Aguila MD 2014 [facility]   9/20/2022 10:40 AM July Aguila MD 2014 [facility]

## 2022-08-11 ENCOUNTER — NURSE ONLY (OUTPATIENT)
Dept: RHEUMATOLOGY | Facility: CLINIC | Age: 62
End: 2022-08-11
Payer: COMMERCIAL

## 2022-08-11 DIAGNOSIS — M06.09 RHEUMATOID ARTHRITIS OF MULTIPLE SITES WITHOUT RHEUMATOID FACTOR (HCC): Primary | ICD-10-CM

## 2022-08-11 PROCEDURE — 99211 OFF/OP EST MAY X REQ PHY/QHP: CPT | Performed by: INTERNAL MEDICINE

## 2022-08-11 RX ORDER — ADALIMUMAB 40MG/0.4ML
40 KIT SUBCUTANEOUS ONCE
Qty: 1 EACH | Refills: 0 | COMMUNITY
Start: 2022-08-11 | End: 2022-08-11

## 2022-08-11 NOTE — PROGRESS NOTES
Patient/spouse came in for Humira teaching. Name and  verified. Patient/spouse educated on proper handling/storage/disposal of medications. Patient/spouse informed of proper injection and aseptic technique. Patient/spouse educated on potential side effects. Educational materials given to patient on medication. Patient/spouse practiced several times with demo pen with great technique. Spouse then injected patient with Humira pen in to right thigh with excellent technique under RN supervision. Patient tolerated medication well. Patient/spouse questions and concerns answered. Patient/spouse instructed to call us with any further questions. Patient given Humira starter dav, was instructed to call and activate copay card. 30 min spent in education session. Patient/spouse aware to complete monitoring labs in 6-8 weeks and then follow up with provider. [facility]#[id number]  was sample provided to patient in office.

## 2022-08-22 ENCOUNTER — LAB ENCOUNTER (OUTPATIENT)
Dept: LAB | Age: 62
End: 2022-08-22
Attending: INTERNAL MEDICINE
Payer: COMMERCIAL

## 2022-08-22 DIAGNOSIS — Z01.818 PRE-OP TESTING: ICD-10-CM

## 2022-08-22 LAB — SARS-COV-2 RNA RESP QL NAA+PROBE: NOT DETECTED

## 2022-08-24 ENCOUNTER — APPOINTMENT (OUTPATIENT)
Dept: HEMATOLOGY/ONCOLOGY | Facility: HOSPITAL | Age: 62
End: 2022-08-24
Attending: INTERNAL MEDICINE
Payer: COMMERCIAL

## 2022-08-25 ENCOUNTER — ANESTHESIA EVENT (OUTPATIENT)
Dept: ENDOSCOPY | Age: 62
End: 2022-08-25
Payer: COMMERCIAL

## 2022-08-25 ENCOUNTER — ANESTHESIA (OUTPATIENT)
Dept: ENDOSCOPY | Age: 62
End: 2022-08-25
Payer: COMMERCIAL

## 2022-08-25 ENCOUNTER — HOSPITAL ENCOUNTER (OUTPATIENT)
Age: 62
Setting detail: HOSPITAL OUTPATIENT SURGERY
Discharge: HOME | End: 2022-08-25
Attending: INTERNAL MEDICINE | Admitting: INTERNAL MEDICINE
Payer: COMMERCIAL

## 2022-08-25 VITALS
BODY MASS INDEX: 34.07 KG/M2 | HEIGHT: 70 IN | RESPIRATION RATE: 10 BRPM | SYSTOLIC BLOOD PRESSURE: 109 MMHG | WEIGHT: 238 LBS | DIASTOLIC BLOOD PRESSURE: 74 MMHG | OXYGEN SATURATION: 98 % | HEART RATE: 68 BPM

## 2022-08-25 DIAGNOSIS — D64.9 ANEMIA, UNSPECIFIED TYPE: ICD-10-CM

## 2022-08-25 DIAGNOSIS — Z01.818 PRE-OP TESTING: Primary | ICD-10-CM

## 2022-08-25 DIAGNOSIS — Z12.11 SCREEN FOR COLON CANCER: ICD-10-CM

## 2022-08-25 LAB — GLUCOSE BLDC GLUCOMTR-MCNC: 100 MG/DL (ref 70–99)

## 2022-08-25 PROCEDURE — 82962 GLUCOSE BLOOD TEST: CPT

## 2022-08-25 PROCEDURE — 88312 SPECIAL STAINS GROUP 1: CPT | Performed by: INTERNAL MEDICINE

## 2022-08-25 PROCEDURE — 88305 TISSUE EXAM BY PATHOLOGIST: CPT | Performed by: INTERNAL MEDICINE

## 2022-08-25 RX ORDER — LIDOCAINE HYDROCHLORIDE 10 MG/ML
INJECTION, SOLUTION EPIDURAL; INFILTRATION; INTRACAUDAL; PERINEURAL AS NEEDED
Status: DISCONTINUED | OUTPATIENT
Start: 2022-08-25 | End: 2022-08-25 | Stop reason: SURG

## 2022-08-25 RX ORDER — SODIUM CHLORIDE, SODIUM LACTATE, POTASSIUM CHLORIDE, CALCIUM CHLORIDE 600; 310; 30; 20 MG/100ML; MG/100ML; MG/100ML; MG/100ML
INJECTION, SOLUTION INTRAVENOUS CONTINUOUS
Status: DISCONTINUED | OUTPATIENT
Start: 2022-08-25 | End: 2022-08-25

## 2022-08-25 RX ORDER — SODIUM CHLORIDE, SODIUM LACTATE, POTASSIUM CHLORIDE, CALCIUM CHLORIDE 600; 310; 30; 20 MG/100ML; MG/100ML; MG/100ML; MG/100ML
INJECTION, SOLUTION INTRAVENOUS CONTINUOUS
OUTPATIENT
Start: 2022-08-25

## 2022-08-25 RX ORDER — NALOXONE HYDROCHLORIDE 0.4 MG/ML
80 INJECTION, SOLUTION INTRAMUSCULAR; INTRAVENOUS; SUBCUTANEOUS AS NEEDED
OUTPATIENT
Start: 2022-08-25 | End: 2022-08-25

## 2022-08-25 RX ADMIN — SODIUM CHLORIDE, SODIUM LACTATE, POTASSIUM CHLORIDE, CALCIUM CHLORIDE: 600; 310; 30; 20 INJECTION, SOLUTION INTRAVENOUS at 10:45:00

## 2022-08-25 RX ADMIN — LIDOCAINE HYDROCHLORIDE 50 MG: 10 INJECTION, SOLUTION EPIDURAL; INFILTRATION; INTRACAUDAL; PERINEURAL at 10:46:00

## 2022-08-25 RX ADMIN — SODIUM CHLORIDE, SODIUM LACTATE, POTASSIUM CHLORIDE, CALCIUM CHLORIDE: 600; 310; 30; 20 INJECTION, SOLUTION INTRAVENOUS at 11:18:00

## 2022-08-25 NOTE — ANESTHESIA POSTPROCEDURE EVALUATION
Patient: Najma Stokes    Procedure Summary     Date: 08/25/22 Room / Location: [facility] ENDOSCOPY 01 / [facility] ENDO    Anesthesia Start: 5265 Anesthesia Stop: 9709    Procedures:       COLONOSCOPY/ ESOPHAGOGASTRODUODENOSCOPY (EGD) (N/A )      ESOPHAGOGASTRODUODENOSCOPY (EGD) (N/A ) Diagnosis:       Screen for colon cancer      Anemia, unspecified type      (gastric erythema, r/o mena's esophagus, polyp, diverticulosis, hemorrhoids)    Surgeons: Gagandeep Fuentes MD Anesthesiologist:     Anesthesia Type: MAC ASA Status: 2          Anesthesia Type: MAC    Vitals Value Taken Time   /74 08/25/22 1118   Temp  08/25/22 1120   Pulse 74 08/25/22 1118   Resp 16 08/25/22 1118   SpO2 98 % 08/25/22 1118       [facility] AN Post Evaluation:   Patient Evaluated in PACU  Patient Participation: complete - patient participated  Level of Consciousness: awake  Pain Score: 0  Pain Management: adequate  Airway Patency:patent  Dental exam unchanged from preop  Yes    Cardiovascular Status: acceptable  Respiratory Status: acceptable  Postoperative Hydration acceptable      Melba Liriano MD  8/25/2022 11:20 AM

## 2022-09-08 ENCOUNTER — TELEPHONE (OUTPATIENT)
Dept: RHEUMATOLOGY | Facility: CLINIC | Age: 62
End: 2022-09-08

## 2022-09-08 RX ORDER — PREDNISONE 10 MG/1
TABLET ORAL
Qty: 90 TABLET | Refills: 0 | Status: SHIPPED | OUTPATIENT
Start: 2022-09-08 | End: 2023-01-17

## 2022-09-08 NOTE — TELEPHONE ENCOUNTER
LOV: 8/8/22  Future Appointments   Date Time Provider Red Cochran   9/20/2022 10:40 AM Lele Yoon MD 2014 [facility]   9/29/2022  2:00 PM Janak Sutton  [facility] HEM ONC EMO    LABS:  Component      Latest Ref Rng & Units 7/5/2022   Glucose      65 - 99 mg/dL 103 (H)   BUN      7 - 25 mg/dL 34 (H)   CREATININE      0.70 - 1.25 mg/dL 0.87   eGFR NON-AFR.  AMERICAN      > OR = 60 mL/min/1.73m2 91   eGFR AFRICAN AMERICAN      > OR = 60 mL/min/1.73m2 106   BUN/CREATININE RATIO      6 - 22 (calc) 39 (H)   Sodium      135 - 146 mmol/L 135   Potassium      3.5 - 5.3 mmol/L 4.6   Chloride      98 - 110 mmol/L 99   Carbon Dioxide, Total      20 - 32 mmol/L 25   CALCIUM      8.6 - 10.3 mg/dL 9.1   PROTEIN, TOTAL      6.1 - 8.1 g/dL 7.5   Albumin      3.6 - 5.1 g/dL 3.8   Globulin      1.9 - 3.7 g/dL (calc) 3.7   A/G Ratio      1.0 - 2.5 (calc) 1.0   Total Bilirubin      0.2 - 1.2 mg/dL 0.5   Alkaline Phosphatase      35 - 144 U/L 58   AST (SGOT)      10 - 35 U/L 14   ALT (SGPT)      9 - 46 U/L 21   C-REACTIVE PROTEIN      <8.0 mg/L 120.2 (H)   SED RATE BY MODIFIED$WESTERGREN      < OR = 20 mm/h 116 (H)     You were seen today for RA  Increase prednisone 30 mg daily for 2 weeks then 20 mg daily for 2 weeks then stay on 10 mg daily  Start methotrexate 4 pills weekly for 2 weeks and then increase to 6 pills weekly for 2 weeks then increase to 8 pills   Plan to start humira, will need to get approved  Blood work in 6 weeks   Will re-evaluate end of September

## 2022-09-15 ENCOUNTER — TELEPHONE (OUTPATIENT)
Dept: GASTROENTEROLOGY | Facility: CLINIC | Age: 62
End: 2022-09-15

## 2022-09-15 NOTE — TELEPHONE ENCOUNTER
----- Message from Ulises Matos MD sent at 9/15/2022  2:43 PM CDT -----  Repeat EGD in 3 years  GI staff: please place recall for colonoscopy in 7 years

## 2022-09-15 NOTE — TELEPHONE ENCOUNTER
Health maintenance updated. Last colonoscopy done 8/25/22. 7 year recall placed into Pt Outreach, next due on 8/25/29 per Dr. Deven Hernandez. I personally performed the service described in the documentation recorded by the scribe in my presence, and it accurately and completely records my words and actions.

## 2022-09-20 ENCOUNTER — OFFICE VISIT (OUTPATIENT)
Dept: RHEUMATOLOGY | Facility: CLINIC | Age: 62
End: 2022-09-20

## 2022-09-20 VITALS
BODY MASS INDEX: 34.17 KG/M2 | DIASTOLIC BLOOD PRESSURE: 76 MMHG | WEIGHT: 238.69 LBS | SYSTOLIC BLOOD PRESSURE: 128 MMHG | HEIGHT: 70 IN | HEART RATE: 85 BPM

## 2022-09-20 DIAGNOSIS — M06.09 RHEUMATOID ARTHRITIS OF MULTIPLE SITES WITHOUT RHEUMATOID FACTOR (HCC): Primary | ICD-10-CM

## 2022-09-20 DIAGNOSIS — D50.9 IRON DEFICIENCY ANEMIA, UNSPECIFIED IRON DEFICIENCY ANEMIA TYPE: ICD-10-CM

## 2022-09-20 DIAGNOSIS — Z51.81 MEDICATION MONITORING ENCOUNTER: ICD-10-CM

## 2022-09-20 PROCEDURE — 3008F BODY MASS INDEX DOCD: CPT | Performed by: INTERNAL MEDICINE

## 2022-09-20 PROCEDURE — 3078F DIAST BP <80 MM HG: CPT | Performed by: INTERNAL MEDICINE

## 2022-09-20 PROCEDURE — 3074F SYST BP LT 130 MM HG: CPT | Performed by: INTERNAL MEDICINE

## 2022-09-20 PROCEDURE — 99214 OFFICE O/P EST MOD 30 MIN: CPT | Performed by: INTERNAL MEDICINE

## 2022-09-20 RX ORDER — METHOTREXATE 2.5 MG/1
20 TABLET ORAL
Qty: 104 TABLET | Refills: 0 | Status: SHIPPED | OUTPATIENT
Start: 2022-09-20

## 2022-09-20 RX ORDER — PREDNISONE 1 MG/1
TABLET ORAL
Qty: 60 TABLET | Refills: 0 | Status: SHIPPED | OUTPATIENT
Start: 2022-09-20

## 2022-09-20 RX ORDER — FOLIC ACID 1 MG/1
1 TABLET ORAL DAILY
Qty: 90 TABLET | Refills: 3 | Status: SHIPPED | OUTPATIENT
Start: 2022-09-20

## 2022-09-20 NOTE — PATIENT INSTRUCTIONS
You were seen today for RA  Continue methototrexate 8 pills weekly and folic acid daily  Continue humira every 2 weeks  Blood work tomorrow  Taper prednisone 7.5 mg daily for 2 weeks then 5 mg daily for 2 weeks then 2.5 mg daily for 2 weeks then 2.5 mg every other day for 2 weeks then stop  See me in 3 mos and get blood work then too

## 2022-09-22 LAB
% SATURATION: 35 % (CALC) (ref 20–48)
ABSOLUTE BASOPHILS: 63 CELLS/UL (ref 0–200)
ABSOLUTE EOSINOPHILS: 217 CELLS/UL (ref 15–500)
ABSOLUTE LYMPHOCYTES: 2632 CELLS/UL (ref 850–3900)
ABSOLUTE MONOCYTES: 672 CELLS/UL (ref 200–950)
ABSOLUTE NEUTROPHILS: 3416 CELLS/UL (ref 1500–7800)
ALBUMIN: 4.1 G/DL (ref 3.6–5.1)
ALT: 35 U/L (ref 9–46)
AST: 19 U/L (ref 10–35)
BASOPHILS: 0.9 %
C-REACTIVE PROTEIN: 17.3 MG/L
CREATININE: 1.04 MG/DL (ref 0.7–1.35)
EGFR: 80 ML/MIN/1.73M2
EOSINOPHILS: 3.1 %
FERRITIN: 225 NG/ML (ref 24–380)
HEMATOCRIT: 38.9 % (ref 38.5–50)
HEMOGLOBIN: 12.6 G/DL (ref 13.2–17.1)
IRON BINDING CAPACITY: 278 MCG/DL (CALC) (ref 250–425)
IRON, TOTAL: 97 MCG/DL (ref 50–180)
LYMPHOCYTES: 37.6 %
MCH: 29.5 PG (ref 27–33)
MCHC: 32.4 G/DL (ref 32–36)
MCV: 91.1 FL (ref 80–100)
MONOCYTES: 9.6 %
MPV: 8.4 FL (ref 7.5–12.5)
NEUTROPHILS: 48.8 %
PLATELET COUNT: 526 THOUSAND/UL (ref 140–400)
RDW: 18.4 % (ref 11–15)
RED BLOOD CELL COUNT: 4.27 MILLION/UL (ref 4.2–5.8)
SED RATE BY MODIFIED$WESTERGREN: 31 MM/H
WHITE BLOOD CELL COUNT: 7 THOUSAND/UL (ref 3.8–10.8)

## 2022-09-29 ENCOUNTER — OFFICE VISIT (OUTPATIENT)
Dept: HEMATOLOGY/ONCOLOGY | Facility: HOSPITAL | Age: 62
End: 2022-09-29
Attending: INTERNAL MEDICINE
Payer: COMMERCIAL

## 2022-09-29 VITALS
SYSTOLIC BLOOD PRESSURE: 125 MMHG | TEMPERATURE: 98 F | HEART RATE: 80 BPM | DIASTOLIC BLOOD PRESSURE: 70 MMHG | WEIGHT: 238 LBS | BODY MASS INDEX: 34.07 KG/M2 | RESPIRATION RATE: 18 BRPM | HEIGHT: 70 IN | OXYGEN SATURATION: 95 %

## 2022-09-29 DIAGNOSIS — D50.9 IRON DEFICIENCY ANEMIA, UNSPECIFIED IRON DEFICIENCY ANEMIA TYPE: ICD-10-CM

## 2022-09-29 DIAGNOSIS — D64.9 NORMOCYTIC ANEMIA: Primary | ICD-10-CM

## 2022-09-29 PROCEDURE — 99213 OFFICE O/P EST LOW 20 MIN: CPT | Performed by: INTERNAL MEDICINE

## 2022-10-10 ENCOUNTER — PATIENT MESSAGE (OUTPATIENT)
Dept: RHEUMATOLOGY | Facility: CLINIC | Age: 62
End: 2022-10-10

## 2022-10-10 NOTE — TELEPHONE ENCOUNTER
Per patient request a fax was sent today with pt LOV progress notes with Dr. Nicci Curtis, lab results, and xray reports. Confirmation receive.

## 2022-10-10 NOTE — TELEPHONE ENCOUNTER
From: Grover Savage  To: Torsten Wells MD  Sent: 10/10/2022 9:32 AM CDT  Subject: Follow up with Dr. Noe Church appointment is October 26th at 8:00 am. Dr. Geovany Batres office is requesting you fax to [phone] test results, notes, therapy prescribed and any other information you feel pertinent for him to know. Thank you.

## 2022-10-18 RX ORDER — PREDNISONE 1 MG/1
5 TABLET ORAL DAILY
Qty: 30 TABLET | Refills: 1 | OUTPATIENT
Start: 2022-10-18

## 2022-12-14 DIAGNOSIS — I10 ESSENTIAL HYPERTENSION: ICD-10-CM

## 2022-12-14 RX ORDER — LISINOPRIL 10 MG/1
TABLET ORAL
Qty: 45 TABLET | Refills: 1 | Status: SHIPPED | OUTPATIENT
Start: 2022-12-14

## 2022-12-20 ENCOUNTER — LAB ENCOUNTER (OUTPATIENT)
Dept: LAB | Facility: HOSPITAL | Age: 62
End: 2022-12-20
Attending: INTERNAL MEDICINE
Payer: COMMERCIAL

## 2022-12-20 ENCOUNTER — OFFICE VISIT (OUTPATIENT)
Dept: RHEUMATOLOGY | Facility: CLINIC | Age: 62
End: 2022-12-20
Payer: COMMERCIAL

## 2022-12-20 VITALS
DIASTOLIC BLOOD PRESSURE: 86 MMHG | BODY MASS INDEX: 36.08 KG/M2 | HEART RATE: 83 BPM | HEIGHT: 70 IN | WEIGHT: 252 LBS | SYSTOLIC BLOOD PRESSURE: 143 MMHG

## 2022-12-20 DIAGNOSIS — Z51.81 MEDICATION MONITORING ENCOUNTER: ICD-10-CM

## 2022-12-20 DIAGNOSIS — M06.09 RHEUMATOID ARTHRITIS OF MULTIPLE SITES WITHOUT RHEUMATOID FACTOR (HCC): Primary | ICD-10-CM

## 2022-12-20 DIAGNOSIS — M06.09 RHEUMATOID ARTHRITIS OF MULTIPLE SITES WITHOUT RHEUMATOID FACTOR (HCC): ICD-10-CM

## 2022-12-20 LAB
ALBUMIN SERPL-MCNC: 3.9 G/DL (ref 3.4–5)
ALT SERPL-CCNC: 52 U/L
AST SERPL-CCNC: 30 U/L (ref 15–37)
BASOPHILS # BLD AUTO: 0.06 X10(3) UL (ref 0–0.2)
BASOPHILS NFR BLD AUTO: 0.7 %
CREAT BLD-MCNC: 1.05 MG/DL
CRP SERPL-MCNC: 1.14 MG/DL (ref ?–0.3)
DEPRECATED RDW RBC AUTO: 58.3 FL (ref 35.1–46.3)
EOSINOPHIL # BLD AUTO: 0.28 X10(3) UL (ref 0–0.7)
EOSINOPHIL NFR BLD AUTO: 3.3 %
ERYTHROCYTE [DISTWIDTH] IN BLOOD BY AUTOMATED COUNT: 15.9 % (ref 11–15)
ERYTHROCYTE [SEDIMENTATION RATE] IN BLOOD: 54 MM/HR
GFR SERPLBLD BASED ON 1.73 SQ M-ARVRAT: 79 ML/MIN/1.73M2 (ref 60–?)
HCT VFR BLD AUTO: 41 %
HGB BLD-MCNC: 13.7 G/DL
IMM GRANULOCYTES # BLD AUTO: 0.04 X10(3) UL (ref 0–1)
IMM GRANULOCYTES NFR BLD: 0.5 %
LYMPHOCYTES # BLD AUTO: 2.13 X10(3) UL (ref 1–4)
LYMPHOCYTES NFR BLD AUTO: 24.9 %
MCH RBC QN AUTO: 33.3 PG (ref 26–34)
MCHC RBC AUTO-ENTMCNC: 33.4 G/DL (ref 31–37)
MCV RBC AUTO: 99.5 FL
MONOCYTES # BLD AUTO: 1.02 X10(3) UL (ref 0.1–1)
MONOCYTES NFR BLD AUTO: 11.9 %
NEUTROPHILS # BLD AUTO: 5.02 X10 (3) UL (ref 1.5–7.7)
NEUTROPHILS # BLD AUTO: 5.02 X10(3) UL (ref 1.5–7.7)
NEUTROPHILS NFR BLD AUTO: 58.7 %
PLATELET # BLD AUTO: 400 10(3)UL (ref 150–450)
RBC # BLD AUTO: 4.12 X10(6)UL
WBC # BLD AUTO: 8.6 X10(3) UL (ref 4–11)

## 2022-12-20 PROCEDURE — 84460 ALANINE AMINO (ALT) (SGPT): CPT

## 2022-12-20 PROCEDURE — 86140 C-REACTIVE PROTEIN: CPT

## 2022-12-20 PROCEDURE — 85025 COMPLETE CBC W/AUTO DIFF WBC: CPT

## 2022-12-20 PROCEDURE — 82565 ASSAY OF CREATININE: CPT

## 2022-12-20 PROCEDURE — 84450 TRANSFERASE (AST) (SGOT): CPT

## 2022-12-20 PROCEDURE — 36415 COLL VENOUS BLD VENIPUNCTURE: CPT

## 2022-12-20 PROCEDURE — 85652 RBC SED RATE AUTOMATED: CPT

## 2022-12-20 PROCEDURE — 3079F DIAST BP 80-89 MM HG: CPT | Performed by: INTERNAL MEDICINE

## 2022-12-20 PROCEDURE — 99214 OFFICE O/P EST MOD 30 MIN: CPT | Performed by: INTERNAL MEDICINE

## 2022-12-20 PROCEDURE — 82040 ASSAY OF SERUM ALBUMIN: CPT

## 2022-12-20 PROCEDURE — 3008F BODY MASS INDEX DOCD: CPT | Performed by: INTERNAL MEDICINE

## 2022-12-20 PROCEDURE — 3077F SYST BP >= 140 MM HG: CPT | Performed by: INTERNAL MEDICINE

## 2022-12-20 RX ORDER — CHLORTHALIDONE 25 MG/1
25 TABLET ORAL DAILY
Qty: 30 TABLET | Refills: 0 | Status: SHIPPED | OUTPATIENT
Start: 2022-12-20

## 2022-12-20 RX ORDER — METHOTREXATE 2.5 MG/1
20 TABLET ORAL
Qty: 104 TABLET | Refills: 0 | Status: SHIPPED | OUTPATIENT
Start: 2022-12-20 | End: 2022-12-20

## 2022-12-20 RX ORDER — METHOTREXATE 25 MG/ML
0.8 INJECTION INTRA-ARTERIAL; INTRAMUSCULAR; INTRATHECAL; INTRAVENOUS
Qty: 9.6 ML | Refills: 0 | Status: SHIPPED | OUTPATIENT
Start: 2022-12-20 | End: 2022-12-21

## 2022-12-20 RX ORDER — NAPROXEN SODIUM 220 MG
TABLET ORAL
Qty: 4 EACH | Refills: 2 | Status: SHIPPED | OUTPATIENT
Start: 2022-12-20

## 2022-12-20 NOTE — PATIENT INSTRUCTIONS
You were seen for rheumatoid arthritis  Joints are better controlled  Continue Humira every 2 weeks  The methotrexate 20 mg weekly, will be prescribed as an injection  Folic acid every day  For the swelling/edema in the legs restart chlorthalidone, will send a message to your PCP  If the left wrist worsens come in for an injection  Blood work today  I will see him in 3 months

## 2022-12-20 NOTE — TELEPHONE ENCOUNTER
LOV: 10/28/2022  Future Appointments   Date Time Provider Red Cochran   12/20/2022  2:40 PM Galindo Newman MD 2014 [facility]     Labs:   Component      Latest Ref Rng & Units 9/21/2022   WBC      3.8 - 10.8 Thousand/uL 7.0   RBC      4.20 - 5.80 Million/uL 4.27   Hemoglobin      13.2 - 17.1 g/dL 12.6 (L)   Hematocrit      38.5 - 50.0 % 38.9   MCV      80.0 - 100.0 fL 91.1   MCH      27.0 - 33.0 pg 29.5   MCHC      32.0 - 36.0 g/dL 32.4   RDW      11.0 - 15.0 % 18.4 (H)   Platelet Count      980 - 400 Thousand/uL 526 (H)   MPV      7.5 - 12.5 fL 8.4   Neutrophils Absolute      1,500 - 7,800 cells/uL 3,416   Lymphocytes Absolute      850 - 3,900 cells/uL 2,632   Monocytes Absolute      200 - 950 cells/uL 672   Eosinophils Absolute      15 - 500 cells/uL 217   Basophils Absolute      0 - 200 cells/uL 63   Neutrophils %      % 48.8   Lymphocytes %      % 37.6   Monocytes %      % 9.6   Eosinophils %      % 3.1   Basophils %      % 0.9   IRON, TOTAL      50 - 180 mcg/dL 97   IRON BINDING CAPACITY      250 - 425 mcg/dL (calc) 278   % SATURATION      20 - 48 % (calc) 35   CREATININE      0.70 - 1.35 mg/dL 1.04   EGFR      > OR = 60 mL/min/1.73m2 80   Albumin      3.6 - 5.1 g/dL 4.1   ALT (SGPT)      9 - 46 U/L 35   AST (SGOT)      10 - 35 U/L 19   C-REACTIVE PROTEIN      <8.0 mg/L 17.3 (H)   SED RATE BY MODIFIED$WESTERGREN      < OR = 20 mm/h 31 (H)   FERRITIN      24 - 380 ng/mL 225

## 2022-12-21 ENCOUNTER — TELEPHONE (OUTPATIENT)
Dept: RHEUMATOLOGY | Facility: CLINIC | Age: 62
End: 2022-12-21

## 2022-12-21 RX ORDER — METHOTREXATE 25 MG/ML
0.8 INJECTION INTRA-ARTERIAL; INTRAMUSCULAR; INTRATHECAL; INTRAVENOUS
Qty: 9.6 ML | Refills: 0 | Status: SHIPPED | OUTPATIENT
Start: 2022-12-21 | End: 2023-03-21

## 2023-01-13 NOTE — TELEPHONE ENCOUNTER
Disp Refills Start End    Adalimumab (HUMIRA PEN) 40 MG/0.4ML Subcutaneous Pen-injector Kit 1 each 0 8/11/2022 8/11/2022      LOV: 12/20/22  Future Appointments   Date Time Provider Red Cochran   1/17/2023  9:30 AM Temitope Pleitez MD [facility] [place]   3/15/2023  3:00 PM Jose Angel Delgado MD 2014 [facility]     Labs:   Component      Latest Ref Rng & Units 12/20/2022   WBC      4.0 - 11.0 x10(3) uL 8.6   RBC      4.30 - 5.70 x10(6)uL 4.12 (L)   Hemoglobin      13.0 - 17.5 g/dL 13.7   Hematocrit      39.0 - 53.0 % 41.0   MCV      80.0 - 100.0 fL 99.5   MCH      26.0 - 34.0 pg 33.3   MCHC      31.0 - 37.0 g/dL 33.4   RDW-SD      35.1 - 46.3 fL 58.3 (H)   RDW      11.0 - 15.0 % 15.9 (H)   Platelet Count      273.5 - 450.0 10(3)uL 400.0   Prelim Neutrophil Abs      1.50 - 7.70 x10 (3) uL 5.02   Neutrophils Absolute      1.50 - 7.70 x10(3) uL 5.02   Lymphocytes Absolute      1.00 - 4.00 x10(3) uL 2.13   Monocytes Absolute      0.10 - 1.00 x10(3) uL 1.02 (H)   Eosinophils Absolute      0.00 - 0.70 x10(3) uL 0.28   Basophils Absolute      0.00 - 0.20 x10(3) uL 0.06   Immature Granulocyte Absolute      0.00 - 1.00 x10(3) uL 0.04   Neutrophils %      % 58.7   Lymphocytes %      % 24.9   Monocytes %      % 11.9   Eosinophils %      % 3.3   Basophils %      % 0.7   Immature Granulocyte %      % 0.5   CREATININE      0.70 - 1.30 mg/dL 1.05   eGFR-Cr      >=60 mL/min/1.73m2 79   SED RATE      0 - 20 mm/Hr 54 (H)   C-REACTIVE PROTEIN      <0.30 mg/dL 1.14 (H)   AST (SGOT)      15 - 37 U/L 30   ALT (SGPT)      16 - 61 U/L 52   Albumin      3.4 - 5.0 g/dL 3.9   Instructions    You were seen for rheumatoid arthritis  Joints are better controlled  Continue Humira every 2 weeks  The methotrexate 20 mg weekly, will be prescribed as an injection  Folic acid every day  For the swelling/edema in the legs restart chlorthalidone, will send a message to your PCP  If the left wrist worsens come in for an injection  Blood work today  I will see him in 3 months

## 2023-01-15 RX ORDER — ADALIMUMAB 40MG/0.4ML
40 KIT SUBCUTANEOUS
Qty: 2 EACH | Refills: 3 | Status: SHIPPED | OUTPATIENT
Start: 2023-01-15 | End: 2023-02-12

## 2023-01-17 ENCOUNTER — OFFICE VISIT (OUTPATIENT)
Dept: FAMILY MEDICINE CLINIC | Facility: CLINIC | Age: 63
End: 2023-01-17

## 2023-01-17 VITALS
HEIGHT: 70 IN | HEART RATE: 87 BPM | DIASTOLIC BLOOD PRESSURE: 80 MMHG | SYSTOLIC BLOOD PRESSURE: 138 MMHG | WEIGHT: 248 LBS | RESPIRATION RATE: 18 BRPM | BODY MASS INDEX: 35.5 KG/M2

## 2023-01-17 DIAGNOSIS — I25.10 ATHEROSCLEROSIS OF NATIVE CORONARY ARTERY OF NATIVE HEART WITHOUT ANGINA PECTORIS: ICD-10-CM

## 2023-01-17 DIAGNOSIS — J43.2 CENTRILOBULAR EMPHYSEMA (HCC): ICD-10-CM

## 2023-01-17 DIAGNOSIS — Z23 NEED FOR PNEUMOCOCCAL VACCINATION: ICD-10-CM

## 2023-01-17 DIAGNOSIS — I10 ESSENTIAL HYPERTENSION: Primary | ICD-10-CM

## 2023-01-17 RX ORDER — CHLORTHALIDONE 25 MG/1
25 TABLET ORAL DAILY
Qty: 90 TABLET | Refills: 3 | Status: SHIPPED | OUTPATIENT
Start: 2023-01-17

## 2023-01-25 LAB
ALBUMIN/GLOBULIN RATIO: 1.5 (CALC) (ref 1–2.5)
ALBUMIN: 4.3 G/DL (ref 3.6–5.1)
ALKALINE PHOSPHATASE: 72 U/L (ref 35–144)
ALT: 30 U/L (ref 9–46)
AST: 23 U/L (ref 10–35)
BILIRUBIN, TOTAL: 0.8 MG/DL (ref 0.2–1.2)
BUN/CREATININE RATIO: 24 (CALC) (ref 6–22)
BUN: 30 MG/DL (ref 7–25)
CALCIUM: 9.4 MG/DL (ref 8.6–10.3)
CARBON DIOXIDE: 29 MMOL/L (ref 20–32)
CHLORIDE: 100 MMOL/L (ref 98–110)
CHOL/HDLC RATIO: 3 (CALC)
CHOLESTEROL, TOTAL: 134 MG/DL
CREATININE: 1.25 MG/DL (ref 0.7–1.35)
EGFR: 64 ML/MIN/1.73M2
GLOBULIN: 2.9 G/DL (CALC) (ref 1.9–3.7)
GLUCOSE: 97 MG/DL (ref 65–99)
HDL CHOLESTEROL: 44 MG/DL
LDL-CHOLESTEROL: 76 MG/DL (CALC)
NON-HDL CHOLESTEROL: 90 MG/DL (CALC)
POTASSIUM: 4.6 MMOL/L (ref 3.5–5.3)
PROTEIN, TOTAL: 7.2 G/DL (ref 6.1–8.1)
SODIUM: 135 MMOL/L (ref 135–146)
TRIGLYCERIDES: 59 MG/DL

## 2023-01-26 DIAGNOSIS — I25.10 ATHEROSCLEROSIS OF NATIVE CORONARY ARTERY OF NATIVE HEART WITHOUT ANGINA PECTORIS: ICD-10-CM

## 2023-01-26 RX ORDER — ROSUVASTATIN CALCIUM 5 MG/1
5 TABLET, COATED ORAL NIGHTLY
Qty: 90 TABLET | Refills: 3 | Status: SHIPPED | OUTPATIENT
Start: 2023-01-26

## 2023-03-07 ENCOUNTER — TELEPHONE (OUTPATIENT)
Dept: RHEUMATOLOGY | Facility: CLINIC | Age: 63
End: 2023-03-07

## 2023-03-07 NOTE — TELEPHONE ENCOUNTER
Please send new script along with strength, directions, quantity and refills:    Methotrexate 2.5 mg tablet, qty: 72 each,

## 2023-03-15 ENCOUNTER — OFFICE VISIT (OUTPATIENT)
Dept: RHEUMATOLOGY | Facility: CLINIC | Age: 63
End: 2023-03-15

## 2023-03-15 VITALS
SYSTOLIC BLOOD PRESSURE: 114 MMHG | WEIGHT: 243 LBS | BODY MASS INDEX: 34.79 KG/M2 | DIASTOLIC BLOOD PRESSURE: 71 MMHG | HEART RATE: 71 BPM | HEIGHT: 70 IN

## 2023-03-15 DIAGNOSIS — Z51.81 MEDICATION MONITORING ENCOUNTER: ICD-10-CM

## 2023-03-15 DIAGNOSIS — M06.09 RHEUMATOID ARTHRITIS OF MULTIPLE SITES WITHOUT RHEUMATOID FACTOR (HCC): Primary | ICD-10-CM

## 2023-03-15 DIAGNOSIS — D50.9 IRON DEFICIENCY ANEMIA, UNSPECIFIED IRON DEFICIENCY ANEMIA TYPE: ICD-10-CM

## 2023-03-15 PROCEDURE — 3074F SYST BP LT 130 MM HG: CPT | Performed by: INTERNAL MEDICINE

## 2023-03-15 PROCEDURE — 99214 OFFICE O/P EST MOD 30 MIN: CPT | Performed by: INTERNAL MEDICINE

## 2023-03-15 PROCEDURE — 3008F BODY MASS INDEX DOCD: CPT | Performed by: INTERNAL MEDICINE

## 2023-03-15 PROCEDURE — 3078F DIAST BP <80 MM HG: CPT | Performed by: INTERNAL MEDICINE

## 2023-03-15 RX ORDER — NAPROXEN SODIUM 220 MG
TABLET ORAL
Qty: 4 EACH | Refills: 2 | Status: SHIPPED | OUTPATIENT
Start: 2023-03-15

## 2023-03-15 RX ORDER — METHOTREXATE 25 MG/ML
0.8 INJECTION INTRA-ARTERIAL; INTRAMUSCULAR; INTRATHECAL; INTRAVENOUS
Qty: 9.6 ML | Refills: 1 | Status: SHIPPED | OUTPATIENT
Start: 2023-03-15 | End: 2023-08-30

## 2023-03-15 RX ORDER — FOLIC ACID 1 MG/1
1 TABLET ORAL DAILY
Qty: 90 TABLET | Refills: 3 | Status: SHIPPED | OUTPATIENT
Start: 2023-03-15

## 2023-04-14 ENCOUNTER — PATIENT MESSAGE (OUTPATIENT)
Dept: RHEUMATOLOGY | Facility: CLINIC | Age: 63
End: 2023-04-14

## 2023-04-14 NOTE — TELEPHONE ENCOUNTER
Please see message below and above. Please advise. Pt may need to contact insurance for vacation override.

## 2023-04-18 NOTE — TELEPHONE ENCOUNTER
Agree if patient contact insurance for vacation override.  if they need any information from us we can provide it for them

## 2023-04-27 RX ORDER — ADALIMUMAB 40MG/0.4ML
40 KIT SUBCUTANEOUS
Qty: 2 EACH | Refills: 5 | Status: SHIPPED | OUTPATIENT
Start: 2023-04-27 | End: 2023-05-25

## 2023-04-27 NOTE — TELEPHONE ENCOUNTER
Pharmacy is requesting a refill for Adalimumab (HUMIRA PEN) 40 MG/0.4ML Subcutaneous Pen-injector Kit.  Please advise

## 2023-04-30 ENCOUNTER — PATIENT MESSAGE (OUTPATIENT)
Dept: FAMILY MEDICINE CLINIC | Facility: CLINIC | Age: 63
End: 2023-04-30

## 2023-04-30 DIAGNOSIS — I10 ESSENTIAL HYPERTENSION: ICD-10-CM

## 2023-04-30 DIAGNOSIS — N52.9 ERECTILE DYSFUNCTION, UNSPECIFIED ERECTILE DYSFUNCTION TYPE: Primary | ICD-10-CM

## 2023-05-01 ENCOUNTER — TELEPHONE (OUTPATIENT)
Dept: RHEUMATOLOGY | Facility: CLINIC | Age: 63
End: 2023-05-01

## 2023-05-01 ENCOUNTER — PATIENT MESSAGE (OUTPATIENT)
Dept: RHEUMATOLOGY | Facility: CLINIC | Age: 63
End: 2023-05-01

## 2023-05-01 DIAGNOSIS — M06.09 RHEUMATOID ARTHRITIS OF MULTIPLE SITES WITHOUT RHEUMATOID FACTOR (HCC): ICD-10-CM

## 2023-05-01 DIAGNOSIS — Z51.81 MEDICATION MONITORING ENCOUNTER: ICD-10-CM

## 2023-05-01 RX ORDER — NAPROXEN SODIUM 220 MG
TABLET ORAL
Qty: 4 EACH | Refills: 2 | Status: SHIPPED | OUTPATIENT
Start: 2023-05-01

## 2023-05-01 RX ORDER — SILDENAFIL 100 MG/1
100 TABLET, FILM COATED ORAL
Qty: 40 TABLET | Refills: 1 | Status: SHIPPED | OUTPATIENT
Start: 2023-05-01

## 2023-05-01 RX ORDER — FOLIC ACID 1 MG/1
1 TABLET ORAL DAILY
Qty: 90 TABLET | Refills: 3 | Status: SHIPPED | OUTPATIENT
Start: 2023-05-01

## 2023-05-01 RX ORDER — CHLORTHALIDONE 25 MG/1
25 TABLET ORAL DAILY
Qty: 90 TABLET | Refills: 3 | Status: SHIPPED | OUTPATIENT
Start: 2023-05-01

## 2023-05-01 RX ORDER — ADALIMUMAB 40MG/0.4ML
40 KIT SUBCUTANEOUS
Qty: 6 EACH | Refills: 0 | Status: SHIPPED | OUTPATIENT
Start: 2023-05-01 | End: 2023-05-29

## 2023-05-01 RX ORDER — METHOTREXATE 25 MG/ML
0.8 INJECTION INTRA-ARTERIAL; INTRAMUSCULAR; INTRATHECAL; INTRAVENOUS
Qty: 9.6 ML | Refills: 1 | Status: SHIPPED | OUTPATIENT
Start: 2023-05-01 | End: 2023-10-16

## 2023-05-01 NOTE — TELEPHONE ENCOUNTER
From: Jami Savage  To: Jennifer De La Fuente MD  Sent: 5/1/2023 3:23 PM CDT  Subject: Mail away script    We are attempting to embrace the mail away function through [facility] and insurance. I was trying to enter in all of René's meds that could qualify for the recurring mail away. I input the syringes and the methotrexate. However, they need your approval. They said they have been sending faxes for permission. They called again this morning and said you may contact them at [phone] or fax over [phone] [facility] prescriptions to [phone]. We have plenty of the syringes and methotrexate for the moment, and to get us through our vacation until we return. I was trying to be proactive. This ask is different from our previous request regarding the additional Humira pens. Thank you.

## 2023-05-01 NOTE — TELEPHONE ENCOUNTER
From: Ciera Savage  To: Esmer Mosqueda. John Pack MD  Sent: 4/30/2023 1:41 PM CDT  Subject: Chlorthalidone    Dr. Cristal Rodriguez, when you prescribed René's renewal the pharmacy would only refill for 30 days because insurance wouldn't cover the other 60 days. We are going on vacation, leaving the 18th and won't return until June 25th. I am trying to embrace the mail away script, but we seem to be having an issue with that as well. Can you please send to our pharmacy to be filled as a mail away a prescription for 90 days? Apparently they will fill that. Or could you insist they fill another 30 sooner than they feel Jenni Nugent should be receiving so that we can take his med with us. We are going to be on a remote [facility] [address] and would prefer we don't have to try and track down a pharmacy, if possible. Also, would love something a little stronger on the Viagra, the med Jenni Nugent is taking now doesn't seem to last long enough. Thank you.

## 2023-05-01 NOTE — TELEPHONE ENCOUNTER
It looks like they want methotrexate to be sent through the mail pharmacy, I sent a prescription of methotrexate, folic acid and the insulin syringes to the [facility]

## 2023-05-01 NOTE — TELEPHONE ENCOUNTER
Humira sent to [facility] pharmacy for 6 pens for vacation override pt patient request.

## 2023-05-01 NOTE — TELEPHONE ENCOUNTER
Prescriptions have been sent, please fax to [facility] and verify the patient will be able to get 90-day supplies of chlorthalidone and please then inform patient's wife.   Thank you

## 2023-05-04 ENCOUNTER — TELEPHONE (OUTPATIENT)
Dept: RHEUMATOLOGY | Facility: CLINIC | Age: 63
End: 2023-05-04

## 2023-05-04 NOTE — TELEPHONE ENCOUNTER
Brianna Torrez from [place] [address] is calling and states that she wants to report an interaction that she had yesterday. She wants to make sure that the patient is still taking these medications as they were getting flagged in their system for these meds.     Methotrexate Sodium, PF, 50 MG/2ML Injection Solution    Omeprazole 40 MG Oral Capsule Delayed Release

## 2023-07-12 ENCOUNTER — PATIENT MESSAGE (OUTPATIENT)
Dept: RHEUMATOLOGY | Facility: CLINIC | Age: 63
End: 2023-07-12

## 2023-07-12 NOTE — TELEPHONE ENCOUNTER
From: Stanley Savage  To: Ita Ireland MD  Sent: 7/12/2023 11:48 AM CDT  Subject: Upcoming visit, July 19th    Dr. Shira Davis, our insurance prefers we use [employer]. Are the blood tests that show as recurring every 3 months under test results in My Chart available for the folks at [address] [facility] to pull or do you need to send those orders? We were hoping Gloria Jeanette could get those tests done prior to his appointment. Thank you.

## 2023-07-14 NOTE — TELEPHONE ENCOUNTER
PA packet began via Fax  [place]  Humira 40mG subcutaneously every 2 weeks    3/15/2023:   Presents for follow-up of seronegative RA/spondylarthritis  Currently on methotrexate 0.8 mL injecitons weekly and Humira every 2 weeks  Was switched from oral to injection methotrexate last visit  Having some clicking and popping in ankles and hips. mostly the left hip going up to the left gluteal region  Also working out at gym 4 days a week, walks for 30 min and some weights. Some soreness in the left elbow  r shoulder is doing better, less pain.  Able to raise it now  Hands and wrist doing good, can make a fist

## 2023-07-17 NOTE — TELEPHONE ENCOUNTER
Received Fax from [address] [place] [address] they did not receive packet. Resent PA packet for Humira.

## 2023-07-19 ENCOUNTER — OFFICE VISIT (OUTPATIENT)
Dept: RHEUMATOLOGY | Facility: CLINIC | Age: 63
End: 2023-07-19

## 2023-07-19 VITALS
SYSTOLIC BLOOD PRESSURE: 129 MMHG | HEIGHT: 70 IN | WEIGHT: 235 LBS | HEART RATE: 70 BPM | DIASTOLIC BLOOD PRESSURE: 74 MMHG | BODY MASS INDEX: 33.64 KG/M2

## 2023-07-19 DIAGNOSIS — M06.09 RHEUMATOID ARTHRITIS OF MULTIPLE SITES WITHOUT RHEUMATOID FACTOR (HCC): Primary | ICD-10-CM

## 2023-07-19 DIAGNOSIS — Z51.81 MEDICATION MONITORING ENCOUNTER: ICD-10-CM

## 2023-07-19 PROCEDURE — 3008F BODY MASS INDEX DOCD: CPT | Performed by: INTERNAL MEDICINE

## 2023-07-19 PROCEDURE — 3074F SYST BP LT 130 MM HG: CPT | Performed by: INTERNAL MEDICINE

## 2023-07-19 PROCEDURE — 99214 OFFICE O/P EST MOD 30 MIN: CPT | Performed by: INTERNAL MEDICINE

## 2023-07-19 PROCEDURE — 3078F DIAST BP <80 MM HG: CPT | Performed by: INTERNAL MEDICINE

## 2023-07-19 RX ORDER — ADALIMUMAB 40MG/0.4ML
40 KIT SUBCUTANEOUS
COMMUNITY
Start: 2023-07-03 | End: 2023-07-20

## 2023-07-19 NOTE — PATIENT INSTRUCTIONS
You were seen for rheumatoid arthritis  Joints are doing really well  Continue methotrexate, folic acid and Humira  Blood work sometime this week  Can see me in the next 3 months

## 2023-07-19 NOTE — PROGRESS NOTES
Shirin Leo is a 70year old male. HPI:   No chief complaint on file. I had the pleasure of seeing Shirin Leo on 7/19/2023 for follow up Seronegative RA/Spondyloarthritis. Current medications:  Humira every 2 weeks- started 8/11/2022  Methotrexate 0.8 mL injections weekly- started 8/9/2022  Previous medications:  Prednisone- end July 2022-11/2022  Blood work:  Neg VALENCIA, RF, CCP, HLA-B27, CK  , >130    Interval History: This is a 56 yo M with hx of HTN, B/L TKR (Dec 2021 and March 2022), L hand Carpal tunnel release (April 2022) presents with polyarthralgias. About 1 year ago around June 2021 he was doing really well. Had very minimal joint pain. In June 2021 they sold their house at that time started having worsening joint pain in his knees. Mostly his left knee. X-rays of the knees showed significant medial compartment narrowing in the left knee and moderate medial compartment in the right knee. He then had his left knee replaced. Overall did pretty good. In March he had his right knee replaced and since then has had worsening joint pain overall. He fell out of bed and dislocated his right shoulder in March, he had a shoulder reduction but was seen by orthopedic and they are recommending surgery. He has chronic left shoulder pain from wear-and-tear from his job. He worked as a  for his entire career. He is also supposed to be having left shoulder surgery. He then started to have worsening pain in his hands. He had swelling in his right wrist.  Swelling in his right wrist has been since December 2021. He also had left elbow pain and swelling. He received a cortisone shot in his right wrist and left elbow in April 2022. It did help some of his symptoms. He had some numbness and tingling in his hands also. He had an EMG done around April 2022 and per PCPs note he was diagnosed with neuropathy in both upper extremities.   He then had left carpal tunnel release surgery in April 2022. The most recent joint pain has been his right wrist and left elbow. Blood work in April 2022 did show elevated , repeat in May was >130. Denies any history of psoriasis, temporal pain, headache, jaw pain or blurry vision  Reviewing his right knee synovial biopsy from March 2022, findings are suggestive of rheumatoid arthritis    6/29/20022:  Presents for f/u of Seronegative RA  Recent blood work showed ESR>130 and CRP 15.1 mg/dL  Negative RF, CCP and HLA-B27  X-ray of the right wrist does show marked joint space narrowing and 2 erosions on the lateral cortex of the scaphoid, erosive changes on the ulnar styloid  Has been on prednisone for about 2 weeks now, on prednisone 10 mg daily  Now  Joints feel 40% better. Has some mild pain in the hands and also some numbness in the L hand. R hand is doing better, mild pain in the wrist   Shoulder have chronic pain, r shoulder abduction limited to 30 degrees and left shoulder abduction limited to 90 degrees  Knees are doing better, can walk a mile   Also in PT twice a week for the knees and the shoulders  Had a cortisone shot in R shoulder last week and helped it a lot     8/8/2022:  Presents for f/u of Seronegative RA  Recent blood work showed ESR>130 and CRP 15.1 mg/dL  Negative RF, CCP and HLA-B27  X-ray of the right wrist does show marked joint space narrowing and 2 erosions on the lateral cortex of the scaphoid, erosive changes on the ulnar styloid  Remains on prednisone 10 mg daily, decreased to 5 mg but had more s/e  Having a lot of joint pain and swelling and difficulty ambulating due to his pain  Reports swelling in both hands, fingers, right wrist and right ankle.   Also having pain in both knees  Patient wife states that his symptoms have worsened significantly    9/20/2022:   Presents for follow-up of seronegative RA/spondylarthritis  He was started on Humira and methotrexate back in August.  He also remains on prednisone 10 mg daily  Joint pain has significantly improved  He is able to do his own ADLs. Can walk 5000 steps a day. Hands are better. Wife states that during his last visit he was not able to do anything for himself, not able to do his own ADLs  States that he can make a fist now. Right ankle swelling improved  Seen by orthopedic and planning on having his shoulder surgery at the end of November 12/20/2022:   Presents for follow-up of seronegative RA/spondylarthritis  Currently on methotrexate 8 pills weekly and Humira every 2 weeks  Has been off of prednisone now since November  When tapered off prednisone has some worsening joint pain for about 1 week and then joints got better. Having some pain and swelling in the ankles, R is worse than L  Ankles are more swollen, less pain. When wake up in AM no swelling in ankles but as days goes on swelling worsens. He was on a diuretic, chlorthalidone but stopped a month ago. prescription ran out and was not renew. Has been really active, walking 3 times a week. Also has been golfing and bowling. Has been getting more nauseus and has been having some headaches. 3/15/2023:   Presents for follow-up of seronegative RA/spondylarthritis  Currently on methotrexate 0.8 mL injecitons weekly and Humira every 2 weeks  Was switched from oral to injection methotrexate last visit  Having some clicking and popping in ankles and hips. mostly the left hip going up to the left gluteal region  Also working out at gym 4 days a week, walks for 30 min and some weights. Some soreness in the left elbow  r shoulder is doing better, less pain.  Able to raise it now  Hands and wrist doing good, can make a fist    7/19/2023:   Presents for follow-up of seronegative RA/spondylarthritis  Currently on methotrexate 0.8 mL injecitons weekly and Humira every 2 weeks  Has been bike riding, 17 miles and doing well  Minimal joint pain  Doing well overall                HISTORY:  Past Medical History: Diagnosis Date    High cholesterol     Osteoarthritis       Social Hx Reviewed   Family Hx Reviewed     Medications (Active prior to today's visit):  Current Outpatient Medications   Medication Sig Dispense Refill    lisinopril 10 MG Oral Tab Take 0.5 tablets (5 mg total) by mouth daily. 45 tablet 3    Methotrexate Sodium, PF, 50 MG/2ML Injection Solution Inject 0.8 mL as directed every 7 days. 9.6 mL 1    Insulin Syringe 30G X 5/16\" 1 ML Does not apply Misc Patient will inject methotrexate subcutaneously 0.8 mL every 7 days 4 each 2    folic acid 1 MG Oral Tab Take 1 tablet (1 mg total) by mouth daily. 90 tablet 3    chlorthalidone 25 MG Oral Tab Take 1 tablet (25 mg total) by mouth daily. 90 tablet 3    Sildenafil Citrate (VIAGRA) 100 MG Oral Tab Take 1 tablet (100 mg total) by mouth daily as needed for Erectile Dysfunction. 40 tablet 1    rosuvastatin 5 MG Oral Tab Take 1 tablet (5 mg total) by mouth nightly. 90 tablet 3     .cmed  Allergies:  No Known Allergies      ROS:   All other ROS are negative. PHYSICAL EXAM:   GEN: AAOx3, NAD  HEENT: EOMI, PERRLA, no injection or icterus, oral mucosa moist, no oral lesions. No lymphadenopathy. No facial rash  CVS: RRR, no murmurs rubs or gallops. Equal 2+ distal pulses. LUNGS: CTAB, no increased work of breathing  ABDOMEN:  soft NT/ND, +BS, no HSM  SKIN: No rashes or skin lesions. No nail findings  MSK:  Cervical spine: FROM  Hands: Swelling improved, has happened Nicky's nodes. Able to make a full fist  Wrist: no swelling now  Elbow: FROM, no pain or swelling or warmth on palpation  Shoulders: Bilateral shoulder abduction now limited to 100 degrees  Hip: normal log roll, no lateral hip pain, JOSE ALBERTO test negative b/l  Knees: FROM, no warmth or effusion present. No pain with ROM. Ankles: R ankle no swelling  Feet: no pain with MTP squeeze, no toe swelling or pain or warmth on palpation with FROM  Spine: no lumbar or sacral pain on palpation.   EXT: 1+edema in LE   NEURO: Cranial nerves II-XII intact grossly. 5/5 strength throughout in both upper and lower extremities, sensation intact. PSYCH: normal mood       LABS:     Component      Latest Ref Rng & Units 6/13/2022   QUANTIFERON(R)-TB GOLD PLUS, 1 TUBE      NEGATIVE NEGATIVE   NIL      IU/mL 0.02   MITOGEN-NIL      IU/mL >10.00   TB1-NIL      IU/mL 0.01   TB2-NIL      IU/mL 0.01   HCV AB      NON-REACTIVE NON-REACTIVE   SIGNAL TO CUT-OFF      <1.00 0.21   C-REACTIVE PROTEIN      <8.0 mg/L 391.9 (H)   CYCLIC CITRULLINATED$PEPTIDE (CCP) AB (IGG)      UNITS <16   SED RATE BY MODIFIED$WESTERGREN      < OR = 20 mm/h >130 (H)   HLA-B27 ANTIGEN      NEGATIVE NEGATIVE   HBSAg Screen      NON-REACTIVE NON-REACTIVE   HEPATITIS B SURFACE$ANTIBODY QL      NON-REACTIVE REACTIVE (A)   HEPATITIS B CORE AB TOTAL      NON-REACTIVE NON-REACTIVE     Imaging:     XR R wrist:  BONES: There is mild narrowing of the radial scaphoid joint space. There is marked narrowing of the joint space between the lunate and capitate with associated subchondral sclerosis. Two erosions project along the lateral cortex of the scaphoid   measuring 3 mm. There is a 4 mm erosion along the medial triquetrum. There is erosive change along the tip of the ulnar styloid. SOFT TISSUES: Negative. No visible soft tissue swelling. EFFUSION: None visible. OTHER: Negative.      ASSESSMENT/PLAN:     Seronegative RA/spondylarthritis- improved  - X-ray of the right wrist showed evidence of marked narrowing with erosions  - Also right knee synovial biopsy in March 2022 was suggestive of rheumatoid arthritis  - Elevated ESR and CRP  - Currently on Humira every 2 weeks, methotrexate 0.8 ml Subcut injection weekly and folic acid daily  - Joint pain is improved significantly on this regimen  - Recent blood work   - Blood work every 3 mos     Bilateral knee replacement secondary to OA  - Again right knee synovial biopsy did show evidence of inflammation, possible suggestive of RA     Left carpal tunnel release- stable  - Symptoms are stable. Continues to have some numbness and tingling  - will use wrist splint     Right shoulder dislocation- improved, stable   - He dislocated his right shoulder back in March 2022, holding off on surgery now   - ROM has improved, less painful now      Chronic left shoulder pain. Improved   - Following with orthopedic, has been ongoing for the past 10 years.   States that he would likely need a shoulder surgery    Pt will f/u in 4 mos     Jud Pickens MD  7/19/2023  3:00 PM

## 2023-07-20 RX ORDER — ADALIMUMAB 40MG/0.4ML
40 KIT SUBCUTANEOUS
Qty: 2 EACH | Refills: 5 | Status: SHIPPED | OUTPATIENT
Start: 2023-07-20

## 2023-07-20 NOTE — TELEPHONE ENCOUNTER
Humira refill    LOV: 7/19/23  Future Appointments   Date Time Provider Red Palenciai   8/8/2023  1:15 PM Serge Wayne MD [facility] [place]   11/6/2023  3:00 PM Gi Gutierrez MD [facility]   LABS:  Care everywhere

## 2023-07-24 LAB
ABSOLUTE BASOPHILS: 68 CELLS/UL (ref 0–200)
ABSOLUTE EOSINOPHILS: 238 CELLS/UL (ref 15–500)
ABSOLUTE LYMPHOCYTES: 1913 CELLS/UL (ref 850–3900)
ABSOLUTE MONOCYTES: 1012 CELLS/UL (ref 200–950)
ABSOLUTE NEUTROPHILS: 5270 CELLS/UL (ref 1500–7800)
ALBUMIN: 4.2 G/DL (ref 3.6–5.1)
ALT: 42 U/L (ref 9–46)
AST: 23 U/L (ref 10–35)
BASOPHILS: 0.8 %
C-REACTIVE PROTEIN: 9.8 MG/L
CREATININE: 1.12 MG/DL (ref 0.7–1.35)
EGFR: 73 ML/MIN/1.73M2
EOSINOPHILS: 2.8 %
HEMATOCRIT: 39.4 % (ref 38.5–50)
HEMOGLOBIN: 13.6 G/DL (ref 13.2–17.1)
LYMPHOCYTES: 22.5 %
MCH: 35.1 PG (ref 27–33)
MCHC: 34.5 G/DL (ref 32–36)
MCV: 101.8 FL (ref 80–100)
MONOCYTES: 11.9 %
MPV: 8.8 FL (ref 7.5–12.5)
NEUTROPHILS: 62 %
PLATELET COUNT: 355 THOUSAND/UL (ref 140–400)
RDW: 14.1 % (ref 11–15)
RED BLOOD CELL COUNT: 3.87 MILLION/UL (ref 4.2–5.8)
SED RATE BY MODIFIED$WESTERGREN: 11 MM/H
WHITE BLOOD CELL COUNT: 8.5 THOUSAND/UL (ref 3.8–10.8)

## 2023-07-25 NOTE — TELEPHONE ENCOUNTER
PA Approved    Prior authorization for: Humira    Medication form: 40 mg pen     Date received: 7/17/2023    Approval #: [id number]    Approved dates: 7/17/23 to 7/17/2024    Component      Latest Ref Rng 6/13/2022   QUANTIFERON(R)-TB GOLD PLUS, 1 TUBE      NEGATIVE  NEGATIVE    NIL      IU/mL 0.02    MITOGEN-NIL      IU/mL >10.00    TB1-NIL      IU/mL 0.01    TB2-NIL      IU/mL 0.01

## 2023-08-08 ENCOUNTER — OFFICE VISIT (OUTPATIENT)
Dept: FAMILY MEDICINE CLINIC | Facility: CLINIC | Age: 63
End: 2023-08-08

## 2023-08-08 VITALS
HEIGHT: 70 IN | RESPIRATION RATE: 17 BRPM | DIASTOLIC BLOOD PRESSURE: 70 MMHG | HEART RATE: 69 BPM | WEIGHT: 238.69 LBS | SYSTOLIC BLOOD PRESSURE: 112 MMHG | BODY MASS INDEX: 34.17 KG/M2

## 2023-08-08 DIAGNOSIS — I10 ESSENTIAL HYPERTENSION: ICD-10-CM

## 2023-08-08 DIAGNOSIS — Z13.0 SCREENING FOR DEFICIENCY ANEMIA: ICD-10-CM

## 2023-08-08 DIAGNOSIS — N52.9 ERECTILE DYSFUNCTION, UNSPECIFIED ERECTILE DYSFUNCTION TYPE: ICD-10-CM

## 2023-08-08 DIAGNOSIS — R09.82 POSTNASAL DRIP: ICD-10-CM

## 2023-08-08 DIAGNOSIS — Z12.2 ENCOUNTER FOR SCREENING FOR LUNG CANCER: ICD-10-CM

## 2023-08-08 DIAGNOSIS — Z13.220 LIPID SCREENING: ICD-10-CM

## 2023-08-08 DIAGNOSIS — I25.10 ATHEROSCLEROSIS OF NATIVE CORONARY ARTERY OF NATIVE HEART WITHOUT ANGINA PECTORIS: ICD-10-CM

## 2023-08-08 DIAGNOSIS — Z00.00 WELLNESS EXAMINATION: Primary | ICD-10-CM

## 2023-08-08 PROCEDURE — 99397 PER PM REEVAL EST PAT 65+ YR: CPT | Performed by: FAMILY MEDICINE

## 2023-08-08 PROCEDURE — 99214 OFFICE O/P EST MOD 30 MIN: CPT | Performed by: FAMILY MEDICINE

## 2023-08-08 PROCEDURE — 3008F BODY MASS INDEX DOCD: CPT | Performed by: FAMILY MEDICINE

## 2023-08-08 PROCEDURE — 3074F SYST BP LT 130 MM HG: CPT | Performed by: FAMILY MEDICINE

## 2023-08-08 PROCEDURE — 3078F DIAST BP <80 MM HG: CPT | Performed by: FAMILY MEDICINE

## 2023-08-08 RX ORDER — ECHINACEA PURPUREA EXTRACT 125 MG
1 TABLET ORAL AS NEEDED
Qty: 30 ML | Refills: 0 | Status: SHIPPED | OUTPATIENT
Start: 2023-08-08

## 2023-08-08 RX ORDER — SILDENAFIL 100 MG/1
100 TABLET, FILM COATED ORAL
Qty: 90 TABLET | Refills: 0 | Status: SHIPPED | OUTPATIENT
Start: 2023-08-08

## 2023-08-08 RX ORDER — CHLORTHALIDONE 25 MG/1
25 TABLET ORAL DAILY
Qty: 90 TABLET | Refills: 3 | Status: SHIPPED | OUTPATIENT
Start: 2023-08-08

## 2023-08-08 RX ORDER — LOSARTAN POTASSIUM 25 MG/1
12.5 TABLET ORAL DAILY
Qty: 45 TABLET | Refills: 0 | Status: SHIPPED | OUTPATIENT
Start: 2023-08-08

## 2023-08-10 ENCOUNTER — TELEPHONE (OUTPATIENT)
Dept: FAMILY MEDICINE CLINIC | Facility: CLINIC | Age: 63
End: 2023-08-10

## 2023-08-18 DIAGNOSIS — R73.09 ELEVATED GLUCOSE: ICD-10-CM

## 2023-08-18 DIAGNOSIS — D75.89 MACROCYTIC: Primary | ICD-10-CM

## 2023-08-18 LAB
ABSOLUTE BASOPHILS: 51 CELLS/UL (ref 0–200)
ABSOLUTE EOSINOPHILS: 315 CELLS/UL (ref 15–500)
ABSOLUTE LYMPHOCYTES: 2100 CELLS/UL (ref 850–3900)
ABSOLUTE MONOCYTES: 944 CELLS/UL (ref 200–950)
ABSOLUTE NEUTROPHILS: 5092 CELLS/UL (ref 1500–7800)
ALBUMIN/GLOBULIN RATIO: 1.6 (CALC) (ref 1–2.5)
ALBUMIN: 4.3 G/DL (ref 3.6–5.1)
ALKALINE PHOSPHATASE: 67 U/L (ref 35–144)
ALT: 53 U/L (ref 9–46)
AST: 28 U/L (ref 10–35)
BASOPHILS: 0.6 %
BILIRUBIN, TOTAL: 0.5 MG/DL (ref 0.2–1.2)
BUN/CREATININE RATIO: 27 (CALC) (ref 6–22)
BUN: 31 MG/DL (ref 7–25)
CALCIUM: 9.7 MG/DL (ref 8.6–10.3)
CARBON DIOXIDE: 27 MMOL/L (ref 20–32)
CHLORIDE: 103 MMOL/L (ref 98–110)
CHOL/HDLC RATIO: 2.8 (CALC)
CHOLESTEROL, TOTAL: 139 MG/DL
CREATININE: 1.16 MG/DL (ref 0.7–1.35)
EGFR: 70 ML/MIN/1.73M2
EOSINOPHILS: 3.7 %
GLOBULIN: 2.7 G/DL (CALC) (ref 1.9–3.7)
GLUCOSE: 112 MG/DL (ref 65–99)
HDL CHOLESTEROL: 49 MG/DL
HEMATOCRIT: 40.5 % (ref 38.5–50)
HEMOGLOBIN: 13.6 G/DL (ref 13.2–17.1)
LDL-CHOLESTEROL: 76 MG/DL (CALC)
LYMPHOCYTES: 24.7 %
MCH: 34.5 PG (ref 27–33)
MCHC: 33.6 G/DL (ref 32–36)
MCV: 102.8 FL (ref 80–100)
MONOCYTES: 11.1 %
MPV: 9 FL (ref 7.5–12.5)
NEUTROPHILS: 59.9 %
NON-HDL CHOLESTEROL: 90 MG/DL (CALC)
PLATELET COUNT: 378 THOUSAND/UL (ref 140–400)
POTASSIUM: 4.5 MMOL/L (ref 3.5–5.3)
PROTEIN, TOTAL: 7 G/DL (ref 6.1–8.1)
RDW: 13.8 % (ref 11–15)
RED BLOOD CELL COUNT: 3.94 MILLION/UL (ref 4.2–5.8)
SODIUM: 139 MMOL/L (ref 135–146)
TRIGLYCERIDES: 55 MG/DL
WHITE BLOOD CELL COUNT: 8.5 THOUSAND/UL (ref 3.8–10.8)

## 2023-08-22 ENCOUNTER — HOSPITAL ENCOUNTER (OUTPATIENT)
Dept: CT IMAGING | Age: 63
Discharge: HOME | End: 2023-08-22
Attending: FAMILY MEDICINE

## 2023-08-22 DIAGNOSIS — I25.10 ATHEROSCLEROSIS OF NATIVE CORONARY ARTERY OF NATIVE HEART WITHOUT ANGINA PECTORIS: ICD-10-CM

## 2023-08-22 NOTE — PROGRESS NOTES
Date of Service 8/22/2023    CARLOS HOLBROOK  Date of Birth 1/5/1960    Patient Age: 70year old    PCP: Jany Cruz MD  [id number]    Heart Scan Consult  Preliminary Heart Scan Score: 970    Previous Screening  Heart Scan Completed Previously: No        Peripheral Vascular Scan Completed Previously: No          Risk Factors  Personal Risk Factors  Non-alterable Risk Factors: Personal History;Age;Gender;Family History  Alterable Risk Factors: Abnormal Cholesterol;Lack of exercise;Unhealthy eating        Blood Pressure  Blood Pressure measurement declined during this encounter. (Normal =< 120/80,  Elevated = 120-129/ >80,  High Stage1 130-139/80-89 , Stage2 >140/>90)    Lipid Profile  Cholesterol: 139, done on 8/17/2023. HDL Cholesterol: 49, done on 8/17/2023. LDL Cholesterol: 76, done on 8/17/2023. TriGlycerides 55, done on 8/17/2023. Cholesterol Goals  Value   Total  =< 200   HDL  = > 45 Men = > 55 Women   LDL   =< 100   Triglycerides  =< 150       Glucose and Hemoglobin A1C  Lab Results   Component Value Date     (H) 08/17/2023    A1C 6.3 (H) 03/16/2022     (Normal Fasting Glucose < 100mg/dl )    Nurse Review  Risk factor information and results reviewed with Nurse: Yes    Recommended Follow Up:  Consult your physician regarding[de-identified]   Final Heart Scan Report; Discuss potential for Incidental Finding      Recommendations for Change:  Nutrition Changes: Low Saturated Fat;Low Fat Dairy; Increase Fiber    Cholesterol Modification (goal of therapy depends upon your risk): Increase HDL (Healthy/Good) Normal >45 Men >55 Women;  Decrease LDL (Lousy/Bad) Ideal <100    Exercise: Discuss exercise routine with your doctor. Repeat Heart Scan:   3 Years if Calcium Score is > 0.0;   Discuss with your Physician              [place] Recommended Resources:  Recommended Resources: Upcoming Classes, Medical Services and [facility] [email or web address];    PV Screening  Recommended PV Screening: Abdomen; Ankle-Brachial Index (RIMA); Carotids         Kristyn Maciel RN        Please Contact the Nurse Heart Line with any Questions or Concerns [phone].

## 2023-11-06 ENCOUNTER — LAB ENCOUNTER (OUTPATIENT)
Dept: LAB | Facility: HOSPITAL | Age: 63
End: 2023-11-06
Attending: INTERNAL MEDICINE
Payer: COMMERCIAL

## 2023-11-06 ENCOUNTER — OFFICE VISIT (OUTPATIENT)
Dept: RHEUMATOLOGY | Facility: CLINIC | Age: 63
End: 2023-11-06

## 2023-11-06 VITALS
SYSTOLIC BLOOD PRESSURE: 116 MMHG | HEART RATE: 69 BPM | BODY MASS INDEX: 34.65 KG/M2 | WEIGHT: 242 LBS | HEIGHT: 70 IN | DIASTOLIC BLOOD PRESSURE: 70 MMHG

## 2023-11-06 DIAGNOSIS — Z51.81 MEDICATION MONITORING ENCOUNTER: ICD-10-CM

## 2023-11-06 DIAGNOSIS — M06.09 RHEUMATOID ARTHRITIS OF MULTIPLE SITES WITHOUT RHEUMATOID FACTOR (HCC): ICD-10-CM

## 2023-11-06 DIAGNOSIS — M06.09 RHEUMATOID ARTHRITIS OF MULTIPLE SITES WITHOUT RHEUMATOID FACTOR (HCC): Primary | ICD-10-CM

## 2023-11-06 LAB
ALBUMIN SERPL-MCNC: 4.5 G/DL (ref 3.2–4.8)
ALT SERPL-CCNC: 50 U/L
AST SERPL-CCNC: 29 U/L (ref ?–34)
BASOPHILS # BLD AUTO: 0.06 X10(3) UL (ref 0–0.2)
BASOPHILS NFR BLD AUTO: 0.6 %
CREAT BLD-MCNC: 1.35 MG/DL
CRP SERPL-MCNC: 0.8 MG/DL (ref ?–1)
DEPRECATED RDW RBC AUTO: 53.5 FL (ref 35.1–46.3)
EGFRCR SERPLBLD CKD-EPI 2021: 58 ML/MIN/1.73M2 (ref 60–?)
EOSINOPHIL # BLD AUTO: 0.33 X10(3) UL (ref 0–0.7)
EOSINOPHIL NFR BLD AUTO: 3 %
ERYTHROCYTE [DISTWIDTH] IN BLOOD BY AUTOMATED COUNT: 14.7 % (ref 11–15)
ERYTHROCYTE [SEDIMENTATION RATE] IN BLOOD: 20 MM/HR
HCT VFR BLD AUTO: 41.6 %
HGB BLD-MCNC: 13.9 G/DL
IMM GRANULOCYTES # BLD AUTO: 0.05 X10(3) UL (ref 0–1)
IMM GRANULOCYTES NFR BLD: 0.5 %
LYMPHOCYTES # BLD AUTO: 2.48 X10(3) UL (ref 1–4)
LYMPHOCYTES NFR BLD AUTO: 22.9 %
MCH RBC QN AUTO: 33.9 PG (ref 26–34)
MCHC RBC AUTO-ENTMCNC: 33.4 G/DL (ref 31–37)
MCV RBC AUTO: 101.5 FL
MONOCYTES # BLD AUTO: 1.34 X10(3) UL (ref 0.1–1)
MONOCYTES NFR BLD AUTO: 12.4 %
NEUTROPHILS # BLD AUTO: 6.57 X10 (3) UL (ref 1.5–7.7)
NEUTROPHILS # BLD AUTO: 6.57 X10(3) UL (ref 1.5–7.7)
NEUTROPHILS NFR BLD AUTO: 60.6 %
PLATELET # BLD AUTO: 355 10(3)UL (ref 150–450)
RBC # BLD AUTO: 4.1 X10(6)UL
WBC # BLD AUTO: 10.8 X10(3) UL (ref 4–11)

## 2023-11-06 PROCEDURE — 3078F DIAST BP <80 MM HG: CPT | Performed by: INTERNAL MEDICINE

## 2023-11-06 PROCEDURE — 85652 RBC SED RATE AUTOMATED: CPT

## 2023-11-06 PROCEDURE — 3074F SYST BP LT 130 MM HG: CPT | Performed by: INTERNAL MEDICINE

## 2023-11-06 PROCEDURE — 36415 COLL VENOUS BLD VENIPUNCTURE: CPT

## 2023-11-06 PROCEDURE — 84450 TRANSFERASE (AST) (SGOT): CPT

## 2023-11-06 PROCEDURE — 86140 C-REACTIVE PROTEIN: CPT

## 2023-11-06 PROCEDURE — 85025 COMPLETE CBC W/AUTO DIFF WBC: CPT

## 2023-11-06 PROCEDURE — 82565 ASSAY OF CREATININE: CPT

## 2023-11-06 PROCEDURE — 99214 OFFICE O/P EST MOD 30 MIN: CPT | Performed by: INTERNAL MEDICINE

## 2023-11-06 PROCEDURE — 3008F BODY MASS INDEX DOCD: CPT | Performed by: INTERNAL MEDICINE

## 2023-11-06 PROCEDURE — 82040 ASSAY OF SERUM ALBUMIN: CPT

## 2023-11-06 PROCEDURE — 84460 ALANINE AMINO (ALT) (SGPT): CPT

## 2023-11-06 NOTE — PROGRESS NOTES
Najma Stokes is a 70year old male. HPI:   Patient presents with:  Rheumatoid Arthritis      I had the pleasure of seeing Najma Stokes on 11/6/2023 for follow up Seronegative RA/Spondyloarthritis. Current medications:  Humira every 2 weeks- started 8/11/2022  Methotrexate 0.6 mL injections weekly- started 8/9/2022  Previous medications:  Prednisone- end July 2022-11/2022  Blood work:  Neg VALENCIA, RF, CCP, HLA-B27, CK  , >130    Interval History: This is a 58 yo M with hx of HTN, B/L TKR (Dec 2021 and March 2022), L hand Carpal tunnel release (April 2022) presents with polyarthralgias. About 1 year ago around June 2021 he was doing really well. Had very minimal joint pain. In June 2021 they sold their house at that time started having worsening joint pain in his knees. Mostly his left knee. X-rays of the knees showed significant medial compartment narrowing in the left knee and moderate medial compartment in the right knee. He then had his left knee replaced. Overall did pretty good. In March he had his right knee replaced and since then has had worsening joint pain overall. He fell out of bed and dislocated his right shoulder in March, he had a shoulder reduction but was seen by orthopedic and they are recommending surgery. He has chronic left shoulder pain from wear-and-tear from his job. He worked as a  for his entire career. He is also supposed to be having left shoulder surgery. He then started to have worsening pain in his hands. He had swelling in his right wrist.  Swelling in his right wrist has been since December 2021. He also had left elbow pain and swelling. He received a cortisone shot in his right wrist and left elbow in April 2022. It did help some of his symptoms. He had some numbness and tingling in his hands also. He had an EMG done around April 2022 and per PCPs note he was diagnosed with neuropathy in both upper extremities.   He then had left carpal tunnel release surgery in April 2022. The most recent joint pain has been his right wrist and left elbow. Blood work in April 2022 did show elevated , repeat in May was >130. Denies any history of psoriasis, temporal pain, headache, jaw pain or blurry vision  Reviewing his right knee synovial biopsy from March 2022, findings are suggestive of rheumatoid arthritis    6/29/20022:  Presents for f/u of Seronegative RA  Recent blood work showed ESR>130 and CRP 15.1 mg/dL  Negative RF, CCP and HLA-B27  X-ray of the right wrist does show marked joint space narrowing and 2 erosions on the lateral cortex of the scaphoid, erosive changes on the ulnar styloid  Has been on prednisone for about 2 weeks now, on prednisone 10 mg daily  Now  Joints feel 40% better. Has some mild pain in the hands and also some numbness in the L hand. R hand is doing better, mild pain in the wrist   Shoulder have chronic pain, r shoulder abduction limited to 30 degrees and left shoulder abduction limited to 90 degrees  Knees are doing better, can walk a mile   Also in PT twice a week for the knees and the shoulders  Had a cortisone shot in R shoulder last week and helped it a lot     8/8/2022:  Presents for f/u of Seronegative RA  Recent blood work showed ESR>130 and CRP 15.1 mg/dL  Negative RF, CCP and HLA-B27  X-ray of the right wrist does show marked joint space narrowing and 2 erosions on the lateral cortex of the scaphoid, erosive changes on the ulnar styloid  Remains on prednisone 10 mg daily, decreased to 5 mg but had more s/e  Having a lot of joint pain and swelling and difficulty ambulating due to his pain  Reports swelling in both hands, fingers, right wrist and right ankle.   Also having pain in both knees  Patient wife states that his symptoms have worsened significantly    9/20/2022:   Presents for follow-up of seronegative RA/spondylarthritis  He was started on Humira and methotrexate back in August.  He also remains on prednisone 10 mg daily  Joint pain has significantly improved  He is able to do his own ADLs. Can walk 5000 steps a day. Hands are better. Wife states that during his last visit he was not able to do anything for himself, not able to do his own ADLs  States that he can make a fist now. Right ankle swelling improved  Seen by orthopedic and planning on having his shoulder surgery at the end of November 12/20/2022:   Presents for follow-up of seronegative RA/spondylarthritis  Currently on methotrexate 8 pills weekly and Humira every 2 weeks  Has been off of prednisone now since November  When tapered off prednisone has some worsening joint pain for about 1 week and then joints got better. Having some pain and swelling in the ankles, R is worse than L  Ankles are more swollen, less pain. When wake up in AM no swelling in ankles but as days goes on swelling worsens. He was on a diuretic, chlorthalidone but stopped a month ago. prescription ran out and was not renew. Has been really active, walking 3 times a week. Also has been golfing and bowling. Has been getting more nauseus and has been having some headaches. 3/15/2023:   Presents for follow-up of seronegative RA/spondylarthritis  Currently on methotrexate 0.8 mL injecitons weekly and Humira every 2 weeks  Was switched from oral to injection methotrexate last visit  Having some clicking and popping in ankles and hips. mostly the left hip going up to the left gluteal region  Also working out at gym 4 days a week, walks for 30 min and some weights. Some soreness in the left elbow  r shoulder is doing better, less pain.  Able to raise it now  Hands and wrist doing good, can make a fist    7/19/2023:   Presents for follow-up of seronegative RA/spondylarthritis  Currently on methotrexate 0.8 mL injecitons weekly and Humira every 2 weeks  Has been bike riding, 17 miles and doing well  Minimal joint pain  Doing well overall    11/6/2023:   Presents for follow-up of seronegative RA/spondylarthritis  Currently on methotrexate 0.6 mL injecitons weekly and Humira every 2 weeks  Joints are pretty stable  Continues to stay active, biked 20 miles yesterday. R shoulder is good some mild pain in the left shoulder          HISTORY:  Past Medical History:   Diagnosis Date    High cholesterol     Osteoarthritis       Social Hx Reviewed   Family Hx Reviewed     Medications (Active prior to today's visit):  Current Outpatient Medications   Medication Sig Dispense Refill    rosuvastatin 5 MG Oral Tab Take 1 tablet (5 mg total) by mouth nightly. 90 tablet 0    Sildenafil Citrate (VIAGRA) 100 MG Oral Tab Take 1 tablet (100 mg total) by mouth daily as needed for Erectile Dysfunction. 90 tablet 0    losartan 25 MG Oral Tab Take 0.5 tablets (12.5 mg total) by mouth daily. 45 tablet 0    chlorthalidone 25 MG Oral Tab Take 1 tablet (25 mg total) by mouth daily. 90 tablet 3    HUMIRA PEN 40 MG/0.4ML Subcutaneous Pen-injector Kit Inject 40 mg into the skin every 14 (fourteen) days. 2 each 5    folic acid 1 MG Oral Tab Take 1 tablet (1 mg total) by mouth daily. 90 tablet 3    sodium chloride 0.65 % Nasal Solution 1 spray by Nasal route as needed for congestion. (Patient not taking: Reported on 11/6/2023) 30 mL 0    Insulin Syringe 30G X 5/16\" 1 ML Does not apply Misc Patient will inject methotrexate subcutaneously 0.8 mL every 7 days 4 each 2     .cmed  Allergies:  No Known Allergies      ROS:   All other ROS are negative. PHYSICAL EXAM:   GEN: AAOx3, NAD  HEENT: EOMI, PERRLA, no injection or icterus, oral mucosa moist, no oral lesions. No lymphadenopathy. No facial rash  CVS: RRR, no murmurs rubs or gallops. Equal 2+ distal pulses. LUNGS: CTAB, no increased work of breathing  ABDOMEN:  soft NT/ND, +BS, no HSM  SKIN: No rashes or skin lesions. No nail findings  MSK:  Cervical spine: FROM  Hands: Swelling improved, has happened Nicky's nodes.   Able to make a full fist  Wrist: no swelling now  Elbow: FROM, no pain or swelling or warmth on palpation  Shoulders: Bilateral shoulder abduction now limited to 100 degrees  Hip: normal log roll, no lateral hip pain, JOSE ALBERTO test negative b/l  Knees: FROM, no warmth or effusion present. No pain with ROM. Ankles: R ankle no swelling  Feet: no pain with MTP squeeze, no toe swelling or pain or warmth on palpation with FROM  Spine: no lumbar or sacral pain on palpation. EXT: 1+edema in LE   NEURO: Cranial nerves II-XII intact grossly. 5/5 strength throughout in both upper and lower extremities, sensation intact. PSYCH: normal mood       LABS:     Component      Latest Ref Rng & Units 6/13/2022   QUANTIFERON(R)-TB GOLD PLUS, 1 TUBE      NEGATIVE NEGATIVE   NIL      IU/mL 0.02   MITOGEN-NIL      IU/mL >10.00   TB1-NIL      IU/mL 0.01   TB2-NIL      IU/mL 0.01   HCV AB      NON-REACTIVE NON-REACTIVE   SIGNAL TO CUT-OFF      <1.00 0.21   C-REACTIVE PROTEIN      <8.0 mg/L 017.3 (H)   CYCLIC CITRULLINATED$PEPTIDE (CCP) AB (IGG)      UNITS <16   SED RATE BY MODIFIED$WESTERGREN      < OR = 20 mm/h >130 (H)   HLA-B27 ANTIGEN      NEGATIVE NEGATIVE   HBSAg Screen      NON-REACTIVE NON-REACTIVE   HEPATITIS B SURFACE$ANTIBODY QL      NON-REACTIVE REACTIVE (A)   HEPATITIS B CORE AB TOTAL      NON-REACTIVE NON-REACTIVE     Imaging:     XR R wrist:  BONES: There is mild narrowing of the radial scaphoid joint space. There is marked narrowing of the joint space between the lunate and capitate with associated subchondral sclerosis. Two erosions project along the lateral cortex of the scaphoid   measuring 3 mm. There is a 4 mm erosion along the medial triquetrum. There is erosive change along the tip of the ulnar styloid. SOFT TISSUES: Negative. No visible soft tissue swelling. EFFUSION: None visible. OTHER: Negative.      ASSESSMENT/PLAN:     Seronegative RA/spondylarthritis- improved  - X-ray of the right wrist showed evidence of marked narrowing with erosions  - Also right knee synovial biopsy in March 2022 was suggestive of rheumatoid arthritis  - Elevated ESR and CRP  - Currently on Humira every 2 weeks, methotrexate 0.6 ml Subcut injection weekly and folic acid daily  - Joint pain is improved significantly on this regimen  - Blood work every today and every 3 mos     Bilateral knee replacement secondary to OA  - Again right knee synovial biopsy did show evidence of inflammation, possible suggestive of RA     Left carpal tunnel release- stable  - Symptoms are stable. Continues to have some numbness and tingling  - will use wrist splint     Right shoulder dislocation- improved, stable   - He dislocated his right shoulder back in March 2022, holding off on surgery now   - ROM has improved, less painful now      Chronic left shoulder pain. Improved   - Following with orthopedic, has been ongoing for the past 10 years.   States that he would likely need a shoulder surgery    Pt will f/u in 4 mos     Merlinda Fontana, MD  11/6/2023  3:20 PM

## 2023-11-08 DIAGNOSIS — I10 ESSENTIAL HYPERTENSION: ICD-10-CM

## 2023-11-09 RX ORDER — LOSARTAN POTASSIUM 25 MG/1
12.5 TABLET ORAL DAILY
Qty: 45 TABLET | Refills: 3 | Status: SHIPPED | OUTPATIENT
Start: 2023-11-09

## 2023-11-09 NOTE — TELEPHONE ENCOUNTER
Dr. Puneet Vargas - continue Losartan, correct? Pended for review. Please review the tests below for reference. Per Dr. Puneet Vargas Visit 8/8/23     \"Patient may be having cough secondary to the use of lisinopril, blood pressure has been under excellent control and he is on a very low-dose of ACE inhibitor but I explained to patient and wife that medication also can be used to prevent cardiac modulation in the context of coronary artery disease, he is off his statin at this time and he would prefer to avoid the use of medication that is not necessary, we discussed the option of calcium scoring to clarify the extent of the atherosclerosis and from there decide how intensive conservative measures should be. He will complete updated lipid panel without statin as well as updated blood work. He also would be presenting with postnasal drip but there is no evident edema and nasal mucosa, will start night nasal saline and monitor response, may consider the option of nasal steroids but will await response for now. Is having good response with Viagra, continue present treatment. If the option would be to discontinue losartan patient will need to monitor potassium levels as he will be only on chlorthalidone\"       Lipid Blood Test on 8/17/23       CBC With Differential With Platelet: Patient Communication     Append Comments   Seen    Dear Eula Serrano,     I have reviewed your test results. Tests show that your glucose levels are slightly above goal, I have placed an order to complete hemoglobin A1c that would let us know your percentage of glucose in the last 3 months, cholesterol levels are at goal.  Regarding your CBC the red blood cells are slightly low but they have improved since the last testing.   More specifically your cells are slightly bigger and this may be related to the methotrexate, I have placed an order to recheck your folic acid level as well as vitamin B12 to make sure if you do need further supplementation, there's no need to fast for this test. Please let me know if you have any questions. Best Regards,  Terri Reyes. Светлана Cohen MD   Written by Russell Abel MD on 8/18/2023 12:04 PM CDT  Seen by patient Eula Serrano on 8/22/2023  3:59 PM      8/22/23 Calcium Score     CT CALCIUM SCORING OVER READ: Patient Communication     Edit Comments   Seen    Dear Eula Serrano,     I have reviewed your test results. Tests suggest calcifications in the arteries, I'd like you to see cardiology to make sure this obstruction is not significant, in the meantime the goals are to improve cholesterol control, please resume rosuvastatin, I have sent the refill and  to maintain good blood pressure control. The information of the referral is below, the rest of the test was unremarkable. Please let me know if you have any questions. Best Regards,  Terri Reyes.  Светлана Cohen MD        Referred to Provider Information:  Flakita High MD  [address] [place] [address] [address]  [address] [facility] [phone]   Written by Russell Abel MD on 8/26/2023  9:00 AM CDT  Seen by patient Eula Serrano on 11/8/2023  7:59 AM

## 2023-12-27 NOTE — TELEPHONE ENCOUNTER
Requested Prescriptions     Pending Prescriptions Disp Refills    HUMIRA PEN 40 MG/0.4ML Subcutaneous Pen-injector Kit 2 each 5     Sig: Inject 40 mg into the skin every 14 (fourteen) days. LF: 7/20/23 #2 EACH W/ 5 RF  LOV: 11/6/23   Future Appointments   Date Time Provider [place] Co[place]   3/18/2024  3:00 PM Apolinar Dance, MD [facility]     Labs:   Component      Latest Ref Rng 11/6/2023   WBC      4.0 - 11.0 x10(3) uL 10.8    RBC      3.80 - 5.80 x10(6)uL 4.10    Hemoglobin      13.0 - 17.5 g/dL 13.9    Hematocrit      39.0 - 53.0 % 41.6    MCV      80.0 - 100.0 fL 101.5 (H)    MCH      26.0 - 34.0 pg 33.9    MCHC      31.0 - 37.0 g/dL 33.4    RDW-SD      35.1 - 46.3 fL 53.5 (H)    RDW      11.0 - 15.0 % 14.7    Platelet Count      084.6 - 450.0 10(3)uL 355.0    Prelim Neutrophil Abs      1.50 - 7.70 x10 (3) uL 6.57    Neutrophils Absolute      1.50 - 7.70 x10(3) uL 6.57    Lymphocytes Absolute      1.00 - 4.00 x10(3) uL 2.48    Monocytes Absolute      0.10 - 1.00 x10(3) uL 1.34 (H)    Eosinophils Absolute      0.00 - 0.70 x10(3) uL 0.33    Basophils Absolute      0.00 - 0.20 x10(3) uL 0.06    Immature Granulocyte Absolute      0.00 - 1.00 x10(3) uL 0.05    Neutrophils %      % 60.6    Lymphocytes %      % 22.9    Monocytes %      % 12.4    Eosinophils %      % 3.0    Basophils %      % 0.6    Immature Granulocyte %      % 0.5    CREATININE      0.70 - 1.30 mg/dL 1.35 (H)    EGFR      >=60 mL/min/1.73m2 58 (L)    Albumin      3.2 - 4.8 g/dL 4.5    ALT (SGPT)      10 - 49 U/L 50 (H)    AST (SGOT)      <=34 U/L 29    C-REACTIVE PROTEIN      <1.00 mg/dL 0.80    SED RATE      0 - 20 mm/Hr 20       Legend:  (H) High  (L) Low    Negative.       ASSESSMENT/PLAN:      Seronegative RA/spondylarthritis- improved  - X-ray of the right wrist showed evidence of marked narrowing with erosions  - Also right knee synovial biopsy in March 2022 was suggestive of rheumatoid arthritis  - Elevated ESR and CRP  - Currently on Humira every 2 weeks, methotrexate 0.6 ml Subcut injection weekly and folic acid daily  - Joint pain is improved significantly on this regimen  - Blood work every today and every 3 mos      Bilateral knee replacement secondary to OA  - Again right knee synovial biopsy did show evidence of inflammation, possible suggestive of RA     Left carpal tunnel release- stable  - Symptoms are stable. Continues to have some numbness and tingling  - will use wrist splint     Right shoulder dislocation- improved, stable   - He dislocated his right shoulder back in March 2022, holding off on surgery now   - ROM has improved, less painful now      Chronic left shoulder pain. Improved   - Following with orthopedic, has been ongoing for the past 10 years.   States that he would likely need a shoulder surgery     Pt will f/u in 4 mos      Pratibha Pop MD  11/6/2023  3:20 PM

## 2023-12-28 RX ORDER — ADALIMUMAB 40MG/0.4ML
40 KIT SUBCUTANEOUS
Qty: 2 EACH | Refills: 5 | Status: SHIPPED | OUTPATIENT
Start: 2023-12-28

## 2024-01-18 ENCOUNTER — PATIENT MESSAGE (OUTPATIENT)
Dept: RHEUMATOLOGY | Facility: CLINIC | Age: 64
End: 2024-01-18

## 2024-01-18 RX ORDER — METHOTREXATE 25 MG/ML
0.8 INJECTION, SOLUTION INTRA-ARTERIAL; INTRAMUSCULAR; INTRAVENOUS
COMMUNITY
Start: 2023-11-21 | End: 2024-01-18

## 2024-01-18 NOTE — TELEPHONE ENCOUNTER
Future Appointments   Date Time Provider Department Center   3/18/2024  3:00 PM [facility]

## 2024-01-18 NOTE — TELEPHONE ENCOUNTER
From: Cody Savage  To: Alejandra Hayes  Sent: 1/18/2024 2:53 PM CST  Subject: Med refill    Happy New Year, Dr. Hayes.    We ordered a refill of the methotrexate for René and when he picked it up there were only 2 vials, a 28 day allowance. The pharmacy told him there is only one more refill left and he needs a new script from you. Can you please send an order to refill for him to [facility]?    Thank you.

## 2024-01-18 NOTE — TELEPHONE ENCOUNTER
LOV: 11/6/23  Last Refilled:#9.6ml, 1rf 5/1/23  Labs:AST 29  ALT 50  11/6/23  ASSESSMENT/PLAN:      Seronegative RA/spondylarthritis- improved  - X-ray of the right wrist showed evidence of marked narrowing with erosions  - Also right knee synovial biopsy in March 2022 was suggestive of rheumatoid arthritis  - Elevated ESR and CRP  - Currently on Humira every 2 weeks, methotrexate 0.6 ml Subcut injection weekly and folic acid daily  - Joint pain is improved significantly on this regimen  - Blood work every today and every 3 mos      Bilateral knee replacement secondary to OA  - Again right knee synovial biopsy did show evidence of inflammation, possible suggestive of RA     Left carpal tunnel release- stable  - Symptoms are stable.  Continues to have some numbness and tingling  - will use wrist splint     Right shoulder dislocation- improved, stable   - He dislocated his right shoulder back in March 2022, holding off on surgery now   - ROM has improved, less painful now      Chronic left shoulder pain. Improved   - Following with orthopedic, has been ongoing for the past 10 years.  States that he would likely need a shoulder surgery     Pt will f/u in 4 mos      Alejandra Hayes MD  11/6/2023  3:20 PM           Please advise.

## 2024-01-19 RX ORDER — ROSUVASTATIN CALCIUM 5 MG/1
5 TABLET, COATED ORAL NIGHTLY
Qty: 90 TABLET | Refills: 1 | Status: SHIPPED | OUTPATIENT
Start: 2024-01-19

## 2024-01-19 RX ORDER — METHOTREXATE 25 MG/ML
0.8 INJECTION, SOLUTION INTRA-ARTERIAL; INTRAMUSCULAR; INTRAVENOUS
Qty: 9.6 ML | Refills: 1 | Status: SHIPPED | OUTPATIENT
Start: 2024-01-19

## 2024-01-19 NOTE — TELEPHONE ENCOUNTER
Refill passed per [facility] protocol.  Requested Prescriptions   Pending Prescriptions Disp Refills    ROSUVASTATIN 5 MG Oral Tab [Pharmacy Med Name: ROSUVASTATIN TAB 5MG] 90 tablet 0     Sig: TAKE 1 TABLET NIGHTLY       Cholesterol Medication Protocol Passed - 1/18/2024  3:14 PM        Passed - ALT in past 12 months        Passed - LDL in past 12 months        Passed - Last ALT < 80     Lab Results   Component Value Date    ALT 50 (H) 11/06/2023             Passed - Last LDL < 130     Lab Results   Component Value Date    LDL 76 08/17/2023             Passed - In person appointment or virtual visit in the past 12 mos or appointment in next 3 mos     Recent Outpatient Visits              2 months ago Rheumatoid arthritis of multiple sites without rheumatoid factor (HCC)    [facility]    Office Visit    5 months ago Wellness examination    [facility], [address], [place] Priscila Robins MD    Office Visit    6 months ago Rheumatoid arthritis of multiple sites without rheumatoid factor (HCC)    [facility]    [facility]    10 months ago [facility]umatoid [facility] of multiple sites [facility] rheumatoid factor [facility]    [facility] Visit    1 year ago Essential hypertension    [facility], [address], [place]iscila Cohen MD    Office Visit          Future Appointments         Provider Department Appt Notes    In 1 month [facility]                  Future Appointments         Provider Department Appt Notes    In [facility]          Recent Outpatient Visits              2 months ago Rheumatoid arthritis of multiple sites without  rheumatoid factor (HCC)    [facility], Alejandra Paiz MD    Office Visit    5 months ago Wellness examination    [facility], [address], [place] Priscila Lew MD    Office Visit    6 months ago Rheumatoid arthritis of multiple sites without rheumatoid factor ([facility])    [facility], Alejandar Paiz MD    Office Visit    10 months ago Rheumatoid arthritis of multiple sites without rheumatoid factor ([facility])    [facility], Alejandra Paiz MD    Office Visit    1 year ago Essential hypertension    [facility], [address], [place] Priscila Lew MD    Office Visit

## 2024-02-05 ENCOUNTER — TELEPHONE (OUTPATIENT)
Dept: RHEUMATOLOGY | Facility: CLINIC | Age: 64
End: 2024-02-05

## 2024-02-05 NOTE — TELEPHONE ENCOUNTER
[facility] asking to clarify the quantity  9.6 mL was prescribed but medication only comes in 2 mL vials.     Provider Information    Authorizing Provider Encounter Provider   Alejandra Hayes MD Khan, Mariam, MD     Medication Detail    Medication Quantity Refills Start End   Methotrexate Sodium 50 MG/2ML Injection Solution 9.6 mL 1 1/19/2024 --   Sig:   Inject 0.8 mL into the skin every 7 days.     Route:   Subcutaneous     Order #:   [id number]

## 2024-02-05 NOTE — TELEPHONE ENCOUNTER
Phoned [facility]; spoke to Izabella, Pharmacist. She states Methotrexate comes in 2 ml mulitdose vial which needs to be disposed of 30 days after opening. Therefore they want to send pt 12 ml total for 3 month supply with 1 refill. States this is what has been sent to pt in the past. Approved this order.

## 2024-02-08 ENCOUNTER — PATIENT MESSAGE (OUTPATIENT)
Dept: RHEUMATOLOGY | Facility: CLINIC | Age: 64
End: 2024-02-08

## 2024-02-13 NOTE — TELEPHONE ENCOUNTER
From: Cody Savage  To: Alejandra Hayes  Sent: 2/8/2024 11:34 AM CST  Subject: Mauroira    Jordan Hayes,     I was just informed that under my insurance [employer] will no longer supply Humira for René. Spoke with [employer], [employer] and insurance company. Apparently at some point the pharmacy decides they are no longer going to use a particular drug and will pull it, then substitute with a biosimilar. My insurance company provided us with the 6 generic substitutions, all of which we have never heard of.    Idadoso, Hyrimoz, Yuflyma, Amjevita, Hulio, and Hadlima    Was notification about this sent your way? I don't know when his Humira will run out, that no one could tell me. We have serious reservations about using one of the above...effectiveness, side effects, , usage and availability as well as cost. Humira is very expensive, but I have to think that the above will be equally expensive especially if he goes from bi-monthly to weekly. I was told the generic must be the same administration type...pen injection, and should have the same drug makeup as Humira. I'm skeptical especially looking at the names of the substitutes. Humira has been a Godsend for René. I cannot watch him lose the ground he has made over the last 18 months. Cannot take any steps backwards. Was told if you felt one of the others would not be sufficient you could request an exception, but no guaranty. Would appreciate your thoughts, and if we need to come in for this conversation, please allow us to schedule as soon as possible.    Thank you.

## 2024-02-14 ENCOUNTER — TELEPHONE (OUTPATIENT)
Dept: RHEUMATOLOGY | Facility: CLINIC | Age: 64
End: 2024-02-14

## 2024-02-14 NOTE — TELEPHONE ENCOUNTER
PA started via [employer], awaiting form to upload.                   Alejandra Hayes MD Physician Signed3:32 PM     Copy     Yes okay to start PA for Hyrimoz

## 2024-02-15 NOTE — TELEPHONE ENCOUNTER
PA start    Prior authorization for: Hyrimoz    Medication form: 40 mg/0.4 ml pen     Submission method: Surescripts    Spoke with (if by phone):     Date submitted: 2/15/2024    Tracking #:    QF-TB result: Annual due    Component      Latest Ref Rng 6/13/2022   QUANTIFERON(R)-TB GOLD PLUS, 1 TUBE      NEGATIVE  NEGATIVE    NIL      IU/mL 0.02    MITOGEN-NIL      IU/mL >10.00    TB1-NIL      IU/mL 0.01    TB2-NIL      IU/mL 0.01

## 2024-02-19 NOTE — TELEPHONE ENCOUNTER
Response per insurance: Your PA request has been closed. Mock claim pays for Hyrimoz 60 days out, starting 4/1. Humira pays until 4/1. Closing as PA not processed - OD, Pharmacist 02/16/2024 11:02 PM    Should we date script to start 4/1/2024?

## 2024-02-20 ENCOUNTER — TELEPHONE (OUTPATIENT)
Dept: RHEUMATOLOGY | Facility: CLINIC | Age: 64
End: 2024-02-20

## 2024-02-20 RX ORDER — METHOTREXATE 25 MG/ML
0.8 INJECTION, SOLUTION INTRA-ARTERIAL; INTRAMUSCULAR; INTRAVENOUS
Qty: 9.6 ML | Refills: 1 | OUTPATIENT
Start: 2024-02-20

## 2024-02-20 RX ORDER — ADALIMUMAB-ADAZ 40 MG/.4ML
40 INJECTION, SOLUTION SUBCUTANEOUS
Qty: 1 ML | Refills: 5 | Status: SHIPPED | OUTPATIENT
Start: 2024-02-20

## 2024-02-20 RX ORDER — METHOTREXATE 25 MG/ML
0.8 INJECTION, SOLUTION INTRA-ARTERIAL; INTRAMUSCULAR; INTRAVENOUS
Qty: 9.6 ML | Refills: 1 | Status: SHIPPED | OUTPATIENT
Start: 2024-02-20

## 2024-02-20 NOTE — TELEPHONE ENCOUNTER
Current Outpatient Medications   Medication Sig Dispense Refill    Methotrexate Sodium 50 MG/2ML Injection Solution Inject 0.8 mL into the skin every 7 days. 9.6 mL 1

## 2024-02-20 NOTE — TELEPHONE ENCOUNTER
Name and  verified. Explain to patient biosimilar or \"generic\" version of Humira has been sent to [facility]. This change should take effect 24 per insurance. Advised to look up Hyrimoz online for pen differences and co-pay card. Patient and spouse verbalized understanding.

## 2024-02-23 NOTE — TELEPHONE ENCOUNTER
Refill pended.      LOV: 03/15/2023  Future Appointments   Date Time Provider Red Cochran   7/19/2023  3:00 PM Staci Kelley MD [facility]   LABS:  Component      Latest Ref Rng 12/20/2022 1/25/2023   WBC      4.0 - 11.0 x10(3) uL 8.6     RBC      4.30 - 5.70 x10(6)uL 4.12 (L)     Hemoglobin      13.0 - 17.5 g/dL 13.7     Hematocrit      39.0 - 53.0 % 41.0     MCV      80.0 - 100.0 fL 99.5     MCH      26.0 - 34.0 pg 33.3     MCHC      31.0 - 37.0 g/dL 33.4     RDW-SD      35.1 - 46.3 fL 58.3 (H)     RDW      11.0 - 15.0 % 15.9 (H)     Platelet Count      922.7 - 450.0 10(3)uL 400.0     Prelim Neutrophil Abs      1.50 - 7.70 x10 (3) uL 5.02     Neutrophils Absolute      1.50 - 7.70 x10(3) uL 5.02     Lymphocytes Absolute      1.00 - 4.00 x10(3) uL 2.13     Monocytes Absolute      0.10 - 1.00 x10(3) uL 1.02 (H)     Eosinophils Absolute      0.00 - 0.70 x10(3) uL 0.28     Basophils Absolute      0.00 - 0.20 x10(3) uL 0.06     Immature Granulocyte Absolute      0.00 - 1.00 x10(3) uL 0.04     Neutrophils %      % 58.7     Lymphocytes %      % 24.9     Monocytes %      % 11.9     Eosinophils %      % 3.3     Basophils %      % 0.7     Immature Granulocyte %      % 0.5     Glucose      65 - 99 mg/dL  97    BUN      7 - 25 mg/dL  30 (H)    CREATININE      0.70 - 1.35 mg/dL 1.05  1.25    EGFR      > OR = 60 mL/min/1.73m2  64    BUN/CREATININE RATIO      6 - 22 (calc)  24 (H)    Sodium      135 - 146 mmol/L  135    Potassium      3.5 - 5.3 mmol/L  4.6    Chloride      98 - 110 mmol/L  100    Carbon Dioxide, Total      20 - 32 mmol/L  29    CALCIUM      8.6 - 10.3 mg/dL  9.4    PROTEIN, TOTAL      6.1 - 8.1 g/dL  7.2    Albumin      3.6 - 5.1 g/dL  4.3    Globulin      1.9 - 3.7 g/dL (calc)  2.9    A/G Ratio      1.0 - 2.5 (calc)  1.5    Total Bilirubin      0.2 - 1.2 mg/dL  0.8    Alkaline Phosphatase      35 - 144 U/L  72    AST (SGOT)      10 - 35 U/L 30  23    ALT (SGPT)      9 - 46 U/L 52  30    eGFR-Cr >=60 mL/min/1.73m2 79     SED RATE      0 - 20 mm/Hr 54 (H)     C-REACTIVE PROTEIN      <0.30 mg/dL 1.14 (H)        Legend:  (L) Low  (H) High No.

## 2024-03-16 DIAGNOSIS — N52.9 ERECTILE DYSFUNCTION, UNSPECIFIED ERECTILE DYSFUNCTION TYPE: ICD-10-CM

## 2024-03-18 ENCOUNTER — LAB ENCOUNTER (OUTPATIENT)
Dept: LAB | Facility: HOSPITAL | Age: 64
End: 2024-03-18
Attending: INTERNAL MEDICINE
Payer: COMMERCIAL

## 2024-03-18 ENCOUNTER — OFFICE VISIT (OUTPATIENT)
Dept: RHEUMATOLOGY | Facility: CLINIC | Age: 64
End: 2024-03-18

## 2024-03-18 VITALS
WEIGHT: 242 LBS | HEIGHT: 70 IN | HEART RATE: 66 BPM | BODY MASS INDEX: 34.65 KG/M2 | DIASTOLIC BLOOD PRESSURE: 71 MMHG | SYSTOLIC BLOOD PRESSURE: 138 MMHG

## 2024-03-18 DIAGNOSIS — Z51.81 MEDICATION MONITORING ENCOUNTER: ICD-10-CM

## 2024-03-18 DIAGNOSIS — M06.09 RHEUMATOID ARTHRITIS OF MULTIPLE SITES WITHOUT RHEUMATOID FACTOR (HCC): ICD-10-CM

## 2024-03-18 DIAGNOSIS — M06.09 RHEUMATOID ARTHRITIS OF MULTIPLE SITES WITHOUT RHEUMATOID FACTOR (HCC): Primary | ICD-10-CM

## 2024-03-18 LAB
ALBUMIN SERPL-MCNC: 4.5 G/DL (ref 3.2–4.8)
ALT SERPL-CCNC: 62 U/L
AST SERPL-CCNC: 45 U/L (ref ?–34)
BASOPHILS # BLD AUTO: 0.06 X10(3) UL (ref 0–0.2)
BASOPHILS NFR BLD AUTO: 0.6 %
CREAT BLD-MCNC: 1.26 MG/DL
CRP SERPL-MCNC: 1 MG/DL (ref ?–1)
DEPRECATED RDW RBC AUTO: 55.9 FL (ref 35.1–46.3)
EGFRCR SERPLBLD CKD-EPI 2021: 63 ML/MIN/1.73M2 (ref 60–?)
EOSINOPHIL # BLD AUTO: 0.22 X10(3) UL (ref 0–0.7)
EOSINOPHIL NFR BLD AUTO: 2.4 %
ERYTHROCYTE [DISTWIDTH] IN BLOOD BY AUTOMATED COUNT: 15.3 % (ref 11–15)
ERYTHROCYTE [SEDIMENTATION RATE] IN BLOOD: 35 MM/HR
HCT VFR BLD AUTO: 42.9 %
HGB BLD-MCNC: 14.1 G/DL
IMM GRANULOCYTES # BLD AUTO: 0.03 X10(3) UL (ref 0–1)
IMM GRANULOCYTES NFR BLD: 0.3 %
LYMPHOCYTES # BLD AUTO: 2.36 X10(3) UL (ref 1–4)
LYMPHOCYTES NFR BLD AUTO: 25.3 %
MCH RBC QN AUTO: 33 PG (ref 26–34)
MCHC RBC AUTO-ENTMCNC: 32.9 G/DL (ref 31–37)
MCV RBC AUTO: 100.5 FL
MONOCYTES # BLD AUTO: 1.16 X10(3) UL (ref 0.1–1)
MONOCYTES NFR BLD AUTO: 12.4 %
NEUTROPHILS # BLD AUTO: 5.5 X10 (3) UL (ref 1.5–7.7)
NEUTROPHILS # BLD AUTO: 5.5 X10(3) UL (ref 1.5–7.7)
NEUTROPHILS NFR BLD AUTO: 59 %
PLATELET # BLD AUTO: 336 10(3)UL (ref 150–450)
RBC # BLD AUTO: 4.27 X10(6)UL
WBC # BLD AUTO: 9.3 X10(3) UL (ref 4–11)

## 2024-03-18 PROCEDURE — 99214 OFFICE O/P EST MOD 30 MIN: CPT | Performed by: INTERNAL MEDICINE

## 2024-03-18 PROCEDURE — 85025 COMPLETE CBC W/AUTO DIFF WBC: CPT

## 2024-03-18 PROCEDURE — 84460 ALANINE AMINO (ALT) (SGPT): CPT

## 2024-03-18 PROCEDURE — 82040 ASSAY OF SERUM ALBUMIN: CPT

## 2024-03-18 PROCEDURE — 36415 COLL VENOUS BLD VENIPUNCTURE: CPT

## 2024-03-18 PROCEDURE — 86140 C-REACTIVE PROTEIN: CPT

## 2024-03-18 PROCEDURE — 84450 TRANSFERASE (AST) (SGOT): CPT

## 2024-03-18 PROCEDURE — 85652 RBC SED RATE AUTOMATED: CPT

## 2024-03-18 PROCEDURE — 82565 ASSAY OF CREATININE: CPT

## 2024-03-18 NOTE — PROGRESS NOTES
Cody Savage is a 64 year old male.    HPI:     Chief Complaint   Patient presents with    Rheumatoid Arthritis       I had the pleasure of seeing Cody Savage on 3/18/2024 for follow up Seronegative RA/Spondyloarthritis.     Current medications:  Humira every 2 weeks- started 8/11/2022  Methotrexate 0.4 mL injections weekly- started 8/9/2022  Previous medications:  Prednisone- end July 2022-11/2022  Blood work:  Neg VALENCIA, RF, CCP, HLA-B27, CK  , >130    Interval History:  This is a 61 yo M with hx of HTN, B/L TKR (Dec 2021 and March 2022), L hand Carpal tunnel release (April 2022) presents with polyarthralgias.  About 1 year ago around June 2021 he was doing really well.  Had very minimal joint pain.  In June 2021 they sold their house at that time started having worsening joint pain in his knees.  Mostly his left knee.  X-rays of the knees showed significant medial compartment narrowing in the left knee and moderate medial compartment in the right knee.  He then had his left knee replaced.  Overall did pretty good.  In March he had his right knee replaced and since then has had worsening joint pain overall.  He fell out of bed and dislocated his right shoulder in March, he had a shoulder reduction but was seen by orthopedic and they are recommending surgery.  He has chronic left shoulder pain from wear-and-tear from his job.  He worked as a  for his entire career.  He is also supposed to be having left shoulder surgery.  He then started to have worsening pain in his hands.  He had swelling in his right wrist.  Swelling in his right wrist has been since December 2021.  He also had left elbow pain and swelling.  He received a cortisone shot in his right wrist and left elbow in April 2022.  It did help some of his symptoms.  He had some numbness and tingling in his hands also.  He had an EMG done around April 2022 and per PCPs note he was diagnosed with neuropathy in both upper extremities.  He  then had left carpal tunnel release surgery in April 2022.  The most recent joint pain has been his right wrist and left elbow.  Blood work in April 2022 did show elevated , repeat in May was >130.  Denies any history of psoriasis, temporal pain, headache, jaw pain or blurry vision  Reviewing his right knee synovial biopsy from March 2022, findings are suggestive of rheumatoid arthritis    6/29/20022:  Presents for f/u of Seronegative RA  Recent blood work showed ESR>130 and CRP 15.1 mg/dL  Negative RF, CCP and HLA-B27  X-ray of the right wrist does show marked joint space narrowing and 2 erosions on the lateral cortex of the scaphoid, erosive changes on the ulnar styloid  Has been on prednisone for about 2 weeks now, on prednisone 10 mg daily  Now  Joints feel 40% better. Has some mild pain in the hands and also some numbness in the L hand. R hand is doing better, mild pain in the wrist   Shoulder have chronic pain, r shoulder abduction limited to 30 degrees and left shoulder abduction limited to 90 degrees  Knees are doing better, can walk a mile   Also in PT twice a week for the knees and the shoulders  Had a cortisone shot in R shoulder last week and helped it a lot     8/8/2022:  Presents for f/u of Seronegative RA  Recent blood work showed ESR>130 and CRP 15.1 mg/dL  Negative RF, CCP and HLA-B27  X-ray of the right wrist does show marked joint space narrowing and 2 erosions on the lateral cortex of the scaphoid, erosive changes on the ulnar styloid  Remains on prednisone 10 mg daily, decreased to 5 mg but had more s/e  Having a lot of joint pain and swelling and difficulty ambulating due to his pain  Reports swelling in both hands, fingers, right wrist and right ankle.  Also having pain in both knees  Patient wife states that his symptoms have worsened significantly    9/20/2022:   Presents for follow-up of seronegative RA/spondylarthritis  He was started on Humira and methotrexate back in August.  He  also remains on prednisone 10 mg daily  Joint pain has significantly improved  He is able to do his own ADLs.  Can walk 5000 steps a day.  Hands are better.   Wife states that during his last visit he was not able to do anything for himself, not able to do his own ADLs  States that he can make a fist now.  Right ankle swelling improved  Seen by orthopedic and planning on having his shoulder surgery at the end of November 12/20/2022:   Presents for follow-up of seronegative RA/spondylarthritis  Currently on methotrexate 8 pills weekly and Humira every 2 weeks  Has been off of prednisone now since November  When tapered off prednisone has some worsening joint pain for about 1 week and then joints got better.  Having some pain and swelling in the ankles, R is worse than L  Ankles are more swollen, less pain. When wake up in AM no swelling in ankles but as days goes on swelling worsens.  He was on a diuretic, chlorthalidone but stopped a month ago. prescription ran out and was not renew.   Has been really active, walking 3 times a week. Also has been golfing and bowling.  Has been getting more nauseus and has been having some headaches.     3/15/2023:   Presents for follow-up of seronegative RA/spondylarthritis  Currently on methotrexate 0.8 mL injecitons weekly and Humira every 2 weeks  Was switched from oral to injection methotrexate last visit  Having some clicking and popping in ankles and hips. mostly the left hip going up to the left gluteal region  Also working out at gym 4 days a week, walks for 30 min and some weights. Some soreness in the left elbow  r shoulder is doing better, less pain. Able to raise it now  Hands and wrist doing good, can make a fist    7/19/2023:   Presents for follow-up of seronegative RA/spondylarthritis  Currently on methotrexate 0.8 mL injecitons weekly and Humira every 2 weeks  Has been bike riding, 17 miles and doing well  Minimal joint pain  Doing well overall    11/6/2023:    Presents for follow-up of seronegative RA/spondylarthritis  Currently on methotrexate 0.6 mL injecitons weekly and Humira every 2 weeks  Joints are pretty stable  Continues to stay active, biked 20 miles yesterday.   R shoulder is good some mild pain in the left shoulder    3/18/2024:   Presents for follow-up of seronegative RA/spondylarthritis  Currently on methotrexate 0.4 mL injecitons weekly and Humira every 2 weeks, he will be transitioning to Hyrimoz every 2 weeks  Joints are pretty stable  Continues to stay active, continues to bike  R shoulder is good some mild pain in the left shoulder        HISTORY:  Past Medical History:   Diagnosis Date    High cholesterol     Osteoarthritis       Social Hx Reviewed   Family Hx Reviewed     Medications (Active prior to today's visit):  Current Outpatient Medications   Medication Sig Dispense Refill    Adalimumab-adaz (HYRIMOZ) 40 MG/0.4ML Subcutaneous Solution Auto-injector Inject 40 mg into the skin every 14 (fourteen) days. 1 mL 5    Methotrexate Sodium 50 MG/2ML Injection Solution Inject 0.8 mL into the skin every 7 days. 9.6 mL 1    rosuvastatin 5 MG Oral Tab Take 1 tablet (5 mg total) by mouth nightly. 90 tablet 1    losartan 25 MG Oral Tab Take 0.5 tablets (12.5 mg total) by mouth daily. 45 tablet 3    Sildenafil Citrate (VIAGRA) 100 MG Oral Tab Take 1 tablet (100 mg total) by mouth daily as needed for Erectile Dysfunction. 90 tablet 0    sodium chloride 0.65 % Nasal Solution 1 spray by Nasal route as needed for congestion. (Patient not taking: Reported on 11/6/2023) 30 mL 0    chlorthalidone 25 MG Oral Tab Take 1 tablet (25 mg total) by mouth daily. 90 tablet 3    Insulin Syringe 30G X 5/16\" 1 ML Does not apply Misc Patient will inject methotrexate subcutaneously 0.8 mL every 7 days 4 each 2    folic acid 1 MG Oral Tab Take 1 tablet (1 mg total) by mouth daily. 90 tablet 3     .cmed  Allergies:  No Known Allergies      ROS:   All other ROS are negative.      PHYSICAL EXAM:   GEN: AAOx3, NAD  HEENT: EOMI, PERRLA, no injection or icterus, oral mucosa moist, no oral lesions. No lymphadenopathy. No facial rash  CVS: RRR, no murmurs rubs or gallops. Equal 2+ distal pulses.   LUNGS: CTAB, no increased work of breathing  ABDOMEN:  soft NT/ND, +BS, no HSM  SKIN: No rashes or skin lesions. No nail findings  MSK:  Cervical spine: FROM  Hands: Swelling improved, has happened Nicky's nodes.  Able to make a full fist  Wrist: no swelling now  Elbow: FROM, no pain or swelling or warmth on palpation  Shoulders: Bilateral shoulder abduction now limited to 100 degrees  Hip: normal log roll, no lateral hip pain, JOSE ALBERTO test negative b/l  Knees: FROM, no warmth or effusion present. No pain with ROM.   Ankles: R ankle no swelling  Feet: no pain with MTP squeeze, no toe swelling or pain or warmth on palpation with FROM  Spine: no lumbar or sacral pain on palpation.  EXT: 1+edema in LE   NEURO: Cranial nerves II-XII intact grossly. 5/5 strength throughout in both upper and lower extremities, sensation intact.  PSYCH: normal mood       LABS:     Component      Latest Ref Rng & Units 6/13/2022   QUANTIFERON(R)-TB GOLD PLUS, 1 TUBE      NEGATIVE NEGATIVE   NIL      IU/mL 0.02   MITOGEN-NIL      IU/mL >10.00   TB1-NIL      IU/mL 0.01   TB2-NIL      IU/mL 0.01   HCV AB      NON-REACTIVE NON-REACTIVE   SIGNAL TO CUT-OFF      <1.00 0.21   C-REACTIVE PROTEIN      <8.0 mg/L 151.8 (H)   CYCLIC CITRULLINATED$PEPTIDE (CCP) AB (IGG)      UNITS <16   SED RATE BY MODIFIED$WESTERGREN      < OR = 20 mm/h >130 (H)   HLA-B27 ANTIGEN      NEGATIVE NEGATIVE   HBSAg Screen      NON-REACTIVE NON-REACTIVE   HEPATITIS B SURFACE$ANTIBODY QL      NON-REACTIVE REACTIVE (A)   HEPATITIS B CORE AB TOTAL      NON-REACTIVE NON-REACTIVE     Imaging:     XR R wrist:  BONES: There is mild narrowing of the radial scaphoid joint space.  There is marked narrowing of the joint space between the lunate and capitate with  associated subchondral sclerosis.  Two erosions project along the lateral cortex of the scaphoid   measuring 3 mm.  There is a 4 mm erosion along the medial triquetrum.  There is erosive change along the tip of the ulnar styloid.   SOFT TISSUES: Negative. No visible soft tissue swelling.   EFFUSION: None visible.   OTHER: Negative.     ASSESSMENT/PLAN:     Seronegative RA/spondylarthritis- improved  - X-ray of the right wrist showed evidence of marked narrowing with erosions  - Also right knee synovial biopsy in March 2022 was suggestive of rheumatoid arthritis  - Elevated ESR and CRP  - Currently on Humira every 2 weeks, methotrexate 0.4 ml Subcut injection weekly and folic acid daily. He will be switching to Hyromiz every 2 weeks starting April   - Joint pain is improved significantly on this regimen  - Blood work every today and every 3 mos     Bilateral knee replacement secondary to OA  - Again right knee synovial biopsy did show evidence of inflammation, possible suggestive of RA     Left carpal tunnel release- stable  - Symptoms are stable.  Continues to have some numbness and tingling  - will use wrist splint  Right shoulder dislocation- improved, stable   - He dislocated his right shoulder back in March 2022, does not want surgery now, doing well   - ROM has improved, less painful now   Chronic left shoulder pain. Improved   - Following with orthopedic, has been ongoing for the past 10 years.    Pt will f/u in 4-5 mos     Alejandra Hayes MD  3/18/2024  3:05 PM

## 2024-03-19 RX ORDER — SILDENAFIL 100 MG/1
100 TABLET, FILM COATED ORAL DAILY PRN
Qty: 18 TABLET | Refills: 0 | Status: SHIPPED | OUTPATIENT
Start: 2024-03-19

## 2024-03-19 NOTE — TELEPHONE ENCOUNTER
Refill passed per [facility] protocol.  Requested Prescriptions   Pending Prescriptions Disp Refills    SILDENAFIL CITRATE 100 MG Oral Tab [Pharmacy Med Name: SILDENAFIL TAB 100MG] 18 tablet 0     Sig: TAKE 1 TABLET DAILY AS     NEEDED FOR ERECTILE        DYSFUNCTION       Genitourinary Medications Passed - 3/16/2024 10:09 AM        Passed - Patient does not have pulmonary hypertension on problem list        Passed - In person appointment or virtual visit in the past 12 mos or appointment in next 3 mos     Recent Outpatient Visits              Yesterday Rheumatoid [facility] of [facility] sites without rheumatoid factor [facility]    [facility]    4 months ago Rheumatoid arthritis of multiple sites without rheumatoid factor ([facility]CC)    [facility]    [facility]    7 months ago Wellness examination    [facility] [address], [place] Priscila Robins MD    Office Visit    8 months ago Rheumatoid arthritis of multiple sites without rheumatoid factor (HCC)    [facility]    [facility]    1 year ago Rheumatoid arthritis of multiple sites without rheumatoid factor ([facility]          Future Appointments         Provider Department Appt Notes    In 5 months [facility]                  Recent Outpatient Visits              Yesterday Rheumatoid arthritis of multiple sites without rheumatoid factor (HCC)    [facility]    [facility]    4 months ago Rheumatoid arthritis of multiple sites without rheumatoid factor ([facility]    [facility]  [facility]    7 months ago Wellness examination    [facility], [address], [place] Priscila Lew MD    Office Visit    8 months ago Rheumatoid arthritis of multiple sites without rheumatoid factor (HCC)    [facility]    Office Visit    1 year ago Rheumatoid arthritis of multiple sites without rheumatoid factor (HCC)    [facility]    Office Visit          Future Appointments         Provider Department Appt Notes    In 5 months Alejandra Hayes MD [facility] 5 mo follow up

## 2024-03-25 ENCOUNTER — TELEPHONE (OUTPATIENT)
Dept: RHEUMATOLOGY | Facility: CLINIC | Age: 64
End: 2024-03-25

## 2024-03-25 DIAGNOSIS — R79.89 ELEVATED LFTS: Primary | ICD-10-CM

## 2024-04-24 ENCOUNTER — LAB ENCOUNTER (OUTPATIENT)
Dept: LAB | Age: 64
End: 2024-04-24
Attending: INTERNAL MEDICINE
Payer: COMMERCIAL

## 2024-04-24 DIAGNOSIS — M06.09 RHEUMATOID ARTHRITIS OF MULTIPLE SITES WITHOUT RHEUMATOID FACTOR (HCC): ICD-10-CM

## 2024-04-24 DIAGNOSIS — Z51.81 MEDICATION MONITORING ENCOUNTER: ICD-10-CM

## 2024-04-24 LAB
ALBUMIN SERPL-MCNC: 4.5 G/DL (ref 3.2–4.8)
ALP LIVER SERPL-CCNC: 64 U/L
ALT SERPL-CCNC: 74 U/L
AST SERPL-CCNC: 43 U/L (ref ?–34)
BASOPHILS # BLD AUTO: 0.06 X10(3) UL (ref 0–0.2)
BASOPHILS NFR BLD AUTO: 0.6 %
BILIRUB DIRECT SERPL-MCNC: 0.4 MG/DL (ref ?–0.3)
BILIRUB SERPL-MCNC: 1 MG/DL (ref 0.2–1.1)
CREAT BLD-MCNC: 1.26 MG/DL
CRP SERPL-MCNC: 0.5 MG/DL (ref ?–1)
DEPRECATED RDW RBC AUTO: 54.4 FL (ref 35.1–46.3)
EGFRCR SERPLBLD CKD-EPI 2021: 63 ML/MIN/1.73M2 (ref 60–?)
EOSINOPHIL # BLD AUTO: 0.21 X10(3) UL (ref 0–0.7)
EOSINOPHIL NFR BLD AUTO: 2.2 %
ERYTHROCYTE [DISTWIDTH] IN BLOOD BY AUTOMATED COUNT: 14.8 % (ref 11–15)
ERYTHROCYTE [SEDIMENTATION RATE] IN BLOOD: 10 MM/HR
HCT VFR BLD AUTO: 42.2 %
HGB BLD-MCNC: 14.1 G/DL
IMM GRANULOCYTES # BLD AUTO: 0.02 X10(3) UL (ref 0–1)
IMM GRANULOCYTES NFR BLD: 0.2 %
LYMPHOCYTES # BLD AUTO: 2.87 X10(3) UL (ref 1–4)
LYMPHOCYTES NFR BLD AUTO: 30.3 %
MCH RBC QN AUTO: 33.4 PG (ref 26–34)
MCHC RBC AUTO-ENTMCNC: 33.4 G/DL (ref 31–37)
MCV RBC AUTO: 100 FL
MONOCYTES # BLD AUTO: 0.92 X10(3) UL (ref 0.1–1)
MONOCYTES NFR BLD AUTO: 9.7 %
NEUTROPHILS # BLD AUTO: 5.38 X10 (3) UL (ref 1.5–7.7)
NEUTROPHILS # BLD AUTO: 5.38 X10(3) UL (ref 1.5–7.7)
NEUTROPHILS NFR BLD AUTO: 57 %
PLATELET # BLD AUTO: 352 10(3)UL (ref 150–450)
PROT SERPL-MCNC: 7.4 G/DL (ref 5.7–8.2)
RBC # BLD AUTO: 4.22 X10(6)UL
WBC # BLD AUTO: 9.5 X10(3) UL (ref 4–11)

## 2024-04-24 PROCEDURE — 85652 RBC SED RATE AUTOMATED: CPT

## 2024-04-24 PROCEDURE — 36415 COLL VENOUS BLD VENIPUNCTURE: CPT | Performed by: INTERNAL MEDICINE

## 2024-04-24 PROCEDURE — 85025 COMPLETE CBC W/AUTO DIFF WBC: CPT

## 2024-04-24 PROCEDURE — 86140 C-REACTIVE PROTEIN: CPT

## 2024-04-24 PROCEDURE — 82565 ASSAY OF CREATININE: CPT

## 2024-04-24 PROCEDURE — 80076 HEPATIC FUNCTION PANEL: CPT | Performed by: INTERNAL MEDICINE

## 2024-04-28 NOTE — PATIENT INSTRUCTIONS
You were seen today for rheumatoid arthritis  Continue methotrexate, Humira and folic acid  Blood work in May  Most recent blood work showed normal kidney and liver tests
actual

## 2024-05-03 DIAGNOSIS — I10 ESSENTIAL HYPERTENSION: ICD-10-CM

## 2024-05-05 RX ORDER — CHLORTHALIDONE 25 MG/1
25 TABLET ORAL DAILY
Qty: 90 TABLET | Refills: 1 | Status: SHIPPED | OUTPATIENT
Start: 2024-05-05

## 2024-05-05 RX ORDER — ROSUVASTATIN CALCIUM 5 MG/1
5 TABLET, COATED ORAL NIGHTLY
Qty: 90 TABLET | Refills: 1 | Status: SHIPPED | OUTPATIENT
Start: 2024-05-05

## 2024-05-05 NOTE — TELEPHONE ENCOUNTER
Refill passed per [facility] protocol.     Requested Prescriptions   Pending Prescriptions Disp Refills    CHLORTHALIDONE 25 MG Oral Tab [Pharmacy Med Name: CHLORTHALID  TAB 25MG] 90 tablet 3     Sig: TAKE 1 TABLET DAILY       Hypertension Medications Protocol Passed - 5/3/2024 10:42 AM        Passed - CMP or BMP in past 12 months        Passed - Last BP reading less than 140/90     BP Readings from Last 1 Encounters:   03/18/24 138/71               Passed - In person appointment or virtual visit in the past 12 mos or appointment in next 3 mos     Recent Outpatient Visits              1 month ago Rheumatoid arthritis of multiple sites without rheumatoid factor (HCC)    [facility]    [facility]    6 months ago Rheumatoid arthritis of multiple sites without rheumatoid factor ([facility]    [facility] Visit    9 months ago Wellness examination    [facility], [address], [place] Priscila Lew MD    Office Visit    9 months ago Rheumatoid arthritis of multiple sites without rheumatoid factor ([facility]    [facility] Visit    1 year ago Rheumatoid arthritis of multiple sites without rheumatoid factor ([facility])    [facility]    [facility]          Future Appointments         Provider Department Appt Notes    In 3 months [facility]                    Passed - EGFRCR or GFRNAA > 50     GFR Evaluation  EGFRCR: 63 , resulted on 4/24/2024            ROSUVASTATIN 5 MG Oral Tab [Pharmacy Med Name: ROSUVASTATIN TAB 5MG] 90 tablet 1     Sig: TAKE 1 TABLET NIGHTLY       Cholesterol Medication Protocol Passed - 5/3/2024 10:42 AM        Passed - ALT < 80     Lab Results   Component  Value Date    ALT 74 (H) 04/24/2024             Passed - ALT resulted within past year        Passed - Lipid panel within past 12 months     Lab Results   Component Value Date    CHOLEST 139 08/17/2023    TRIG 55 08/17/2023    HDL 49 08/17/2023    LDL 76 08/17/2023    TCHDLRATIO 2.8 08/17/2023    NONHDLC 90 08/17/2023             Passed - In person appointment or virtual visit in the past 12 mos or appointment in next 3 mos     Recent Outpatient Visits              1 month ago Rheumatoid arthritis of multiple sites without rheumatoid factor (HCC)    [facility]    [facility]    6 months ago Rheumatoid arthritis of multiple sites without rheumatoid factor (HCC)    [facility]    [facility] Visit    9 months ago Wellness examination    [facility], [address], [place] Priscila Robins MD    Office Visit    9 months ago Rheumatoid arthritis of multiple sites without rheumatoid factor (HCC)    [facility]    [facility] Visit    1 year ago Rheumatoid arthritis of multiple sites without rheumatoid factor (HCC)    [facility]    [facility]          Future Appointments         Provider Department Appt Notes    In 3 months [facility]                          Recent Outpatient Visits              1 month ago Rheumatoid arthritis of multiple sites without rheumatoid factor (HCC)    [facility]    [facility] Visit    6 months ago Rheumatoid arthritis of multiple sites without rheumatoid factor (HCC)    [facility]    [facility] Visit    9 months ago Wellness examination    [facility] [address], [place] Priscila Robins MD    Office Visit    9 months ago Rheumatoid arthritis of multiple sites without rheumatoid factor (HCC)    [facility]    Office Visit    1 year ago Rheumatoid arthritis of multiple sites without rheumatoid factor (HCC)    [facility]    [facility]             Future Appointments         Provider Department Appt Notes    In 3 months [facility]

## 2024-06-20 ENCOUNTER — TELEPHONE (OUTPATIENT)
Dept: RHEUMATOLOGY | Facility: CLINIC | Age: 64
End: 2024-06-20

## 2024-06-20 DIAGNOSIS — M06.09 RHEUMATOID ARTHRITIS OF MULTIPLE SITES WITHOUT RHEUMATOID FACTOR (HCC): Primary | ICD-10-CM

## 2024-06-20 DIAGNOSIS — Z51.81 MEDICATION MONITORING ENCOUNTER: ICD-10-CM

## 2024-06-20 NOTE — TELEPHONE ENCOUNTER
PA start - Reauthorization    Prior authorization for: Hyrimoz    Medication form: 40mg pen    Submission method: Forms completed and faxed back to [facility] along with clinical notes    Spoke with (if by phone):     Date submitted: 6/20/2024    Tracking #:    QF-TB result:     6/13/22  9:22 AM    QUANTIFERON(R)-TB GOLD PLUS, 1 TUBE  NEGATIVE NEGATIVE

## 2024-06-21 NOTE — TELEPHONE ENCOUNTER
PA Approved - no order needed at this time    Prior authorization for: Hyrimoz    Medication form:40mg pen    Date received: 6/21/2024    Approval #: [employer] [id number]    Approved dates:6/21/2024 - 6/21/2025    Pharmacy for medication:[facility]    QF-TB results: Annual order placed    6/13/22  9:22 AM     QUANTIFERON(R)-TB GOLD PLUS, 1 TUBE  NEGATIVE NEGATIVE

## 2024-07-07 DIAGNOSIS — M06.09 RHEUMATOID ARTHRITIS OF MULTIPLE SITES WITHOUT RHEUMATOID FACTOR (HCC): ICD-10-CM

## 2024-07-07 DIAGNOSIS — Z51.81 MEDICATION MONITORING ENCOUNTER: ICD-10-CM

## 2024-07-08 NOTE — TELEPHONE ENCOUNTER
LOV: 3/18/24  Last Refilled:#90, 3rfs 5/1/23    ASSESSMENT/PLAN:      Seronegative RA/spondylarthritis- improved  - X-ray of the right wrist showed evidence of marked narrowing with erosions  - Also right knee synovial biopsy in March 2022 was suggestive of rheumatoid arthritis  - Elevated ESR and CRP  - Currently on Humira every 2 weeks, methotrexate 0.4 ml Subcut injection weekly and folic acid daily. He will be switching to Hyromiz every 2 weeks starting April   - Joint pain is improved significantly on this regimen  - Blood work every today and every 3 mos      Bilateral knee replacement secondary to OA  - Again right knee synovial biopsy did show evidence of inflammation, possible suggestive of RA     Left carpal tunnel release- stable  - Symptoms are stable.  Continues to have some numbness and tingling  - will use wrist splint  Right shoulder dislocation- improved, stable   - He dislocated his right shoulder back in March 2022, does not want surgery now, doing well   - ROM has improved, less painful now   Chronic left shoulder pain. Improved   - Following with orthopedic, has been ongoing for the past 10 years.     Pt will f/u in 4-5 mos      Alejandra Hayes MD  3/18/2024  3:05 PM              Please advise.

## 2024-07-09 RX ORDER — FOLIC ACID 1 MG/1
1 TABLET ORAL DAILY
Qty: 90 TABLET | Refills: 3 | Status: SHIPPED | OUTPATIENT
Start: 2024-07-09

## 2024-07-10 ENCOUNTER — PATIENT MESSAGE (OUTPATIENT)
Dept: RHEUMATOLOGY | Facility: CLINIC | Age: 64
End: 2024-07-10

## 2024-07-11 NOTE — TELEPHONE ENCOUNTER
From: Cody Savage  To: Alejandra Hayes  Sent: 7/10/2024 6:15 PM CDT  Subject: Blood Tests for René Rodriguez, hope you are doing well.     We didn't leave for the beach as planned. I had an urgent medical issue come up and had to delay. We leave the 14th returning August 10th.    Both of us were a little preoccupied with me that we didn't think to ask sooner. Are René's blood tests a standing order? Is it possible he can go into the facility here in [place] tomorrow or Friday and have them drawn? He is very curious to see if his liver numbers have come down or what they have done.    Thank you.

## 2024-07-12 ENCOUNTER — LAB ENCOUNTER (OUTPATIENT)
Dept: LAB | Age: 64
End: 2024-07-12
Attending: INTERNAL MEDICINE
Payer: COMMERCIAL

## 2024-07-12 DIAGNOSIS — Z51.81 MEDICATION MONITORING ENCOUNTER: ICD-10-CM

## 2024-07-12 DIAGNOSIS — M06.09 RHEUMATOID ARTHRITIS OF MULTIPLE SITES WITHOUT RHEUMATOID FACTOR (HCC): ICD-10-CM

## 2024-07-12 LAB
ALBUMIN SERPL-MCNC: 4.6 G/DL (ref 3.2–4.8)
ALT SERPL-CCNC: 61 U/L
AST SERPL-CCNC: 40 U/L (ref ?–34)
BASOPHILS # BLD AUTO: 0.08 X10(3) UL (ref 0–0.2)
BASOPHILS NFR BLD AUTO: 0.7 %
CREAT BLD-MCNC: 1.41 MG/DL
CRP SERPL-MCNC: 1.1 MG/DL (ref ?–1)
DEPRECATED RDW RBC AUTO: 50.7 FL (ref 35.1–46.3)
EGFRCR SERPLBLD CKD-EPI 2021: 55 ML/MIN/1.73M2 (ref 60–?)
EOSINOPHIL # BLD AUTO: 0.17 X10(3) UL (ref 0–0.7)
EOSINOPHIL NFR BLD AUTO: 1.5 %
ERYTHROCYTE [DISTWIDTH] IN BLOOD BY AUTOMATED COUNT: 14.6 % (ref 11–15)
ERYTHROCYTE [SEDIMENTATION RATE] IN BLOOD: 22 MM/HR
HCT VFR BLD AUTO: 41.3 %
HGB BLD-MCNC: 14.6 G/DL
IMM GRANULOCYTES # BLD AUTO: 0.03 X10(3) UL (ref 0–1)
IMM GRANULOCYTES NFR BLD: 0.3 %
LYMPHOCYTES # BLD AUTO: 2.6 X10(3) UL (ref 1–4)
LYMPHOCYTES NFR BLD AUTO: 22.5 %
MCH RBC QN AUTO: 34.3 PG (ref 26–34)
MCHC RBC AUTO-ENTMCNC: 35.4 G/DL (ref 31–37)
MCV RBC AUTO: 96.9 FL
MONOCYTES # BLD AUTO: 1.06 X10(3) UL (ref 0.1–1)
MONOCYTES NFR BLD AUTO: 9.2 %
NEUTROPHILS # BLD AUTO: 7.62 X10 (3) UL (ref 1.5–7.7)
NEUTROPHILS # BLD AUTO: 7.62 X10(3) UL (ref 1.5–7.7)
NEUTROPHILS NFR BLD AUTO: 65.8 %
PLATELET # BLD AUTO: 353 10(3)UL (ref 150–450)
RBC # BLD AUTO: 4.26 X10(6)UL
WBC # BLD AUTO: 11.6 X10(3) UL (ref 4–11)

## 2024-07-12 PROCEDURE — 82565 ASSAY OF CREATININE: CPT

## 2024-07-12 PROCEDURE — 86140 C-REACTIVE PROTEIN: CPT

## 2024-07-12 PROCEDURE — 84460 ALANINE AMINO (ALT) (SGPT): CPT

## 2024-07-12 PROCEDURE — 36415 COLL VENOUS BLD VENIPUNCTURE: CPT

## 2024-07-12 PROCEDURE — 86480 TB TEST CELL IMMUN MEASURE: CPT | Performed by: INTERNAL MEDICINE

## 2024-07-12 PROCEDURE — 84450 TRANSFERASE (AST) (SGOT): CPT

## 2024-07-12 PROCEDURE — 85652 RBC SED RATE AUTOMATED: CPT

## 2024-07-12 PROCEDURE — 85025 COMPLETE CBC W/AUTO DIFF WBC: CPT

## 2024-07-12 PROCEDURE — 82040 ASSAY OF SERUM ALBUMIN: CPT

## 2024-07-13 LAB
M TB IFN-G CD4+ T-CELLS BLD-ACNC: 0.05 IU/ML
M TB TUBERC IGNF/MITOGEN IGNF CONTROL: 0.11 IU/ML
QFT TB1 AG MINUS NIL: -0.01 IU/ML
QFT TB2 AG MINUS NIL: -0.01 IU/ML

## 2024-07-20 DIAGNOSIS — N52.9 ERECTILE DYSFUNCTION, UNSPECIFIED ERECTILE DYSFUNCTION TYPE: ICD-10-CM

## 2024-07-20 NOTE — TELEPHONE ENCOUNTER
Refill request from [facility] for:      Sildenafil Citrate 100 MG Oral Tab, Take 1 tablet (100 mg total) by mouth daily as needed for Erectile Dysfunction., Disp: 18 tablet, Rfl: 0

## 2024-07-21 NOTE — TELEPHONE ENCOUNTER
Medication pended and routed to Refill team.  Patient advised to schedule appointment to establish care with a new provider.

## 2024-07-24 RX ORDER — SILDENAFIL 100 MG/1
100 TABLET, FILM COATED ORAL DAILY PRN
Qty: 9 TABLET | Refills: 0 | Status: SHIPPED | OUTPATIENT
Start: 2024-07-24

## 2024-07-24 NOTE — TELEPHONE ENCOUNTER
Last ref 3-19-24 by Priscila Lew MD  Routing to POD mate, pls advise, thanks in advance.     Refill Passed Per Protocol    Requested Prescriptions   Pending Prescriptions Disp Refills    Sildenafil Citrate 100 MG Oral Tab 18 tablet 0     Sig: Take 1 tablet (100 mg total) by mouth daily as needed for Erectile Dysfunction.       Genitourinary Medications Passed - 7/21/2024 11:30 AM        Passed - Patient does not have pulmonary hypertension on problem list        Passed - In person appointment or virtual visit in the past 12 mos or appointment in next 3 mos     Recent Outpatient Visits              4 months ago [facility]umatoid [facility] multiple sites without rheumatoid factor [facility]    [facility]    8 months ago Rheumatoid arthritis of multiple sites without rheumatoid factor ([facility])    [facility], Alejandra Paiz MD    Office Visit    11 months ago Wellness examination    [facility] [address], [place] Priscila Lew MD    Office Visit    1 year ago Rheumatoid arthritis of multiple sites without rheumatoid factor ([facility]CC)    [facility]    [facility] Visit    1 year ago Rheumatoid arthritis of multiple sites without rheumatoid factor ([facility]    [facility]          Future Appointments         Provider Department Appt Notes    In 4 weeks [facility]                         Future Appointments         Provider Department Appt Notes    In 4 weeks [facility]          Recent Outpatient Visits              4 months ago Rheumatoid arthritis of multiple sites without rheumatoid factor ([facility])    [facility], Alejandra Paiz MD    Office Visit    8 months ago Rheumatoid arthritis of multiple sites without rheumatoid factor (HCC)    [facility], Alejandra Paiz MD    Office Visit    11 months ago Wellness examination    [facility], [address], [place] Priscila Robins MD    Office Visit    1 year ago Rheumatoid arthritis of multiple sites without rheumatoid factor (HCC)    [facility], Alejandra Paiz MD    Office Visit    1 year ago Rheumatoid arthritis of multiple sites without rheumatoid factor (HCC)    [facility], Alejandra Paiz MD    Office Visit

## 2024-08-21 ENCOUNTER — OFFICE VISIT (OUTPATIENT)
Dept: RHEUMATOLOGY | Facility: CLINIC | Age: 64
End: 2024-08-21
Payer: COMMERCIAL

## 2024-08-21 VITALS
HEIGHT: 70 IN | WEIGHT: 235 LBS | HEART RATE: 60 BPM | DIASTOLIC BLOOD PRESSURE: 65 MMHG | SYSTOLIC BLOOD PRESSURE: 135 MMHG | BODY MASS INDEX: 33.64 KG/M2

## 2024-08-21 DIAGNOSIS — R79.89 ELEVATED LFTS: ICD-10-CM

## 2024-08-21 DIAGNOSIS — Z51.81 MEDICATION MONITORING ENCOUNTER: ICD-10-CM

## 2024-08-21 DIAGNOSIS — M06.09 RHEUMATOID ARTHRITIS OF MULTIPLE SITES WITHOUT RHEUMATOID FACTOR (HCC): Primary | ICD-10-CM

## 2024-08-21 PROCEDURE — 99214 OFFICE O/P EST MOD 30 MIN: CPT | Performed by: INTERNAL MEDICINE

## 2024-08-21 NOTE — PROGRESS NOTES
Cody Savage is a 64 year old male.    HPI:     Chief Complaint   Patient presents with    Rheumatoid Arthritis       I had the pleasure of seeing Cody Savage on 8/21/2024 for follow up Seronegative RA/Spondyloarthritis.     Current medications:  Humira every 2 weeks- started 8/11/2022  Methotrexate 0.4 mL injections weekly- started 8/9/2022  Previous medications:  Prednisone- end July 2022-11/2022  Blood work:  Neg VALENCIA, RF, CCP, HLA-B27, CK  , >130    Interval History:  This is a 61 yo M with hx of HTN, B/L TKR (Dec 2021 and March 2022), L hand Carpal tunnel release (April 2022) presents with polyarthralgias.  About 1 year ago around June 2021 he was doing really well.  Had very minimal joint pain.  In June 2021 they sold their house at that time started having worsening joint pain in his knees.  Mostly his left knee.  X-rays of the knees showed significant medial compartment narrowing in the left knee and moderate medial compartment in the right knee.  He then had his left knee replaced.  Overall did pretty good.  In March he had his right knee replaced and since then has had worsening joint pain overall.  He fell out of bed and dislocated his right shoulder in March, he had a shoulder reduction but was seen by orthopedic and they are recommending surgery.  He has chronic left shoulder pain from wear-and-tear from his job.  He worked as a  for his entire career.  He is also supposed to be having left shoulder surgery.  He then started to have worsening pain in his hands.  He had swelling in his right wrist.  Swelling in his right wrist has been since December 2021.  He also had left elbow pain and swelling.  He received a cortisone shot in his right wrist and left elbow in April 2022.  It did help some of his symptoms.  He had some numbness and tingling in his hands also.  He had an EMG done around April 2022 and per PCPs note he was diagnosed with neuropathy in both upper extremities.  He  then had left carpal tunnel release surgery in April 2022.  The most recent joint pain has been his right wrist and left elbow.  Blood work in April 2022 did show elevated , repeat in May was >130.  Denies any history of psoriasis, temporal pain, headache, jaw pain or blurry vision  Reviewing his right knee synovial biopsy from March 2022, findings are suggestive of rheumatoid arthritis    6/29/20022:  Presents for f/u of Seronegative RA  Recent blood work showed ESR>130 and CRP 15.1 mg/dL  Negative RF, CCP and HLA-B27  X-ray of the right wrist does show marked joint space narrowing and 2 erosions on the lateral cortex of the scaphoid, erosive changes on the ulnar styloid  Has been on prednisone for about 2 weeks now, on prednisone 10 mg daily  Now  Joints feel 40% better. Has some mild pain in the hands and also some numbness in the L hand. R hand is doing better, mild pain in the wrist   Shoulder have chronic pain, r shoulder abduction limited to 30 degrees and left shoulder abduction limited to 90 degrees  Knees are doing better, can walk a mile   Also in PT twice a week for the knees and the shoulders  Had a cortisone shot in R shoulder last week and helped it a lot     8/8/2022:  Presents for f/u of Seronegative RA  Recent blood work showed ESR>130 and CRP 15.1 mg/dL  Negative RF, CCP and HLA-B27  X-ray of the right wrist does show marked joint space narrowing and 2 erosions on the lateral cortex of the scaphoid, erosive changes on the ulnar styloid  Remains on prednisone 10 mg daily, decreased to 5 mg but had more s/e  Having a lot of joint pain and swelling and difficulty ambulating due to his pain  Reports swelling in both hands, fingers, right wrist and right ankle.  Also having pain in both knees  Patient wife states that his symptoms have worsened significantly    9/20/2022:   Presents for follow-up of seronegative RA/spondylarthritis  He was started on Humira and methotrexate back in August.  He  also remains on prednisone 10 mg daily  Joint pain has significantly improved  He is able to do his own ADLs.  Can walk 5000 steps a day.  Hands are better.   Wife states that during his last visit he was not able to do anything for himself, not able to do his own ADLs  States that he can make a fist now.  Right ankle swelling improved  Seen by orthopedic and planning on having his shoulder surgery at the end of November 12/20/2022:   Presents for follow-up of seronegative RA/spondylarthritis  Currently on methotrexate 8 pills weekly and Humira every 2 weeks  Has been off of prednisone now since November  When tapered off prednisone has some worsening joint pain for about 1 week and then joints got better.  Having some pain and swelling in the ankles, R is worse than L  Ankles are more swollen, less pain. When wake up in AM no swelling in ankles but as days goes on swelling worsens.  He was on a diuretic, chlorthalidone but stopped a month ago. prescription ran out and was not renew.   Has been really active, walking 3 times a week. Also has been golfing and bowling.  Has been getting more nauseus and has been having some headaches.     3/15/2023:   Presents for follow-up of seronegative RA/spondylarthritis  Currently on methotrexate 0.8 mL injecitons weekly and Humira every 2 weeks  Was switched from oral to injection methotrexate last visit  Having some clicking and popping in ankles and hips. mostly the left hip going up to the left gluteal region  Also working out at gym 4 days a week, walks for 30 min and some weights. Some soreness in the left elbow  r shoulder is doing better, less pain. Able to raise it now  Hands and wrist doing good, can make a fist    7/19/2023:   Presents for follow-up of seronegative RA/spondylarthritis  Currently on methotrexate 0.8 mL injecitons weekly and Humira every 2 weeks  Has been bike riding, 17 miles and doing well  Minimal joint pain  Doing well overall    11/6/2023:    Presents for follow-up of seronegative RA/spondylarthritis  Currently on methotrexate 0.6 mL injecitons weekly and Humira every 2 weeks  Joints are pretty stable  Continues to stay active, biked 20 miles yesterday.   R shoulder is good some mild pain in the left shoulder    3/18/2024:   Presents for follow-up of seronegative RA/spondylarthritis  Currently on methotrexate 0.4 mL injecitons weekly and Humira every 2 weeks, he will be transitioning to Hyrimoz every 2 weeks  Joints are pretty stable  Continues to stay active, continues to bike  R shoulder is good some mild pain in the left shoulder    8/21/2024:   Presents for follow-up of seronegative RA/spondylarthritis  Is now on Hyrimoz every 2 weeks  Methotrexate was discontinued due to elevated liver enzymes, this was about 1 month ago  Recent blood work in July showing slightly elevated LFTs  Minimal joint pain  Staying active, fishing, bowling, biking            HISTORY:  Past Medical History:    High cholesterol    Osteoarthritis      Social Hx Reviewed   Family Hx Reviewed     Medications (Active prior to today's visit):  Current Outpatient Medications   Medication Sig Dispense Refill    Sildenafil Citrate 100 MG Oral Tab Take 1 tablet (100 mg total) by mouth daily as needed for Erectile Dysfunction. 9 tablet 0    chlorthalidone 25 MG Oral Tab Take 1 tablet (25 mg total) by mouth daily. 90 tablet 1    rosuvastatin 5 MG Oral Tab Take 1 tablet (5 mg total) by mouth nightly. 90 tablet 1    Adalimumab-adaz (HYRIMOZ) 40 MG/0.4ML Subcutaneous Solution Auto-injector Inject 40 mg into the skin every 14 (fourteen) days. 1 mL 5    losartan 25 MG Oral Tab Take 0.5 tablets (12.5 mg total) by mouth daily. 45 tablet 3    folic acid 1 MG Oral Tab Take 1 tablet (1 mg total) by mouth daily. (Patient not taking: Reported on 8/21/2024) 90 tablet 3    Methotrexate Sodium 50 MG/2ML Injection Solution Inject 0.8 mL into the skin every 7 days. (Patient taking differently: Inject 0.4  mL into the skin every 7 days.) 9.6 mL 1    sodium chloride 0.65 % Nasal Solution 1 spray by Nasal route as needed for congestion. (Patient not taking: Reported on 8/21/2024) 30 mL 0    Insulin Syringe 30G X 5/16\" 1 ML Does not apply Misc Patient will inject methotrexate subcutaneously 0.8 mL every 7 days (Patient not taking: Reported on 8/21/2024) 4 each 2     .cmed  Allergies:  No Known Allergies      ROS:   All other ROS are negative.     PHYSICAL EXAM:   GEN: AAOx3, NAD  HEENT: EOMI, PERRLA, no injection or icterus, oral mucosa moist, no oral lesions. No lymphadenopathy. No facial rash  CVS: RRR, no murmurs rubs or gallops. Equal 2+ distal pulses.   LUNGS: CTAB, no increased work of breathing  ABDOMEN:  soft NT/ND, +BS, no HSM  SKIN: No rashes or skin lesions. No nail findings  MSK:  Cervical spine: FROM  Hands: Swelling improved, has happened Nicky's nodes.  Able to make a full fist  Wrist: no swelling now  Elbow: FROM, no pain or swelling or warmth on palpation  Shoulders: Bilateral shoulder abduction now limited to 100 degrees  Hip: normal log roll, no lateral hip pain, JOSE ALBERTO test negative b/l  Knees: FROM, no warmth or effusion present. No pain with ROM.   Ankles: R ankle no swelling  Feet: no pain with MTP squeeze, no toe swelling or pain or warmth on palpation with FROM  Spine: no lumbar or sacral pain on palpation.  EXT: 1+edema in LE   NEURO: Cranial nerves II-XII intact grossly. 5/5 strength throughout in both upper and lower extremities, sensation intact.  PSYCH: normal mood       LABS:     Component      Latest Ref Rng & Units 6/13/2022   QUANTIFERON(R)-TB GOLD PLUS, 1 TUBE      NEGATIVE NEGATIVE   NIL      IU/mL 0.02   MITOGEN-NIL      IU/mL >10.00   TB1-NIL      IU/mL 0.01   TB2-NIL      IU/mL 0.01   HCV AB      NON-REACTIVE NON-REACTIVE   SIGNAL TO CUT-OFF      <1.00 0.21   C-REACTIVE PROTEIN      <8.0 mg/L 151.8 (H)   CYCLIC CITRULLINATED$PEPTIDE (CCP) AB (IGG)      UNITS <16   SED RATE BY  MODIFIED$WESTERGREN      < OR = 20 mm/h >130 (H)   HLA-B27 ANTIGEN      NEGATIVE NEGATIVE   HBSAg Screen      NON-REACTIVE NON-REACTIVE   HEPATITIS B SURFACE$ANTIBODY QL      NON-REACTIVE REACTIVE (A)   HEPATITIS B CORE AB TOTAL      NON-REACTIVE NON-REACTIVE     Imaging:     XR R wrist:  BONES: There is mild narrowing of the radial scaphoid joint space.  There is marked narrowing of the joint space between the lunate and capitate with associated subchondral sclerosis.  Two erosions project along the lateral cortex of the scaphoid   measuring 3 mm.  There is a 4 mm erosion along the medial triquetrum.  There is erosive change along the tip of the ulnar styloid.   SOFT TISSUES: Negative. No visible soft tissue swelling.   EFFUSION: None visible.   OTHER: Negative.     ASSESSMENT/PLAN:     Seronegative RA/spondylarthritis- improved  - X-ray of the right wrist showed evidence of marked narrowing with erosions. Also right knee synovial biopsy in March 2022 was suggestive of rheumatoid arthritis. Elevated ESR and CRP  - He was switched to Hyrimoz every 2 weeks and doing well  - Also off methotrexate 7/2024 due to high LFTs  - Joint pain is improved significantly on this regimen  - Blood work every next month and every 3 mos     Bilateral knee replacement secondary to OA  - Again right knee synovial biopsy did show evidence of inflammation, possible suggestive of RA     Left carpal tunnel release- stable  - Symptoms are stable.  Continues to have some numbness and tingling  - will use wrist splint  Right shoulder dislocation- improved, stable   - He dislocated his right shoulder back in March 2022, does not want surgery now, doing well   - ROM has improved, less painful now   Chronic left shoulder pain. Improved   - Following with orthopedic, has been ongoing for the past 10 years.    Pt will f/u in 4-5 mos     There is a longitudinal care relationship with me, the care plan reflects the ongoing nature of the continuous  relationship of care, and the medical record indicates that there is ongoing treatment of a serious/complex medical condition which I am currently managing.  is Applicable.     Alejandra Hayes MD  8/21/2024  10:00 AM

## 2024-08-21 NOTE — PATIENT INSTRUCTIONS
You were seen today for rheumatoid arthritis  Joints are doing really well  You are off methotrexate and folic acid now which is the high liver enzymes  Stay on Hyrimoz every 2 weeks  Repeat your blood work in September and then also January  See me in January

## 2024-09-03 RX ORDER — ADALIMUMAB-ADAZ 40 MG/.4ML
1 INJECTION, SOLUTION SUBCUTANEOUS
Qty: 2 EACH | Refills: 5 | Status: SHIPPED | OUTPATIENT
Start: 2024-09-03

## 2024-09-03 NOTE — TELEPHONE ENCOUNTER
Requested Prescriptions     Pending Prescriptions Disp Refills    HYRIMOZ 40 MG/0.4ML Subcutaneous Solution Auto-injector [Pharmacy Med Name: HYRIMOZ PEN 40MG/0.4ML]  5     Sig: INJECT 1 PEN UNDER THE SKIN EVERY 14 DAYS     Future Appointments   Date Time Provider Department Center   9/19/2024  9:20 AM Vijay Wren MD [facility]   1/22/2025  9:40 AM Alejandra Hayes MD [facility]     LOV: 8/21/24   Last Refilled:2/20/24 #1ml 5RF   Labs:  Component      Latest Ref Rng 7/12/2024   WBC      4.0 - 11.0 x10(3) uL 11.6 (H)    RBC      3.80 - 5.80 x10(6)uL 4.26    Hemoglobin      13.0 - 17.5 g/dL 14.6    Hematocrit      39.0 - 53.0 % 41.3    MCV      80.0 - 100.0 fL 96.9    MCH      26.0 - 34.0 pg 34.3 (H)    MCHC      31.0 - 37.0 g/dL 35.4    RDW-SD      35.1 - 46.3 fL 50.7 (H)    RDW      11.0 - 15.0 % 14.6    Platelet Count      150.0 - 450.0 10(3)uL 353.0    Prelim Neutrophil Abs      1.50 - 7.70 x10 (3) uL 7.62    Neutrophils Absolute      1.50 - 7.70 x10(3) uL 7.62    Lymphocytes Absolute      1.00 - 4.00 x10(3) uL 2.60    Monocytes Absolute      0.10 - 1.00 x10(3) uL 1.06 (H)    Eosinophils Absolute      0.00 - 0.70 x10(3) uL 0.17    Basophils Absolute      0.00 - 0.20 x10(3) uL 0.08    Immature Granulocyte Absolute      0.00 - 1.00 x10(3) uL 0.03    Neutrophils %      % 65.8    Lymphocytes %      % 22.5    Monocytes %      % 9.2    Eosinophils %      % 1.5    Basophils %      % 0.7    Immature Granulocyte %      % 0.3    CREATININE      0.70 - 1.30 mg/dL 1.41 (H)    EGFR      >=60 mL/min/1.73m2 55 (L)    SED RATE      0 - 20 mm/Hr 22 (H)    C-REACTIVE PROTEIN      <1.00 mg/dL 1.10 (H)    AST (SGOT)      <=34 U/L 40 (H)    ALT (SGPT)      10 - 49 U/L 61 (H)    Albumin      3.2 - 4.8 g/dL 4.6       Legend:  (H) High  (L) Low    ASSESSMENT/PLAN:      Seronegative RA/spondylarthritis- improved  - X-ray of the right wrist showed evidence of marked narrowing with erosions. Also right knee synovial biopsy in  March 2022 was suggestive of rheumatoid arthritis. Elevated ESR and CRP  - He was switched to Hyrimoz every 2 weeks and doing well  - Also off methotrexate 7/2024 due to high LFTs  - Joint pain is improved significantly on this regimen  - Blood work every next month and every 3 mos      Bilateral knee replacement secondary to OA  - Again right knee synovial biopsy did show evidence of inflammation, possible suggestive of RA     Left carpal tunnel release- stable  - Symptoms are stable.  Continues to have some numbness and tingling  - will use wrist splint  Right shoulder dislocation- improved, stable   - He dislocated his right shoulder back in March 2022, does not want surgery now, doing well   - ROM has improved, less painful now   Chronic left shoulder pain. Improved   - Following with orthopedic, has been ongoing for the past 10 years.     Pt will f/u in 4-5 mos      There is a longitudinal care relationship with me, the care plan reflects the ongoing nature of the continuous relationship of care, and the medical record indicates that there is ongoing treatment of a serious/complex medical condition which I am currently managing.  is Applicable.      Alejandra Hayes MD  8/21/2024  10:00 AM

## 2024-09-19 ENCOUNTER — LAB ENCOUNTER (OUTPATIENT)
Dept: LAB | Age: 64
End: 2024-09-19
Attending: STUDENT IN AN ORGANIZED HEALTH CARE EDUCATION/TRAINING PROGRAM
Payer: COMMERCIAL

## 2024-09-19 ENCOUNTER — OFFICE VISIT (OUTPATIENT)
Dept: INTERNAL MEDICINE CLINIC | Facility: CLINIC | Age: 64
End: 2024-09-19
Payer: COMMERCIAL

## 2024-09-19 VITALS
HEIGHT: 70 IN | DIASTOLIC BLOOD PRESSURE: 69 MMHG | SYSTOLIC BLOOD PRESSURE: 113 MMHG | BODY MASS INDEX: 33.79 KG/M2 | HEART RATE: 59 BPM | WEIGHT: 236 LBS

## 2024-09-19 DIAGNOSIS — N52.9 ERECTILE DYSFUNCTION, UNSPECIFIED ERECTILE DYSFUNCTION TYPE: ICD-10-CM

## 2024-09-19 DIAGNOSIS — F17.200 TOBACCO USE DISORDER: Chronic | ICD-10-CM

## 2024-09-19 DIAGNOSIS — Z12.5 SCREENING FOR PROSTATE CANCER: ICD-10-CM

## 2024-09-19 DIAGNOSIS — J43.2 CENTRILOBULAR EMPHYSEMA (HCC): Chronic | ICD-10-CM

## 2024-09-19 DIAGNOSIS — M05.79 RHEUMATOID ARTHRITIS INVOLVING MULTIPLE SITES WITH POSITIVE RHEUMATOID FACTOR (HCC): Chronic | ICD-10-CM

## 2024-09-19 DIAGNOSIS — Z51.81 MEDICATION MONITORING ENCOUNTER: ICD-10-CM

## 2024-09-19 DIAGNOSIS — M06.09 RHEUMATOID ARTHRITIS OF MULTIPLE SITES WITHOUT RHEUMATOID FACTOR (HCC): ICD-10-CM

## 2024-09-19 DIAGNOSIS — I25.10 ATHEROSCLEROSIS OF NATIVE CORONARY ARTERY OF NATIVE HEART WITHOUT ANGINA PECTORIS: Chronic | ICD-10-CM

## 2024-09-19 DIAGNOSIS — Z00.00 ANNUAL PHYSICAL EXAM: ICD-10-CM

## 2024-09-19 DIAGNOSIS — R73.03 PRE-DIABETES: ICD-10-CM

## 2024-09-19 DIAGNOSIS — E55.9 VITAMIN D DEFICIENCY: ICD-10-CM

## 2024-09-19 DIAGNOSIS — R39.12 WEAK URINARY STREAM: ICD-10-CM

## 2024-09-19 DIAGNOSIS — R39.12 WEAK URINARY STREAM: Chronic | ICD-10-CM

## 2024-09-19 DIAGNOSIS — Z00.00 ANNUAL PHYSICAL EXAM: Primary | ICD-10-CM

## 2024-09-19 DIAGNOSIS — I10 ESSENTIAL HYPERTENSION: ICD-10-CM

## 2024-09-19 PROBLEM — M17.11 ARTHRITIS OF RIGHT KNEE: Status: RESOLVED | Noted: 2022-03-16 | Resolved: 2024-09-19

## 2024-09-19 PROBLEM — N62 GYNECOMASTIA, MALE: Status: RESOLVED | Noted: 2022-06-06 | Resolved: 2024-09-19

## 2024-09-19 PROBLEM — I25.83 CORONARY ARTERY DISEASE DUE TO LIPID RICH PLAQUE: Status: RESOLVED | Noted: 2024-09-19 | Resolved: 2024-09-19

## 2024-09-19 PROBLEM — S42.91XG: Status: RESOLVED | Noted: 2022-03-16 | Resolved: 2024-09-19

## 2024-09-19 PROBLEM — M17.12 ARTHRITIS OF LEFT KNEE: Status: RESOLVED | Noted: 2021-12-01 | Resolved: 2024-09-19

## 2024-09-19 PROBLEM — I25.83 CORONARY ARTERY DISEASE DUE TO LIPID RICH PLAQUE: Status: ACTIVE | Noted: 2024-09-19

## 2024-09-19 LAB
ALBUMIN SERPL-MCNC: 4.5 G/DL (ref 3.2–4.8)
ALBUMIN/GLOB SERPL: 1.5 {RATIO} (ref 1–2)
ALP LIVER SERPL-CCNC: 65 U/L
ALT SERPL-CCNC: 50 U/L
ANION GAP SERPL CALC-SCNC: 6 MMOL/L (ref 0–18)
AST SERPL-CCNC: 33 U/L (ref ?–34)
BASOPHILS # BLD AUTO: 0.07 X10(3) UL (ref 0–0.2)
BASOPHILS NFR BLD AUTO: 0.8 %
BILIRUB SERPL-MCNC: 1.1 MG/DL (ref 0.2–1.1)
BUN BLD-MCNC: 26 MG/DL (ref 9–23)
BUN/CREAT SERPL: 21.7 (ref 10–20)
CALCIUM BLD-MCNC: 9.3 MG/DL (ref 8.7–10.4)
CHLORIDE SERPL-SCNC: 101 MMOL/L (ref 98–112)
CHOLEST SERPL-MCNC: 119 MG/DL (ref ?–200)
CO2 SERPL-SCNC: 30 MMOL/L (ref 21–32)
COMPLEXED PSA SERPL-MCNC: 0.38 NG/ML (ref ?–4)
CREAT BLD-MCNC: 1.2 MG/DL
CRP SERPL-MCNC: 0.9 MG/DL (ref ?–1)
DEPRECATED RDW RBC AUTO: 47.8 FL (ref 35.1–46.3)
EGFRCR SERPLBLD CKD-EPI 2021: 67 ML/MIN/1.73M2 (ref 60–?)
EOSINOPHIL # BLD AUTO: 0.24 X10(3) UL (ref 0–0.7)
EOSINOPHIL NFR BLD AUTO: 2.6 %
ERYTHROCYTE [DISTWIDTH] IN BLOOD BY AUTOMATED COUNT: 13.3 % (ref 11–15)
ERYTHROCYTE [SEDIMENTATION RATE] IN BLOOD: 28 MM/HR
EST. AVERAGE GLUCOSE BLD GHB EST-MCNC: 131 MG/DL (ref 68–126)
FASTING PATIENT LIPID ANSWER: YES
FASTING STATUS PATIENT QL REPORTED: YES
GLOBULIN PLAS-MCNC: 3 G/DL (ref 2–3.5)
GLUCOSE BLD-MCNC: 113 MG/DL (ref 70–99)
HBA1C MFR BLD: 6.2 % (ref ?–5.7)
HCT VFR BLD AUTO: 44.4 %
HDLC SERPL-MCNC: 48 MG/DL (ref 40–59)
HGB BLD-MCNC: 15.3 G/DL
IMM GRANULOCYTES # BLD AUTO: 0.04 X10(3) UL (ref 0–1)
IMM GRANULOCYTES NFR BLD: 0.4 %
LDLC SERPL CALC-MCNC: 58 MG/DL (ref ?–100)
LYMPHOCYTES # BLD AUTO: 2.33 X10(3) UL (ref 1–4)
LYMPHOCYTES NFR BLD AUTO: 25.5 %
MCH RBC QN AUTO: 33.3 PG (ref 26–34)
MCHC RBC AUTO-ENTMCNC: 34.5 G/DL (ref 31–37)
MCV RBC AUTO: 96.5 FL
MONOCYTES # BLD AUTO: 1.12 X10(3) UL (ref 0.1–1)
MONOCYTES NFR BLD AUTO: 12.2 %
NEUTROPHILS # BLD AUTO: 5.35 X10 (3) UL (ref 1.5–7.7)
NEUTROPHILS # BLD AUTO: 5.35 X10(3) UL (ref 1.5–7.7)
NEUTROPHILS NFR BLD AUTO: 58.5 %
NONHDLC SERPL-MCNC: 71 MG/DL (ref ?–130)
OSMOLALITY SERPL CALC.SUM OF ELEC: 290 MOSM/KG (ref 275–295)
PLATELET # BLD AUTO: 353 10(3)UL (ref 150–450)
POTASSIUM SERPL-SCNC: 3.6 MMOL/L (ref 3.5–5.1)
PROT SERPL-MCNC: 7.5 G/DL (ref 5.7–8.2)
RBC # BLD AUTO: 4.6 X10(6)UL
SODIUM SERPL-SCNC: 137 MMOL/L (ref 136–145)
TRIGL SERPL-MCNC: 56 MG/DL (ref 30–149)
TSI SER-ACNC: 0.72 MIU/ML (ref 0.55–4.78)
VIT D+METAB SERPL-MCNC: 62.9 NG/ML (ref 30–100)
VLDLC SERPL CALC-MCNC: 8 MG/DL (ref 0–30)
WBC # BLD AUTO: 9.2 X10(3) UL (ref 4–11)

## 2024-09-19 PROCEDURE — 80053 COMPREHEN METABOLIC PANEL: CPT

## 2024-09-19 PROCEDURE — 36415 COLL VENOUS BLD VENIPUNCTURE: CPT

## 2024-09-19 PROCEDURE — 85652 RBC SED RATE AUTOMATED: CPT

## 2024-09-19 PROCEDURE — 83036 HEMOGLOBIN GLYCOSYLATED A1C: CPT

## 2024-09-19 PROCEDURE — 80061 LIPID PANEL: CPT

## 2024-09-19 PROCEDURE — 86140 C-REACTIVE PROTEIN: CPT

## 2024-09-19 PROCEDURE — 85025 COMPLETE CBC W/AUTO DIFF WBC: CPT

## 2024-09-19 PROCEDURE — 99203 OFFICE O/P NEW LOW 30 MIN: CPT | Performed by: STUDENT IN AN ORGANIZED HEALTH CARE EDUCATION/TRAINING PROGRAM

## 2024-09-19 PROCEDURE — 99387 INIT PM E/M NEW PAT 65+ YRS: CPT | Performed by: STUDENT IN AN ORGANIZED HEALTH CARE EDUCATION/TRAINING PROGRAM

## 2024-09-19 PROCEDURE — 82306 VITAMIN D 25 HYDROXY: CPT

## 2024-09-19 PROCEDURE — 84443 ASSAY THYROID STIM HORMONE: CPT

## 2024-09-19 RX ORDER — ROSUVASTATIN CALCIUM 10 MG/1
10 TABLET, COATED ORAL NIGHTLY
Qty: 90 TABLET | Refills: 1 | Status: SHIPPED | OUTPATIENT
Start: 2024-09-19 | End: 2025-03-18

## 2024-09-19 RX ORDER — TAMSULOSIN HYDROCHLORIDE 0.4 MG/1
0.4 CAPSULE ORAL DAILY
Qty: 90 CAPSULE | Refills: 1 | Status: SHIPPED | OUTPATIENT
Start: 2024-09-19 | End: 2025-03-18

## 2024-09-19 RX ORDER — LOSARTAN POTASSIUM 25 MG/1
25 TABLET ORAL NIGHTLY
Qty: 90 TABLET | Refills: 1 | Status: SHIPPED | OUTPATIENT
Start: 2024-09-19 | End: 2025-03-18

## 2024-09-19 RX ORDER — SILDENAFIL 100 MG/1
100 TABLET, FILM COATED ORAL DAILY PRN
Qty: 30 TABLET | Refills: 11 | Status: SHIPPED | OUTPATIENT
Start: 2024-09-19

## 2024-09-19 NOTE — PATIENT INSTRUCTIONS
Controlling High Blood Pressure   High blood pressure (hypertension) is often called the silent killer. This is because many people who have it, don’t know it. It can be very dangerous. High blood pressure can raise your risk of heart attack, stroke, heart disease, and heart failure. Controlling your blood pressure can lower your risk of these problems. It's important to check yourblood pressure regularly. It can save your life.   Blood pressure measurements are given as 2 numbers. Systolic blood pressure is the upper number. This is the pressure when the heart contracts. Diastolic blood pressure is the lower number. This is the pressure when the heartrelaxes between beats.   Blood pressure is grouped like this:   Normal blood pressure. This is systolic of less than 120 and diastolic of less than 80 (120/80).  Elevated blood pressure.  This is systolic of 120 to 129 and diastolic less than 80.  Stage 1 high blood pressure.  This is systolic of 130 to 139 or diastolic between 80 to 89.  Stage 2 high blood pressure.  This is systolic of 140 or higher or diastolic of 90 or higher.  A heart-healthy lifestyle can help you control your blood pressure withoutmedicines. Below are some things you can do to have a heart-healthy lifestyle.     Eat heart-healthy foods   Choose low-salt, low-fat foods. Limit your sodium to 2,300 mg per day or the amount advised by your healthcare provider.  Limit canned, dried, cured, packaged, and fast foods. These can contain a lot of salt.  Eat 8 to 10 servings of fruits and vegetables every day.  Choose lean meats, fish, or chicken.  Eat whole-grain pasta, brown rice, and beans.  Eat 2 to 3 servings of low-fat or fat-free dairy products.  Ask your doctor about the DASH eating plan. This plan helps reduce blood pressure.  When you go to a restaurant, ask that your meal be made with no added salt.    Stay at a healthy weight   Ask your healthcare provider how many calories to eat a day. Then  stick to that number.  Ask your provider what weight range is healthiest for you. If you are overweight, a weight loss of only 3% to 5% of your body weight can help lower blood pressure. A good weight loss goal is to lose 10% of your body weight in a year.  Limit snacks and sweets.  Get regular exercise.    Get more active   Find activities you enjoy. They can be done alone or with friends or family. Try bicycling, dancing, walking, or jogging.  Park farther away from building entrances to walk more.  Use stairs instead of the elevator.  When you can, walk or bike instead of driving.  [place] leaves, garden, or do household repairs.  Be active at a moderate to vigorous level of physical activity for at least 30 minutes a day for at least 5 days a week.     Manage stress   Make time to relax and enjoy life. Find time to laugh.  Talk about your concerns with your loved ones and your healthcare provider.  Visit with family and friends, and keep up with hobbies.    Limit alcohol and quit smoking   Men should have no more than 2 drinks per day.  Women should have no more than 1 drink per day.  If you smoke, make a plan to stop. Talk with your healthcare provider for help. Smoking greatly raises your risk for heart disease and stroke. Ask your provider about stop-smoking programs and other support.    Blood pressure medicines  If your lifestyle changes aren’t enough, your healthcare provider may prescribe high blood pressure medicine. Take all medicines as prescribed. If you have any questions about yourmedicines, ask your provider before stopping or changing them.   [employer] last reviewed this educational content on12/1/2021 © 2000-2022 [employer]. All rights reserved. This information is not intended as a substitute for professional medical care. Always follow [facility] Hospitalcare professional's instruction    [employer]™  What is High Blood Pressure?  Understand what blood pressure is, the health risks  of having high blood pressure, the factors that put you at risk for having high blood pressure, and the importance of working with your healthcare provider to control it.  To watch the video:  Scan the QR code  Using your mobile device, scan the following code:  OR  Go to the website:  [employer]  Enter the prescription code:  3414E    © 2000-2022 [employer]. All rights reserved. This information is not intended as a substitute for professional medical care. Always follow your healthcare professional's instructions.    [employer]  Eating Well with High Blood Pressure  Certain foods can make your blood pressure go too high. Watch and learn how easy it is to have delicious meals without harming your health.     To watch the video:  Scan the QR code  Using your mobile device, scan the following code:  OR  Go to the website:  [email or web address].[email or web address]  Enter the prescription code:   QIX       © 2000-2022 [employer]. All rights reserved. This information is not intended as a substitute for professional medical care. Always follow your healthcare professional's instructions.    Taking Your Blood Pressure  Blood pressure is the force of blood against the artery wall as it moves from the heart through the blood vessels. You can take your own blood pressure reading using a digital monitor. Take your readings the same each time, usingthe same arm. Take readings as often as your healthcare provider advises.   About blood pressure monitors  Blood pressure monitors are designed for certain ages and cases. You can find monitors for older adults, for pregnant women, and for children. Make sure theone you choose is the right one for your age and situation.   Experts advise an automatic cuff monitor that fits on your upper arm (bicep). The cuff should fit your arm size. A cuff that’s too large or too small won't give an accurate reading. Measure around yourupper arm to find your  size.   Monitors that attach to your finger or wrist are not as accurate as monitorsfor your upper arm.   Ask your healthcare provider for help in choosing a monitor. Bring your monitorto your next provider visit if you need help in using it the correct way.   The steps below are general instructions for using an automatic digitalmonitor.   Step 1. Relax    Take your blood pressure at the same time every day, such as in the morning or evening. Or take it at the time your healthcare provider advises.  Wait at least 30 minutes after smoking, eating, or exercising. Don't drink coffee, tea, soda, or other caffeinated drinks before checking your blood pressure. Use the restroom beforehand.  Sit comfortably at a table with both feet on the floor. Don't cross your legs or feet. Place the monitor near you.  Rest for at least 5 minutes before you begin. Make sure there are no distractions. This includes TV, cell phones, and other electronics. Wait to have conversations with others until after you measure you blood pressure.  Step 2. Wrap the cuff    Place your arm on the table, palm up. Your arm should be at the level of your heart. Wrap the cuff around your upper arm, just above your elbow. It’s best done on bare skin, not over clothing. Most cuffs will show you where the blood vessel in the middle of the arm at the inner side of the elbow (the brachial artery) should line up with the cuff. Look in your monitor's instruction booklet for an illustration. You can also bring your cuff to your healthcare provider and have them show you how to correctly place the cuff.  Step 3. Inflate the cuff    Push the button that starts the pump.  The cuff will tighten, then loosen.  The numbers will change. When they stop changing, your blood pressure reading will appear.  Take 2 or 3 readings 1 minute apart, or as advised by your provider.  Step 4. Write down the results of each reading    Write down your blood pressure numbers for each  reading. Note the date and time. Keep your results in 1 place, such as a notebook. Even if your monitor has a built-in memory, keep a hard copy of the readings.  Remove the cuff from your arm. Turn off the machine.  Bring your blood pressure records with you to each provider visit.  If you start a new blood pressure medicine, note the day you started the new medicine. Also note the day if you change the dose of your medicine. Measure your blood pressure before your take your medicine. This information goes on your blood pressure recording sheet. This will help your provider check how well the medicine changes are working.  Ask your provider what numbers mean that you should call them. Also ask what numbers mean that you should get help right away.  [employer] last reviewed this educational content on12/1/2021 © 2000-2022 [employer]. All rights reserved. This information is not intended as a substitute for professional medical care. Always follow yourhealthcare professional's instructions.

## 2024-09-19 NOTE — PROGRESS NOTES
History of Present Illness   Patient ID: Cody Savage is a 64 year old male.  Chief Complaint: Physical      Cody Savage is a pleasant 64 year old male with PMHx of HTN, b/l TKR (dec 2021 and march 2022), L hand carpal tunnel release (April 2022) and Rheumatoid Arthritis (On Hyrimoz followed by Dr. Hayes/Dr. [facility]) who presents for annual physical exam. Cody Savage is doing well today (states RA medication is working) although does have weak urine stream and does have erectile dysfunction.       [facility]iology: Dr. Luis Alfredo Hayes: Rheum and RA: Dr. Butler ([facility]).     Ortho: [facility] [facility] Dr. Frye forr knees (has periop HTN). 2022 (march )      Saw Goetz in 5/2022 for consult? Seen ID       8/2022: EGD/Colon done by Dr. Kaden Briceño 7 year recall (next due 8/25/2029)    calcium score which was elevated at more than 900 years ago)    Last A1c value was 6.3% done 3/16/2022.     Health Maintenance  - All care gaps addressed with patient.   Health Maintenance Due   Topic Date Due    Lung Cancer Screening  06/05/2023    PSA  04/13/2024    Annual Physical  08/08/2024    COVID-19 Vaccine (4 - 2023-24 season) 09/01/2024       Colonoscopy (45+ or Screening of first-degree relatives of CRC patients is recommended to begin at age 40 or 10 years before the age at diagnosis of the youngest relative diagnosed with CRC): 8/2022: EGD/Colon done by Dr. Kaden Briceño 7 year recall (next due 8/25/2029)  Low Dose Lung CT (50-81y/o, with total 20 pack year history, current or quit within 15y):   Heart Scan/Calcium CT: over 900 (now seen by Cards Dr. Lobato)    Review of Systems  Review of Systems   Constitutional: Negative.    Respiratory: Negative.     Cardiovascular: Negative.    Gastrointestinal: Negative.    Genitourinary:  Positive for difficulty urinating.   Skin: Negative.    Neurological: Negative.        Physical Exam  Vitals:    09/19/24 0920   BP: 113/69   Pulse: 59   Weight: 236 lb  (107 kg)   Height: 5' 10\" (1.778 m)     Body mass index is 33.86 kg/m².  BP Readings from Last 3 Encounters:   09/19/24 113/69   08/21/24 135/65   03/18/24 138/71     Physical Exam      Labs & Imaging  Pertinent labs and imaging reviewed.   Lab Results   Component Value Date     (H) 08/17/2023    BUN 31 (H) 08/17/2023    BUNCREA 27 (H) 08/17/2023    CREATSERUM 1.41 (H) 07/12/2024    ANIONGAP 8 03/28/2022    GFRNAA 91 07/05/2022    GFRAA 106 07/05/2022    CA 9.7 08/17/2023    OSMOCALC 277 03/28/2022    ALKPHO 64 04/24/2024    AST 40 (H) 07/12/2024    ALT 61 (H) 07/12/2024    BILT 1.0 04/24/2024    TP 7.4 04/24/2024    ALB 4.6 07/12/2024    GLOBULIN 2.7 08/17/2023    AGRATIO 1.6 08/17/2023     08/17/2023    K 4.5 08/17/2023     08/17/2023    CO2 27 08/17/2023     Lab Results   Component Value Date     (H) 03/16/2022    A1C 6.3 (H) 03/16/2022     Lab Results   Component Value Date    WBC 11.6 (H) 07/12/2024    RBC 4.26 07/12/2024    HGB 14.6 07/12/2024    HCT 41.3 07/12/2024    MCV 96.9 07/12/2024    MCH 34.3 (H) 07/12/2024    MCHC 35.4 07/12/2024    RDW 14.6 07/12/2024    .0 07/12/2024     Lab Results   Component Value Date    CHOLEST 139 08/17/2023    TRIG 55 08/17/2023    HDL 49 08/17/2023    LDL 76 08/17/2023    TCHDLRATIO 2.8 08/17/2023    NONHDLC 90 08/17/2023     The 10-year ASCVD risk score (Ken CONTEH, et al., 2019) is: 10.3%    Values used to calculate the score:      Age: 64 years      Sex: Male      Is Non- : No      Diabetic: No      Tobacco smoker: No      Systolic Blood Pressure: 113 mmHg      Is BP treated: Yes      HDL Cholesterol: 49 mg/dL      Total Cholesterol: 139 mg/dL    Medical History    Reviewed allergies:  Allergies   Allergen Reactions    Lisinopril Coughing        Reviewed:  Patient Active Problem List    Diagnosis    Rheumatoid arthritis involving multiple sites with positive rheumatoid factor (HCC)    Atherosclerosis of native  coronary artery of native heart without angina pectoris    Centrilobular emphysema (HCC)    S/P TKR (total knee replacement), right    S/P total knee replacement, left      Reviewed:  Past Medical History:    High cholesterol    Osteoarthritis      Reviewed:  Family History   Problem Relation Age of Onset    Hypertension Father     Hypertension Mother        Reviewed:  Past Surgical History:   Procedure Laterality Date    Colonoscopy N/A 2022    Procedure: COLONOSCOPY/ ESOPHAGOGASTRODUODENOSCOPY (EGD);  Surgeon: Kaden Briceño MD;  Location: [facility] ENDO    Replacement total knee Left 2021    Dr. Behery      Reviewed:  Social History     Socioeconomic History    Marital status:    Tobacco Use    Smoking status: Former     Current packs/day: 0.00     Average packs/day: 1 pack/day for 40.0 years (40.0 ttl pk-yrs)     Types: Cigarettes     Start date:      Quit date:      Years since quittin.7     Passive exposure: Past    Smokeless tobacco: Never   Vaping Use    Vaping status: Never Used   Substance and Sexual Activity    Alcohol use: Not Currently     Comment: occ    Drug use: Yes     Types: Cannabis     Comment: gummies      Reviewed:  Current Outpatient Medications   Medication Sig Dispense Refill    losartan 25 MG Oral Tab Take 1 tablet (25 mg total) by mouth at bedtime. 90 tablet 1    rosuvastatin 10 MG Oral Tab Take 1 tablet (10 mg total) by mouth nightly. 90 tablet 1    Sildenafil Citrate 100 MG Oral Tab Take 1 tablet (100 mg total) by mouth daily as needed for Erectile Dysfunction. 30 tablet 11    tamsulosin 0.4 MG Oral Cap Take 1 capsule (0.4 mg total) by mouth daily. 90 capsule 1    Adalimumab-adaz (HYRIMOZ) 40 MG/0.4ML Subcutaneous Solution Auto-injector INJECT 1 PEN UNDER THE SKIN EVERY 14 DAYS 2 each 5    sodium chloride 0.65 % Nasal Solution 1 spray by Nasal route as needed for congestion. (Patient not taking: Reported on 2024) 30 mL 0          Assessment & Plan    1.  Annual physical exam  - CT LUNG LD SCREENING(CPT=71271); Future  - PSA Total, Screen; Future  - Lipid Panel; Future  - TSH W Reflex To Free T4; Future  - Hemoglobin A1C; Future  - Comp Metabolic Panel (14); Future  - CBC With Differential With Platelet; Future  - Vitamin D; Future  - Prevnar 20 (PCV20) [64831]  Patient here for physical exam and due for following.  Plan:  -order the following Labs: CBC, CMP, Lipid Panel, A1C, TSH, Vitamin D,  PSA in males.   Low dose CT screen and given history of smoking and immuncompromized status recommended Prevnar, Covid and influenza  Pt agreed to prevnar once in stock (nurse visit) and declined covid/flu       2. Centrilobular emphysema (HCC)  - Prevnar 20 (PCV20) [46362]  Low dose CT screen and given history of smoking and immuncompromized status recommended Prevnar, Covid and influenza  Pt agreed to prevnar once in stock (nurse visit) and declined covid/flu    3. Tobacco use disorder  - CT LUNG LD SCREENING(CPT=71271); Future  Pt former smoker, due for Low dose CT screen    4. Weak urinary stream  - PSA Total, Screen; Future  - Urology Referral - In Network  Pt with likey urethral stricture vs BPH  Plan  -start flowmax 0.4mg and if no improvement follow up with urology    5. Screening for prostate cancer  - PSA Total, Screen; Future  - Urology Referral - In Network  Ordered screening PSA if elevated repeat and send to urology    6. Erectile dysfunction, unspecified erectile dysfunction type  - PSA Total, Screen; Future  - Sildenafil Citrate 100 MG Oral Tab; Take 1 tablet (100 mg total) by mouth daily as needed for Erectile Dysfunction.  Dispense: 30 tablet; Refill: 11  - Urology Referral - In Network  Pt with ED on viagra  Plan  -continue viagra 100mg prior to [place]    7. Vitamin D deficiency  - Vitamin D; Future  Pt with Vitamin D def due for screening:  Plan;  -Check vitamin D level, depending on level will give guidance on what dose to recommend per guidelines.       8. Essential hypertension  - losartan 25 MG Oral Tab; Take 1 tablet (25 mg total) by mouth at bedtime.  Dispense: 90 tablet; Refill: 1  Pt with HTN   Plan  -discontinue diuretic  -increase losartan to 25mg po nightly  -keep log of BP in am goal <130/80,   -follow up in 3 months with BP log and machine/medication, if above goal with home reads advised to return sooner    9. Pre-diabetes  Last A1c value was 6.3% done 3/16/2022.   Plan  -recheck A1C  -I recommend to eat a more balanced mediterranean diet (eat more vegetables and fruits) more omega 3 fatty acids, lean protein (chicken, turkey, seafood), less process/fried fatty foods, less red met, and mixed aerobic exercise 180 minutes a week and intermittent strength training. And follow up in 3 months     10. Atherosclerosis of native coronary artery of native heart without angina pectoris  Followed by [facility]    11. Rheumatoid arthritis involving multiple sites with positive rheumatoid factor (HCC)  Well controlled on biologics   Plan:  Continue regiment as prescribed by Rheum  Pt should get prevnar once in stock, pt declined flu and covid vaccine.        Follow Up:   Return in about 3 months (around 12/19/2024) for Blood pressure. .      Vijay Wren MD  Internal Medicine    I spent 55 minutes obtaining pertitent medical history, reviewing pertinent imaging/labs and specialists notes, evaluating patient, discussing differential diagnosis' and various treatment options, reinforcing importance of compliance with treatment plan, and completing documentation.     Encounter Times  PreCharting:   minutes    Reviewing/Obtaining:   minutes      Medical Exam:   minutes    Plan:   minutes      Notes:   minutes    Counseling/Education:   minutes      Referring/Communicating:   minutes    Ind Interpretation:   minutes      Care Coordination:   minutes       My total time spent caring for the patient on the day of the encounter:   minutes.          Patient asked to  sign release of information for outside records if not already requested, make future office/imaging appointments at the  prior to leaving, and to sign up for [facility] if not already active.  Preventive measures and further education discussed with patient as per after visit summary. Potential medication side effects discussed. All questions answered to best of ability.   Call office with any questions. Seek emergency care if necessary.   Patient understands and agrees to follow directions and advice.      ----------------------------------------- PATIENT INSTRUCTIONS-----------------------------------------     Patient Instructions   Controlling High Blood Pressure   High blood pressure (hypertension) is often called the silent killer. This is because many people who have it, don’t know it. It can be very dangerous. High blood pressure can raise your risk of heart attack, stroke, heart disease, and heart failure. Controlling your blood pressure can lower your risk of these problems. It's important to check yourblood pressure regularly. It can save your life.   Blood pressure measurements are given as 2 numbers. Systolic blood pressure is the upper number. This is the pressure when the heart contracts. Diastolic blood pressure is the lower number. This is the pressure when the heartrelaxes between beats.   Blood pressure is grouped like this:   Normal blood pressure. This is systolic of less than 120 and diastolic of less than 80 (120/80).  Elevated blood pressure.  This is systolic of 120 to 129 and diastolic less than 80.  Stage 1 high blood pressure.  This is systolic of 130 to 139 or diastolic between 80 to 89.  Stage 2 high blood pressure.  This is systolic of 140 or higher or diastolic of 90 or higher.  A heart-healthy lifestyle can help you control your blood pressure withoutmedicines. Below are some things you can do to have a heart-healthy lifestyle.     Eat heart-healthy foods   Choose low-salt,  low-fat foods. Limit your sodium to 2,300 mg per day or the amount advised by your healthcare provider.  Limit canned, dried, cured, packaged, and fast foods. These can contain a lot of salt.  Eat 8 to 10 servings of fruits and vegetables every day.  Choose lean meats, fish, or chicken.  Eat whole-grain pasta, brown rice, and beans.  Eat 2 to 3 servings of low-fat or fat-free dairy products.  Ask your doctor about the DASH eating plan. This plan helps reduce blood pressure.  When you go to a restaurant, ask that your meal be made with no added salt.    Stay at a healthy weight   Ask your healthcare provider how many calories to eat a day. Then stick to that number.  Ask your provider what weight range is healthiest for you. If you are overweight, a weight loss of only 3% to 5% of your body weight can help lower blood pressure. A good weight loss goal is to lose 10% of your body weight in a year.  Limit snacks and sweets.  Get regular exercise.    Get more active   Find activities you enjoy. They can be done alone or with friends or family. Try bicycling, dancing, walking, or jogging.  Park farther away from building entrances to walk more.  Use stairs instead of the elevator.  When you can, walk or bike instead of driving.  Staples leaves, garden, or do household repairs.  Be active at a moderate to vigorous level of physical activity for at least 30 minutes a day for at least 5 days a week.     Manage stress   Make time to relax and enjoy life. Find time to laugh.  Talk about your concerns with your loved ones and your healthcare provider.  Visit with family and friends, and keep up with hobbies.    Limit alcohol and quit smoking   Men should have no more than 2 drinks per day.  Women should have no more than 1 drink per day.  If you smoke, make a plan to stop. Talk with your healthcare provider for help. Smoking greatly raises your risk for heart disease and stroke. Ask your provider about stop-smoking programs and  other support.    Blood pressure medicines  If your lifestyle changes aren’t enough, your healthcare provider may prescribe high blood pressure medicine. Take all medicines as prescribed. If you have any questions about yourmedicines, ask your provider before stopping or changing them.   [employer] last reviewed this educational content on12/1/2021    © 3024-9303 [employer]. All rights reserved. This information is not intended as a substitute for professional medical care. Always follow yourhealthcare professional's instruction    Video HealthSheeAlterG™  What is High Blood Pressure?  Understand what blood pressure is, the health risks of having high blood pressure, the factors that put you at risk for having high blood pressure, and the importance of working with your healthcare provider to control it.  To watch the video:  Scan the QR code  Using your mobile device, scan the following code:  OR  Go to the website:  [employer]  Enter the prescription code:  3414E    © 0561-1944 [employer]. All rights reserved. This information is not intended as a substitute for professional medical care. Always follow your healthcare professional's instructions.    Video HealthSheets™  Eating Well with High Blood Pressure  Certain foods can make your blood pressure go too high. Watch and learn how easy it is to have delicious meals without harming your health.     To watch the video:  Scan the QR code  Using your mobile device, scan the following code:  OR  Go to the website:  wwwZonare Medical Systems  Enter the prescription code:   QIX       © 2000-2022 [employer]. All rights reserved. This information is not intended as a substitute for professional medical care. Always follow your healthcare professional's instructions.    Taking Your Blood Pressure  Blood pressure is the force of blood against the artery wall as it moves from the heart through the blood vessels. You can take  your own blood pressure reading using a digital monitor. Take your readings the same each time, usingthe same arm. Take readings as often as your healthcare provider advises.   About blood pressure monitors  Blood pressure monitors are designed for certain ages and cases. You can find monitors for older adults, for pregnant women, and for children. Make sure theone you choose is the right one for your age and situation.   Experts advise an automatic cuff monitor that fits on your upper arm (bicep). The cuff should fit your arm size. A cuff that’s too large or too small won't give an accurate reading. Measure around yourupper arm to find your size.   Monitors that attach to your finger or wrist are not as accurate as monitorsfor your upper arm.   Ask your healthcare provider for help in choosing a monitor. Bring your monitorto your next provider visit if you need help in using it the correct way.   The steps below are general instructions for using an automatic digitalmonitor.   Step 1. Relax    Take your blood pressure at the same time every day, such as in the morning or evening. Or take it at the time your healthcare provider advises.  Wait at least 30 minutes after smoking, eating, or exercising. Don't drink coffee, tea, soda, or other caffeinated drinks before checking your blood pressure. Use the restroom beforehand.  Sit comfortably at a table with both feet on the floor. Don't cross your legs or feet. Place the monitor near you.  Rest for at least 5 minutes before you begin. Make sure there are no distractions. This includes TV, cell phones, and other electronics. Wait to have conversations with others until after you measure you blood pressure.  Step 2. Wrap the cuff    Place your arm on the table, palm up. Your arm should be at the level of your heart. Wrap the cuff around your upper arm, just above your elbow. It’s best done on bare skin, not over clothing. Most cuffs will show you where the blood vessel  in the middle of the arm at the inner side of the elbow (the brachial artery) should line up with the cuff. Look in your monitor's instruction booklet for an illustration. You can also bring your cuff to your healthcare provider and have them show you how to correctly place the cuff.  Step 3. Inflate the cuff    Push the button that starts the pump.  The cuff will tighten, then loosen.  The numbers will change. When they stop changing, your blood pressure reading will appear.  Take 2 or 3 readings 1 minute apart, or as advised by your provider.  Step 4. Write down the results of each reading    Write down your blood pressure numbers for each reading. Note the date and time. Keep your results in 1 place, such as a notebook. Even if your monitor has a built-in memory, keep a hard copy of the readings.  Remove the cuff from your arm. Turn off the machine.  Bring your blood pressure records with you to each provider visit.  If you start a new blood pressure medicine, note the day you started the new medicine. Also note the day if you change the dose of your medicine. Measure your blood pressure before your take your medicine. This information goes on your blood pressure recording sheet. This will help your provider check how well the medicine changes are working.  Ask your provider what numbers mean that you should call them. Also ask what numbers mean that you should get help right away.  Piotr last reviewed this educational content on12/1/2021    © 9246-8712 [employer]. All rights reserved. This information is not intended as a substitute for professional medical care. Always follow yourhealthcare professional's instructions.                     The 21st Century Cures Act makes medical notes available to patients in the interest of transparency.  However, please be advised that this is a medical document.  It is intended as a peer to peer communication.  It is written in medical language and may  contain abbreviations or verbiage that are technical and unfamiliar.  It may appear blunt or direct.  Medical documents are intended to carry relevant information, facts as evident, and the clinical opinion of the practitioner.

## 2024-09-26 ENCOUNTER — HOSPITAL ENCOUNTER (OUTPATIENT)
Dept: CT IMAGING | Age: 64
Discharge: HOME | End: 2024-09-26
Attending: STUDENT IN AN ORGANIZED HEALTH CARE EDUCATION/TRAINING PROGRAM
Payer: COMMERCIAL

## 2024-09-26 DIAGNOSIS — Z00.00 ANNUAL PHYSICAL EXAM: ICD-10-CM

## 2024-09-26 DIAGNOSIS — F17.200 TOBACCO USE DISORDER: Chronic | ICD-10-CM

## 2024-09-26 PROCEDURE — 71271 CT THORAX LUNG CANCER SCR C-: CPT | Performed by: STUDENT IN AN ORGANIZED HEALTH CARE EDUCATION/TRAINING PROGRAM

## 2024-09-27 DIAGNOSIS — R91.8 MASS OF UPPER LOBE OF RIGHT LUNG: Primary | ICD-10-CM

## 2024-09-27 NOTE — PROGRESS NOTES
No action, I already called and discussed with patient:    Jordan Comer,   As we have discussed you have a This case will automatically be discussed in tumor board, which is Thursdays at 3 pm next one will be 10/3/24. I have already discussed with pulmonologist Dr. Lee who will get you in next week to clinic to see him and take next steps expedited as quickly as we can.  -Dr. Wren

## 2024-10-03 ENCOUNTER — OFFICE VISIT (OUTPATIENT)
Dept: PULMONOLOGY | Facility: CLINIC | Age: 64
End: 2024-10-03
Payer: COMMERCIAL

## 2024-10-03 ENCOUNTER — TELEPHONE (OUTPATIENT)
Dept: HEMATOLOGY/ONCOLOGY | Facility: HOSPITAL | Age: 64
End: 2024-10-03

## 2024-10-03 VITALS
HEART RATE: 69 BPM | BODY MASS INDEX: 33.64 KG/M2 | WEIGHT: 235 LBS | HEIGHT: 70 IN | SYSTOLIC BLOOD PRESSURE: 138 MMHG | OXYGEN SATURATION: 96 % | DIASTOLIC BLOOD PRESSURE: 74 MMHG | RESPIRATION RATE: 14 BRPM

## 2024-10-03 DIAGNOSIS — R91.1 LUNG NODULE: Primary | ICD-10-CM

## 2024-10-03 PROCEDURE — 99204 OFFICE O/P NEW MOD 45 MIN: CPT | Performed by: INTERNAL MEDICINE

## 2024-10-03 NOTE — TELEPHONE ENCOUNTER
Called patient with PET apt for 10/14/24 at 0730 am at [facility], green parking lot to [facility].  No carbs day before, NPO after MN.  He verbalizes understanding.

## 2024-10-03 NOTE — H&P
Referring Physician  Priscila Robins MD    Chief Complaint  Lung nodule    History of Present Illness  Patient is a 64-year-old male who presents to pulmonary clinic after incidental finding of right upper lobe 2.5 cm nodule seen on recent lung screening CT from 9/26/2024.  Admits to 30-pack-year history of tobacco abuse.  Occasional cigar smoking ongoing.  Denies significant worsening dyspnea cough.  Denies recent history of known pneumonia.  States he can tolerate walking 2 to 3 miles.  Mitts to history of rheumatoid arthritis.  Had been on methotrexate therapy in the past which was stopped approximately 1 month ago.  Currently on Hyrimoz.     Review of Systems  Constitutional: denies weight loss, fevers, chills, weakness, fatigue, recent illness  HEENT: denies epistaxis, sore throat, postnasal drip  Cardio: denies chest pain, chest pressure, palpitations  Respiratory: denies dyspnea, cough, wheezing, hemoptysis   GI: denies nausea, vomiting, abdominal pain  : denies dysuria, hematuria  Musculoskeletal: denies arthralgia, myalgia  Integumentary: denies rash, itching  Neurological: denies syncope, weakness, dizziness,   Psychiatric: denies depression, anxiety  Hematologic: denies bruising    Past Medical History  Past Medical History:    High cholesterol    Osteoarthritis        Surgical History  Past Surgical History:   Procedure Laterality Date    Colonoscopy N/A 8/25/2022    Procedure: COLONOSCOPY/ ESOPHAGOGASTRODUODENOSCOPY (EGD);  Surgeon: Kaden Briceño MD;  [facility] ENDO    Replacement total knee Left 12/17/2021    Dr. Behery       Family History  Family History   Problem Relation Age of Onset    Hypertension Father     Hypertension Mother         Social History  Tobacco: 2-pack-year history of tobacco abuse.  Occasional cigar  Alcohol: Denies significant intake  Illicit Drugs: Denies    Medications  Current Outpatient Medications on File Prior to Visit   Medication Sig Dispense Refill     losartan 25 MG Oral Tab Take 1 tablet (25 mg total) by mouth at bedtime. 90 tablet 1    rosuvastatin 10 MG Oral Tab Take 1 tablet (10 mg total) by mouth nightly. 90 tablet 1    Sildenafil Citrate 100 MG Oral Tab Take 1 tablet (100 mg total) by mouth daily as needed for Erectile Dysfunction. 30 tablet 11    tamsulosin 0.4 MG Oral Cap Take 1 capsule (0.4 mg total) by mouth daily. 90 capsule 1    Adalimumab-adaz (HYRIMOZ) 40 MG/0.4ML Subcutaneous Solution Auto-injector INJECT 1 PEN UNDER THE SKIN EVERY 14 DAYS 2 each 5     No current facility-administered medications on file prior to visit.       Allergies  Allergies   Allergen Reactions    Lisinopril Coughing       Physical Exam  Constitutional: no acute distress  HEENT: PERRL  Neck: supple, no JVD  Cardio: RRR, S1 S2  Respiratory: clear to auscultation bilaterally, no wheezing, rales, rhonchi, crackles  GI: abdomen soft, non tender, active bowel sounds, no organomegaly  Extremities: no clubbing, cyanosis, edema  Neurologic: no gross motor deficits  Skin: warm, dry  Lymphatic: no supraclavicular lymphadenopathy     Imaging  Lung screening CT performed on 9/26/2024 with 2.5 cm right upper lobe nodule seen with upper lobe emphysematous changes seen.    Pulmonary Function Testing  None available to review    Assessment  1.  Right lung nodule  2.  COPD  3.  Nicotine dependence  4.  Rheumatoid arthritis    Plan  -Patient presents today for pulm evaluation.  Lung screening CT from 9/26/2024 reviewed with evidence of 2.5 cm right upper lobe nodule seen with emphysematous changes present.  Discussed differential diagnosis of lung nodules which includes inflammatory, infectious and malignancy.  Given size and appearance of nodule recommend PET/CT.  Once I review PET/CT will make further recommendations  Potential biopsy if necessary.  -Patient prefers to hold off maintenance inhaler therapy at this time.  Encouraged tobacco cessation.      Feliz Lee, DO  Pulmonary  Critical Care Medicine  [facility]  10/3/2024  12:44 PM

## 2024-10-10 DIAGNOSIS — N52.9 ERECTILE DYSFUNCTION, UNSPECIFIED ERECTILE DYSFUNCTION TYPE: ICD-10-CM

## 2024-10-11 RX ORDER — SILDENAFIL 100 MG/1
100 TABLET, FILM COATED ORAL DAILY PRN
Qty: 9 TABLET | Refills: 0 | OUTPATIENT
Start: 2024-10-11

## 2024-10-14 ENCOUNTER — HOSPITAL ENCOUNTER (OUTPATIENT)
Dept: NUCLEAR MEDICINE | Facility: HOSPITAL | Age: 64
Discharge: HOME | End: 2024-10-14
Attending: INTERNAL MEDICINE
Payer: COMMERCIAL

## 2024-10-14 DIAGNOSIS — R91.1 LUNG NODULE: ICD-10-CM

## 2024-10-14 LAB — GLUCOSE BLDC GLUCOMTR-MCNC: 100 MG/DL (ref 70–99)

## 2024-10-14 PROCEDURE — 82962 GLUCOSE BLOOD TEST: CPT

## 2024-10-14 PROCEDURE — 78815 PET IMAGE W/CT SKULL-THIGH: CPT | Performed by: INTERNAL MEDICINE

## 2024-10-21 ENCOUNTER — PATIENT MESSAGE (OUTPATIENT)
Dept: PULMONOLOGY | Facility: CLINIC | Age: 64
End: 2024-10-21

## 2024-10-21 DIAGNOSIS — R91.1 LUNG NODULE: Primary | ICD-10-CM

## 2024-10-23 NOTE — TELEPHONE ENCOUNTER
Patient calling for test results.  Patient states that he has been waiting for results for almost 10 days.  Please call

## 2024-12-27 DIAGNOSIS — I10 ESSENTIAL HYPERTENSION: ICD-10-CM

## 2024-12-28 NOTE — TELEPHONE ENCOUNTER
[facility] also requesting:    rosuvastatin 10 MG Oral Tab, Take 1 tablet (10 mg total) by mouth nightly., Disp: 90 tablet, Rfl: 1

## 2025-01-01 RX ORDER — CHLORTHALIDONE 25 MG/1
25 TABLET ORAL DAILY
Qty: 90 TABLET | Refills: 1 | OUTPATIENT
Start: 2025-01-01

## 2025-01-01 NOTE — TELEPHONE ENCOUNTER
Rosuvastatin refill refused - refill too soon. 6 month supply sent 9/19/24      Outpatient Medication Detail     Disp Refills Start End    rosuvastatin 10 MG Oral Tab 90 tablet 1 9/19/2024 3/18/2025    Sig - Route: Take 1 tablet (10 mg total) by mouth nightly. - Oral    Sent to pharmacy as: Rosuvastatin Calcium 10 MG Oral Tablet (Crestor)    E-Prescribing Status: Receipt confirmed by pharmacy (9/19/2024  9:53 AM CDT)      Pharmacy    [facility] - [place], [place] - [address] [phone], [phone]        chlorthalidone 25 MG Oral Tab         The original prescription was discontinued on 9/19/2024 by Vijya Wren MD for the following reason: ** Dosage Changed, Please See New Prescription. Renewing this prescription may not be appropriate.   8. Essential hypertension  - losartan 25 MG Oral Tab; Take 1 tablet (25 mg total) by mouth at bedtime.  Dispense: 90 tablet; Refill: 1  Pt with HTN   Plan  -discontinue diuretic  -increase losartan to 25mg po nightly  -keep log of BP in am goal <130/80,   -follow up in 3 months with BP log and machine/medication, if above goal with home reads advised to return sooner    Chlorthalidone refill refused - discontinued at office visit 9/19/24

## 2025-01-23 DIAGNOSIS — I10 ESSENTIAL HYPERTENSION: ICD-10-CM

## 2025-01-23 RX ORDER — LOSARTAN POTASSIUM 25 MG/1
25 TABLET ORAL NIGHTLY
Qty: 30 TABLET | Refills: 0 | Status: SHIPPED | OUTPATIENT
Start: 2025-01-23

## 2025-01-23 NOTE — TELEPHONE ENCOUNTER
[employer], patient needs 30-day supply of medication to local pharmacy as mail order package was lost per carrier, pended for review. Thank you.    Spoke to Dorina AMBROSIO From [facility] (name and  of patient verified). She is calling to report mail service reported they lost patient's medication-- losartan 25 mg.  Patient is out of medication.  Requesting 30-day refill to [facility] [place] on [address] Phone: [phone]

## 2025-01-23 NOTE — TELEPHONE ENCOUNTER
REFILL PASSED PER [facility] PROTOCOLS    Requested Prescriptions   Pending Prescriptions Disp Refills    losartan 25 MG Oral Tab 30 tablet 0     Sig: Take 1 tablet (25 mg total) by mouth at bedtime.       Hypertension Medications Protocol Passed - 1/23/2025  2:41 PM        Passed - CMP or BMP in past 12 months        Passed - Last BP reading less than 140/90     BP Readings from Last 1 Encounters:   10/03/24 138/74               Passed - In person appointment or virtual visit in the past 12 mos or appointment in next 3 mos     Recent Outpatient Visits              3 months ago Lung nodule    [facility], [facility], [place] Feliz Lee DO    Office Visit    4 months ago Annual physical exam    [facility], Vijay Houston MD    Office Visit    5 months ago Rheumatoid arthritis of multiple sites without rheumatoid factor (HCC)    [facility] Alejandra Paiz MD    Office Visit    10 months ago Rheumatoid arthritis of multiple sites without rheumatoid factor (HCC)    [facility], Alejandra Paiz MD    Office Visit    1 year ago Rheumatoid arthritis of multiple sites without rheumatoid factor (HCC)    [facility] [place]    Office Visit          Future Appointments         Provider Department Appt Notes    Tomorrow [facility] CT [facility] [facility] CT - [place] Compare to the previous scan and PET scan to monitor a nodule in the upper lobe of my right lung.    In 2 weeks Alejandra Hayes MD [facility] 5 mo follow up                    Passed - EGFRCR or GFRNAA > 50     GFR Evaluation  EGFRCR: 67 , resulted on 9/19/2024          Passed - Medication is active on med list             Future Appointments         Provider Department Appt Notes    Tomorrow [facility] CT [address] [place] [address] CT -  [place] Compare to the previous scan and PET scan to monitor a nodule in the upper lobe of my right lung.    In 2 weeks Alejandra Hayes MD [facility], [facility] 5 mo follow up          Recent Outpatient Visits              3 months ago Lung nodule    [facility], [facility] Feliz Lee DO    Office Visit    4 months ago Annual physical exam    [facility]    Office Visit    5 months ago Rheumatoid arthritis of multiple sites without rheumatoid factor (HCC)    [facility]    Office Visit    10 months ago Rheumatoid arthritis of multiple sites without rheumatoid factor (HCC)    [facility] [place] Alejandra Paiz MD    Office Visit    1 year ago Rheumatoid arthritis of multiple sites without rheumatoid factor (HCC)    [facility] ([place] [place]), Alejandra Paiz MD    Office Visit

## 2025-01-24 ENCOUNTER — HOSPITAL ENCOUNTER (OUTPATIENT)
Dept: CT IMAGING | Age: 65
Discharge: HOME | End: 2025-01-24
Attending: INTERNAL MEDICINE
Payer: COMMERCIAL

## 2025-01-24 DIAGNOSIS — R91.1 LUNG NODULE: ICD-10-CM

## 2025-01-24 LAB
CREAT BLD-MCNC: 1.3 MG/DL
EGFRCR SERPLBLD CKD-EPI 2021: 60 ML/MIN/1.73M2 (ref 60–?)

## 2025-01-24 PROCEDURE — 71260 CT THORAX DX C+: CPT | Performed by: INTERNAL MEDICINE

## 2025-01-24 PROCEDURE — 82565 ASSAY OF CREATININE: CPT

## 2025-01-30 DIAGNOSIS — R91.1 LUNG NODULE: Primary | ICD-10-CM

## 2025-02-10 ENCOUNTER — LAB ENCOUNTER (OUTPATIENT)
Dept: LAB | Age: 65
End: 2025-02-10
Attending: INTERNAL MEDICINE
Payer: COMMERCIAL

## 2025-02-10 ENCOUNTER — TELEPHONE (OUTPATIENT)
Dept: RHEUMATOLOGY | Facility: CLINIC | Age: 65
End: 2025-02-10

## 2025-02-10 DIAGNOSIS — Z51.81 MEDICATION MONITORING ENCOUNTER: Primary | ICD-10-CM

## 2025-02-10 DIAGNOSIS — M06.09 RHEUMATOID ARTHRITIS OF MULTIPLE SITES WITHOUT RHEUMATOID FACTOR (HCC): ICD-10-CM

## 2025-02-10 DIAGNOSIS — Z51.81 MEDICATION MONITORING ENCOUNTER: ICD-10-CM

## 2025-02-10 LAB
ALBUMIN SERPL-MCNC: 4.3 G/DL (ref 3.2–4.8)
ALT SERPL-CCNC: 42 U/L
AST SERPL-CCNC: 28 U/L (ref ?–34)
BASOPHILS # BLD AUTO: 0.05 X10(3) UL (ref 0–0.2)
BASOPHILS NFR BLD AUTO: 0.6 %
CREAT BLD-MCNC: 1.08 MG/DL
CRP SERPL-MCNC: 0.5 MG/DL (ref ?–1)
DEPRECATED RDW RBC AUTO: 50.5 FL (ref 35.1–46.3)
EGFRCR SERPLBLD CKD-EPI 2021: 75 ML/MIN/1.73M2 (ref 60–?)
EOSINOPHIL # BLD AUTO: 0.25 X10(3) UL (ref 0–0.7)
EOSINOPHIL NFR BLD AUTO: 3 %
ERYTHROCYTE [DISTWIDTH] IN BLOOD BY AUTOMATED COUNT: 14.5 % (ref 11–15)
ERYTHROCYTE [SEDIMENTATION RATE] IN BLOOD: 47 MM/HR
HCT VFR BLD AUTO: 44.1 %
HGB BLD-MCNC: 14.7 G/DL
IMM GRANULOCYTES # BLD AUTO: 0.02 X10(3) UL (ref 0–1)
IMM GRANULOCYTES NFR BLD: 0.2 %
LYMPHOCYTES # BLD AUTO: 2.64 X10(3) UL (ref 1–4)
LYMPHOCYTES NFR BLD AUTO: 31.9 %
MCH RBC QN AUTO: 31.3 PG (ref 26–34)
MCHC RBC AUTO-ENTMCNC: 33.3 G/DL (ref 31–37)
MCV RBC AUTO: 94 FL
MONOCYTES # BLD AUTO: 1.03 X10(3) UL (ref 0.1–1)
MONOCYTES NFR BLD AUTO: 12.4 %
NEUTROPHILS # BLD AUTO: 4.29 X10 (3) UL (ref 1.5–7.7)
NEUTROPHILS # BLD AUTO: 4.29 X10(3) UL (ref 1.5–7.7)
NEUTROPHILS NFR BLD AUTO: 51.9 %
PLATELET # BLD AUTO: 385 10(3)UL (ref 150–450)
PLATELETS.RETICULATED NFR BLD AUTO: 1.6 % (ref 0–7)
RBC # BLD AUTO: 4.69 X10(6)UL
WBC # BLD AUTO: 8.3 X10(3) UL (ref 4–11)

## 2025-02-10 PROCEDURE — 36415 COLL VENOUS BLD VENIPUNCTURE: CPT

## 2025-02-10 PROCEDURE — 82565 ASSAY OF CREATININE: CPT

## 2025-02-10 PROCEDURE — 85025 COMPLETE CBC W/AUTO DIFF WBC: CPT

## 2025-02-10 PROCEDURE — 84460 ALANINE AMINO (ALT) (SGPT): CPT

## 2025-02-10 PROCEDURE — 86140 C-REACTIVE PROTEIN: CPT

## 2025-02-10 PROCEDURE — 85652 RBC SED RATE AUTOMATED: CPT

## 2025-02-10 PROCEDURE — 84450 TRANSFERASE (AST) (SGOT): CPT

## 2025-02-10 PROCEDURE — 82040 ASSAY OF SERUM ALBUMIN: CPT

## 2025-02-11 ENCOUNTER — OFFICE VISIT (OUTPATIENT)
Dept: RHEUMATOLOGY | Facility: CLINIC | Age: 65
End: 2025-02-11
Payer: COMMERCIAL

## 2025-02-11 VITALS
HEIGHT: 70 IN | DIASTOLIC BLOOD PRESSURE: 77 MMHG | SYSTOLIC BLOOD PRESSURE: 167 MMHG | BODY MASS INDEX: 36.08 KG/M2 | HEART RATE: 68 BPM | WEIGHT: 252 LBS

## 2025-02-11 DIAGNOSIS — M06.09 RHEUMATOID ARTHRITIS OF MULTIPLE SITES WITHOUT RHEUMATOID FACTOR (HCC): Primary | ICD-10-CM

## 2025-02-11 DIAGNOSIS — Z51.81 MEDICATION MONITORING ENCOUNTER: ICD-10-CM

## 2025-02-11 PROCEDURE — 99214 OFFICE O/P EST MOD 30 MIN: CPT | Performed by: INTERNAL MEDICINE

## 2025-02-11 RX ORDER — ADALIMUMAB-ADAZ 40 MG/.4ML
1 INJECTION, SOLUTION SUBCUTANEOUS
Qty: 2 EACH | Refills: 5 | Status: SHIPPED | OUTPATIENT
Start: 2025-02-11

## 2025-02-11 NOTE — PROGRESS NOTES
Cody Savage is a 65 year old male.    HPI:     Chief Complaint   Patient presents with    Rheumatoid Arthritis     5 mo follow up RA       I had the pleasure of seeing Cody Savage on 2/11/2025 for follow up Seronegative RA/Spondyloarthritis.     Current medications:  Hyrimoz every 2 weeks- started 8/11/2022  Previous medications:  Prednisone- end July 2022-11/2022  Methotrexate 0.4 mL injections weekly- started 8/9/2022- stopped 7/2024 due to elevated LFTs  Blood work:  Neg VALENCIA, RF, CCP, HLA-B27, CK  , >130    Interval History:  This is a 63 yo M with hx of HTN, B/L TKR (Dec 2021 and March 2022), L hand Carpal tunnel release (April 2022) presents with polyarthralgias.  About 1 year ago around June 2021 he was doing really well.  Had very minimal joint pain.  In June 2021 they sold their house at that time started having worsening joint pain in his knees.  Mostly his left knee.  X-rays of the knees showed significant medial compartment narrowing in the left knee and moderate medial compartment in the right knee.  He then had his left knee replaced.  Overall did pretty good.  In March he had his right knee replaced and since then has had worsening joint pain overall.  He fell out of bed and dislocated his right shoulder in March, he had a shoulder reduction but was seen by orthopedic and they are recommending surgery.  He has chronic left shoulder pain from wear-and-tear from his job.  He worked as a  for his entire career.  He is also supposed to be having left shoulder surgery.  He then started to have worsening pain in his hands.  He had swelling in his right wrist.  Swelling in his right wrist has been since December 2021.  He also had left elbow pain and swelling.  He received a cortisone shot in his right wrist and left elbow in April 2022.  It did help some of his symptoms.  He had some numbness and tingling in his hands also.  He had an EMG done around April 2022 and per PCPs note he  was diagnosed with neuropathy in both upper extremities.  He then had left carpal tunnel release surgery in April 2022.  The most recent joint pain has been his right wrist and left elbow.  Blood work in April 2022 did show elevated , repeat in May was >130.  Denies any history of psoriasis, temporal pain, headache, jaw pain or blurry vision  Reviewing his right knee synovial biopsy from March 2022, findings are suggestive of rheumatoid arthritis    6/29/20022:  Presents for f/u of Seronegative RA  Recent blood work showed ESR>130 and CRP 15.1 mg/dL  Negative RF, CCP and HLA-B27  X-ray of the right wrist does show marked joint space narrowing and 2 erosions on the lateral cortex of the scaphoid, erosive changes on the ulnar styloid  Has been on prednisone for about 2 weeks now, on prednisone 10 mg daily  Now  Joints feel 40% better. Has some mild pain in the hands and also some numbness in the L hand. R hand is doing better, mild pain in the wrist   Shoulder have chronic pain, r shoulder abduction limited to 30 degrees and left shoulder abduction limited to 90 degrees  Knees are doing better, can walk a mile   Also in PT twice a week for the knees and the shoulders  Had a cortisone shot in R shoulder last week and helped it a lot     8/8/2022:  Presents for f/u of Seronegative RA  Recent blood work showed ESR>130 and CRP 15.1 mg/dL  Negative RF, CCP and HLA-B27  X-ray of the right wrist does show marked joint space narrowing and 2 erosions on the lateral cortex of the scaphoid, erosive changes on the ulnar styloid  Remains on prednisone 10 mg daily, decreased to 5 mg but had more s/e  Having a lot of joint pain and swelling and difficulty ambulating due to his pain  Reports swelling in both hands, fingers, right wrist and right ankle.  Also having pain in both knees  Patient wife states that his symptoms have worsened significantly    9/20/2022:   Presents for follow-up of seronegative RA/spondylarthritis  He  was started on Humira and methotrexate back in August.  He also remains on prednisone 10 mg daily  Joint pain has significantly improved  He is able to do his own ADLs.  Can walk 5000 steps a day.  Hands are better.   Wife states that during his last visit he was not able to do anything for himself, not able to do his own ADLs  States that he can make a fist now.  Right ankle swelling improved  Seen by orthopedic and planning on having his shoulder surgery at the end of November 12/20/2022:   Presents for follow-up of seronegative RA/spondylarthritis  Currently on methotrexate 8 pills weekly and Humira every 2 weeks  Has been off of prednisone now since November  When tapered off prednisone has some worsening joint pain for about 1 week and then joints got better.  Having some pain and swelling in the ankles, R is worse than L  Ankles are more swollen, less pain. When wake up in AM no swelling in ankles but as days goes on swelling worsens.  He was on a diuretic, chlorthalidone but stopped a month ago. prescription ran out and was not renew.   Has been really active, walking 3 times a week. Also has been golfing and bowling.  Has been getting more nauseus and has been having some headaches.     3/15/2023:   Presents for follow-up of seronegative RA/spondylarthritis  Currently on methotrexate 0.8 mL injecitons weekly and Humira every 2 weeks  Was switched from oral to injection methotrexate last visit  Having some clicking and popping in ankles and hips. mostly the left hip going up to the left gluteal region  Also working out at gym 4 days a week, walks for 30 min and some weights. Some soreness in the left elbow  r shoulder is doing better, less pain. Able to raise it now  Hands and wrist doing good, can make a fist    7/19/2023:   Presents for follow-up of seronegative RA/spondylarthritis  Currently on methotrexate 0.8 mL injecitons weekly and Humira every 2 weeks  Has been bike riding, 17 miles and doing  well  Minimal joint pain  Doing well overall    11/6/2023:   Presents for follow-up of seronegative RA/spondylarthritis  Currently on methotrexate 0.6 mL injecitons weekly and Humira every 2 weeks  Joints are pretty stable  Continues to stay active, biked 20 miles yesterday.   R shoulder is good some mild pain in the left shoulder    3/18/2024:   Presents for follow-up of seronegative RA/spondylarthritis  Currently on methotrexate 0.4 mL injecitons weekly and Humira every 2 weeks, he will be transitioning to Hyrimoz every 2 weeks  Joints are pretty stable  Continues to stay active, continues to bike  R shoulder is good some mild pain in the left shoulder    8/21/2024:   Presents for follow-up of seronegative RA/spondylarthritis  Is now on Hyrimoz every 2 weeks  Methotrexate was discontinued due to elevated liver enzymes, this was about 1 month ago  Recent blood work in July showing slightly elevated LFTs  Minimal joint pain  Staying active, fishing, bowling, biking    2/11/2025:   Presents for follow-up of seronegative RA/spondylarthritis  On Hyrimoz every 2 weeks  Off methotrexate by 2024 due to elevated LFTs  Recent blood work showing normal kidney and liver tests.  ESR slightly high at 47  Has minimal joint pain  He is staying active and going to gym, he does treadmill and some weights. Also watches [employer]   He had a screening CT done showing a lung nodule, PET scan was done showing mild hypermetabilsm in peripheral opacity of RUL. Repeat CT chest showed stable right apical nodule  He is seeing pulmonology and will be repeating CT in 6 mos   Has been having high BP, following with PCP and medications are being adjusted                       HISTORY:  Past Medical History:    High cholesterol    Osteoarthritis      Social Hx Reviewed   Family Hx Reviewed     Medications (Active prior to today's visit):  Current Outpatient Medications   Medication Sig Dispense Refill    losartan 25 MG Oral Tab Take 1 tablet (25  mg total) by mouth at bedtime. 30 tablet 0    rosuvastatin 10 MG Oral Tab Take 1 tablet (10 mg total) by mouth nightly. 90 tablet 1    Sildenafil Citrate 100 MG Oral Tab Take 1 tablet (100 mg total) by mouth daily as needed for Erectile Dysfunction. 30 tablet 11    tamsulosin 0.4 MG Oral Cap Take 1 capsule (0.4 mg total) by mouth daily. 90 capsule 1    Adalimumab-adaz (HYRIMOZ) 40 MG/0.4ML Subcutaneous Solution Auto-injector INJECT 1 PEN UNDER THE SKIN EVERY 14 DAYS 2 each 5     .cmed  Allergies:  Allergies   Allergen Reactions    Lisinopril Coughing         ROS:   All other ROS are negative.     PHYSICAL EXAM:   GEN: AAOx3, NAD  HEENT: EOMI, PERRLA, no injection or icterus, oral mucosa moist, no oral lesions. No lymphadenopathy. No facial rash  CVS: RRR, no murmurs rubs or gallops. Equal 2+ distal pulses.   LUNGS: CTAB, no increased work of breathing  ABDOMEN:  soft NT/ND, +BS, no HSM  SKIN: No rashes or skin lesions. No nail findings  MSK:  Cervical spine: FROM  Hands: Swelling improved, has happened Nicky's nodes.  Able to make a full fist  Wrist: no swelling now  Elbow: FROM, no pain or swelling or warmth on palpation  Shoulders: FROM in both shoulders   Hip: normal log roll, no lateral hip pain, JOSE ALBERTO test negative b/l  Knees: FROM, no warmth or effusion present. No pain with ROM.   Ankles: R ankle no swelling  Feet: no pain with MTP squeeze, no toe swelling or pain or warmth on palpation with FROM  Spine: no lumbar or sacral pain on palpation.  EXT: 1+edema in LE   NEURO: Cranial nerves II-XII intact grossly. 5/5 strength throughout in both upper and lower extremities, sensation intact.  PSYCH: normal mood       LABS:     Component      Latest Ref Rng & Units 6/13/2022   QUANTIFERON(R)-TB GOLD PLUS, 1 TUBE      NEGATIVE NEGATIVE   NIL      IU/mL 0.02   MITOGEN-NIL      IU/mL >10.00   TB1-NIL      IU/mL 0.01   TB2-NIL      IU/mL 0.01   HCV AB      NON-REACTIVE NON-REACTIVE   SIGNAL TO CUT-OFF      <1.00 0.21    C-REACTIVE PROTEIN      <8.0 mg/L 151.8 (H)   CYCLIC CITRULLINATED$PEPTIDE (CCP) AB (IGG)      UNITS <16   SED RATE BY MODIFIED$WESTERGREN      < OR = 20 mm/h >130 (H)   HLA-B27 ANTIGEN      NEGATIVE NEGATIVE   HBSAg Screen      NON-REACTIVE NON-REACTIVE   HEPATITIS B SURFACE$ANTIBODY QL      NON-REACTIVE REACTIVE (A)   HEPATITIS B CORE AB TOTAL      NON-REACTIVE NON-REACTIVE     Imaging:     XR R wrist:  BONES: There is mild narrowing of the radial scaphoid joint space.  There is marked narrowing of the joint space between the lunate and capitate with associated subchondral sclerosis.  Two erosions project along the lateral cortex of the scaphoid   measuring 3 mm.  There is a 4 mm erosion along the medial triquetrum.  There is erosive change along the tip of the ulnar styloid.   SOFT TISSUES: Negative. No visible soft tissue swelling.   EFFUSION: None visible.   OTHER: Negative.     ASSESSMENT/PLAN:     Seronegative RA/spondylarthritis- improved  - X-ray of the right wrist showed evidence of marked narrowing with erosions. Also right knee synovial biopsy in March 2022 was suggestive of rheumatoid arthritis. Elevated ESR and CRP  - He was switched to Hyrimoz every 2 weeks and doing well  - Also off methotrexate 7/2024 due to high LFTs, LFTs now normal   - Joint pain is improved significantly on this regimen  - Blood work every next month and every 3 mos     Bilateral knee replacement secondary to OA  - Again right knee synovial biopsy did show evidence of inflammation, possible suggestive of RA     Left carpal tunnel release- stable  - Symptoms are stable.  Continues to have some numbness and tingling  - will use wrist splint  Right shoulder dislocation- improved, stable   - He dislocated his right shoulder back in March 2022, does not want surgery now, doing well   - ROM has improved, less painful now   Chronic left shoulder pain. Improved   - Following with orthopedic, has been ongoing for the past 10  years.    Pt will f/u in 6 mos     There is a longitudinal care relationship with me, the care plan reflects the ongoing nature of the continuous relationship of care, and the medical record indicates that there is ongoing treatment of a serious/complex medical condition which I am currently managing.  is Applicable.     Alejandra Hayes MD  2/11/2025  10:40 AM

## 2025-02-18 RX ORDER — ADALIMUMAB-ADAZ 40 MG/.4ML
1 INJECTION, SOLUTION SUBCUTANEOUS
Refills: 5 | OUTPATIENT
Start: 2025-02-18

## 2025-03-07 RX ORDER — TAMSULOSIN HYDROCHLORIDE 0.4 MG/1
0.4 CAPSULE ORAL DAILY
Qty: 90 CAPSULE | Refills: 1 | Status: SHIPPED | OUTPATIENT
Start: 2025-03-07

## 2025-03-27 RX ORDER — ROSUVASTATIN CALCIUM 10 MG/1
10 TABLET, COATED ORAL NIGHTLY
Qty: 90 TABLET | Refills: 3 | Status: SHIPPED | OUTPATIENT
Start: 2025-03-27

## 2025-04-21 ENCOUNTER — PATIENT MESSAGE (OUTPATIENT)
Dept: INTERNAL MEDICINE CLINIC | Facility: CLINIC | Age: 65
End: 2025-04-21

## 2025-04-21 ENCOUNTER — PATIENT MESSAGE (OUTPATIENT)
Age: 65
End: 2025-04-21

## 2025-04-22 NOTE — TELEPHONE ENCOUNTER
Please advise. Patient will need a PA on Humira if this will be his medication treatment. Patient will be on Medicare 5/1/2025.

## 2025-04-27 RX ORDER — ADALIMUMAB 40MG/0.4ML
40 KIT SUBCUTANEOUS
Qty: 2 EACH | Refills: 0 | Status: SHIPPED | OUTPATIENT
Start: 2025-04-27

## 2025-04-29 ENCOUNTER — TELEPHONE (OUTPATIENT)
Age: 65
End: 2025-04-29

## 2025-04-29 NOTE — TELEPHONE ENCOUNTER
Current Outpatient Medications   Medication Sig Dispense Refill    Adalimumab (HUMIRA, 2 PEN,) 40 MG/0.4ML Subcutaneous Auto-injector Kit Inject 40 mg CBA into the skin every 14 (fourteen) days. 2 each 0       Key: [id number]

## 2025-05-02 NOTE — TELEPHONE ENCOUNTER
PA Approved    Prior authorization for: Humira     Medication form: 40 mg pen     Approval #: CaseId:[id number]    Approved dates: Prior Auth;Coverage Start Date:05/01/2025;Coverage End Date:12/31/2025;    Pharmacy for medication:

## 2025-05-02 NOTE — TELEPHONE ENCOUNTER
PA start ([employer])    Prior authorization for: Humira    Medication form: 40 mg pen     Submission method: Xelerated    Tracking #:    [id number] result: ordered 2/11/25    Component      Latest Ref Rng 7/12/2024   Quantiferon TB NIL      IU/mL 0.05    Quantiferon-TB1 Minus NIL      IU/mL -0.01    Quantiferon-TB2 Minus NIL      IU/mL -0.01    Quantiferon TB Mitogen minus NIL      IU/mL 0.11    Quantiferon TB Result      Negative  Indeterminate

## 2025-05-05 NOTE — TELEPHONE ENCOUNTER
Spoke with spouse and aware office spoke to pharmacy. She is aware pharmacy is working on getting medication. She will call office if there are any issues.

## 2025-05-19 DIAGNOSIS — I10 ESSENTIAL HYPERTENSION: ICD-10-CM

## 2025-05-20 RX ORDER — LOSARTAN POTASSIUM 25 MG/1
25 TABLET ORAL NIGHTLY
Qty: 90 TABLET | Refills: 0 | Status: SHIPPED | OUTPATIENT
Start: 2025-05-20

## 2025-05-20 NOTE — TELEPHONE ENCOUNTER
Please review. Protocol Failed; No Protocol    Please see patients MyChart message:      losartan 25 MG Oral Tab [Vijayshantell Pickettmirellarupali]      Patient Comment: We are leaving for vacation Thursday and I need a refill of this medication. My wife and I just switched to Medicare effective May 1st. Can you please place an order with the [facility] at [address], [place]: [phone]. Please let me know if you need anything further. Medicare number is [id number]. Thank you.

## 2025-05-20 NOTE — TELEPHONE ENCOUNTER
Per patient he needs as soon as possible his prescription medication due to he is leaving out of town by Thursday 05/22/2025.    Current Medications[1]    losartan 25 MG Oral Tab Take 1 tablet (25 mg total) by mouth at bedtime. 30 tablet 0          [1]   Current Outpatient Medications   Medication Sig Dispense Refill    Adalimumab (HUMIRA, 2 PEN,) 40 MG/0.4ML Subcutaneous Auto-injector Kit Inject 40 mg CBA into the skin every 14 (fourteen) days. 6 each 0    rosuvastatin 10 MG Oral Tab Take 1 tablet (10 mg total) by mouth nightly. 90 tablet 3    tamsulosin 0.4 MG Oral Cap Take 1 capsule (0.4 mg total) by mouth daily. 90 capsule 1    Adalimumab-adaz (HYRIMOZ) 40 MG/0.4ML Subcutaneous Solution Auto-injector Inject 1 Pen into the skin every 14 (fourteen) days. 2 each 5    losartan 25 MG Oral Tab Take 1 tablet (25 mg total) by mouth at bedtime. 30 tablet 0    Sildenafil Citrate 100 MG Oral Tab Take 1 tablet (100 mg total) by mouth daily as needed for Erectile Dysfunction. 30 tablet 11

## 2025-07-24 ENCOUNTER — PATIENT MESSAGE (OUTPATIENT)
Age: 65
End: 2025-07-24

## 2025-07-24 RX ORDER — ADALIMUMAB 40MG/0.4ML
40 KIT SUBCUTANEOUS
Qty: 6 EACH | Refills: 0 | Status: SHIPPED | OUTPATIENT
Start: 2025-07-24

## 2025-07-24 NOTE — TELEPHONE ENCOUNTER
LOV: 2/11/25  Future Appointments   Date Time Provider Department Center   8/11/2025  9:00 AM [facility] CT [facility] [place]   8/25/2025 10:40 AM Alejandra Hayes MD [facility]       ASSESSMENT/PLAN:      Seronegative RA/spondylarthritis- improved  - X-ray of the right wrist showed evidence of marked narrowing with erosions. Also right knee synovial biopsy in March 2022 was suggestive of rheumatoid arthritis. Elevated ESR and CRP  - He was switched to Hyrimoz every 2 weeks and doing well  - Also off methotrexate 7/2024 due to high LFTs, LFTs now normal   - Joint pain is improved significantly on this regimen  - Blood work every next month and every 3 mos      Bilateral knee replacement secondary to OA  - Again right knee synovial biopsy did show evidence of inflammation, possible suggestive of RA     Left carpal tunnel release- stable  - Symptoms are stable.  Continues to have some numbness and tingling  - will use wrist splint  Right shoulder dislocation- improved, stable   - He dislocated his right shoulder back in March 2022, does not want surgery now, doing well   - ROM has improved, less painful now   Chronic left shoulder pain. Improved   - Following with orthopedic, has been ongoing for the past 10 years.     Pt will f/u in 6 mos      There is a longitudinal care relationship with me, the care plan reflects the ongoing nature of the continuous relationship of care, and the medical record indicates that there is ongoing treatment of a serious/complex medical condition which I am currently managing.  is Applicable.      Alejandra Hayes MD  2/11/2025  10:40 AM

## 2025-08-11 ENCOUNTER — HOSPITAL ENCOUNTER (OUTPATIENT)
Dept: CT IMAGING | Age: 65
Discharge: HOME | End: 2025-08-11
Attending: INTERNAL MEDICINE

## 2025-08-11 DIAGNOSIS — R91.1 LUNG NODULE: ICD-10-CM

## 2025-08-11 LAB
CREAT BLD-MCNC: 1.2 MG/DL (ref 0.7–1.3)
EGFRCR SERPLBLD CKD-EPI 2021: 66 ML/MIN/1.73M2 (ref 60–?)

## 2025-08-11 PROCEDURE — 71260 CT THORAX DX C+: CPT | Performed by: INTERNAL MEDICINE

## 2025-08-11 PROCEDURE — 82565 ASSAY OF CREATININE: CPT

## 2025-08-12 DIAGNOSIS — I10 ESSENTIAL HYPERTENSION: ICD-10-CM

## 2025-08-16 RX ORDER — LOSARTAN POTASSIUM 25 MG/1
25 TABLET ORAL NIGHTLY
Qty: 90 TABLET | Refills: 0 | Status: SHIPPED | OUTPATIENT
Start: 2025-08-16

## 2025-08-20 ENCOUNTER — LAB ENCOUNTER (OUTPATIENT)
Dept: LAB | Age: 65
End: 2025-08-20
Attending: INTERNAL MEDICINE

## 2025-08-20 DIAGNOSIS — Z51.81 MEDICATION MONITORING ENCOUNTER: ICD-10-CM

## 2025-08-20 DIAGNOSIS — M06.09 RHEUMATOID ARTHRITIS OF MULTIPLE SITES WITHOUT RHEUMATOID FACTOR (HCC): ICD-10-CM

## 2025-08-20 LAB
ALBUMIN SERPL-MCNC: 4.7 G/DL (ref 3.2–4.8)
ALT SERPL-CCNC: 49 U/L (ref 10–49)
AST SERPL-CCNC: 36 U/L (ref ?–34)
BASOPHILS # BLD AUTO: 0.05 X10(3) UL (ref 0–0.2)
BASOPHILS NFR BLD AUTO: 0.5 %
CREAT BLD-MCNC: 1.25 MG/DL (ref 0.7–1.3)
CRP SERPL-MCNC: 0.7 MG/DL (ref ?–0.5)
DEPRECATED RDW RBC AUTO: 50.9 FL (ref 35.1–46.3)
EGFRCR SERPLBLD CKD-EPI 2021: 63 ML/MIN/1.73M2 (ref 60–?)
EOSINOPHIL # BLD AUTO: 0.18 X10(3) UL (ref 0–0.7)
EOSINOPHIL NFR BLD AUTO: 1.9 %
ERYTHROCYTE [DISTWIDTH] IN BLOOD BY AUTOMATED COUNT: 14 % (ref 11–15)
ERYTHROCYTE [SEDIMENTATION RATE] IN BLOOD: 30 MM/HR (ref 0–20)
HCT VFR BLD AUTO: 47.3 % (ref 39–53)
HGB BLD-MCNC: 15.6 G/DL (ref 13–17.5)
IMM GRANULOCYTES # BLD AUTO: 0.03 X10(3) UL (ref 0–1)
IMM GRANULOCYTES NFR BLD: 0.3 %
LYMPHOCYTES # BLD AUTO: 3.21 X10(3) UL (ref 1–4)
LYMPHOCYTES NFR BLD AUTO: 33.3 %
MCH RBC QN AUTO: 32.2 PG (ref 26–34)
MCHC RBC AUTO-ENTMCNC: 33 G/DL (ref 31–37)
MCV RBC AUTO: 97.5 FL (ref 80–100)
MONOCYTES # BLD AUTO: 0.8 X10(3) UL (ref 0.1–1)
MONOCYTES NFR BLD AUTO: 8.3 %
NEUTROPHILS # BLD AUTO: 5.36 X10 (3) UL (ref 1.5–7.7)
NEUTROPHILS # BLD AUTO: 5.36 X10(3) UL (ref 1.5–7.7)
NEUTROPHILS NFR BLD AUTO: 55.7 %
PLATELET # BLD AUTO: 346 10(3)UL (ref 150–450)
RBC # BLD AUTO: 4.85 X10(6)UL (ref 3.8–5.8)
WBC # BLD AUTO: 9.6 X10(3) UL (ref 4–11)

## 2025-08-20 PROCEDURE — 85025 COMPLETE CBC W/AUTO DIFF WBC: CPT

## 2025-08-20 PROCEDURE — 84460 ALANINE AMINO (ALT) (SGPT): CPT

## 2025-08-20 PROCEDURE — 82565 ASSAY OF CREATININE: CPT

## 2025-08-20 PROCEDURE — 86480 TB TEST CELL IMMUN MEASURE: CPT

## 2025-08-20 PROCEDURE — 86140 C-REACTIVE PROTEIN: CPT

## 2025-08-20 PROCEDURE — 85652 RBC SED RATE AUTOMATED: CPT

## 2025-08-20 PROCEDURE — 82040 ASSAY OF SERUM ALBUMIN: CPT

## 2025-08-20 PROCEDURE — 84450 TRANSFERASE (AST) (SGOT): CPT

## 2025-08-20 PROCEDURE — 36415 COLL VENOUS BLD VENIPUNCTURE: CPT

## 2025-08-22 LAB
M TB IFN-G CD4+ T-CELLS BLD-ACNC: 0.03 IU/ML
M TB TUBERC IFN-G BLD QL: NEGATIVE
M TB TUBERC IGNF/MITOGEN IGNF CONTROL: >10 IU/ML
QFT TB1 AG MINUS NIL: 0.08 IU/ML
QFT TB2 AG MINUS NIL: 0.12 IU/ML

## 2025-08-25 ENCOUNTER — OFFICE VISIT (OUTPATIENT)
Age: 65
End: 2025-08-25

## 2025-08-25 VITALS
HEIGHT: 70 IN | SYSTOLIC BLOOD PRESSURE: 128 MMHG | WEIGHT: 250 LBS | BODY MASS INDEX: 35.79 KG/M2 | DIASTOLIC BLOOD PRESSURE: 74 MMHG | HEART RATE: 69 BPM

## 2025-08-25 DIAGNOSIS — M06.09 RHEUMATOID ARTHRITIS OF MULTIPLE SITES WITHOUT RHEUMATOID FACTOR (HCC): Primary | ICD-10-CM

## 2025-08-25 DIAGNOSIS — Z51.81 MEDICATION MONITORING ENCOUNTER: ICD-10-CM

## 2025-08-25 PROCEDURE — 99214 OFFICE O/P EST MOD 30 MIN: CPT | Performed by: INTERNAL MEDICINE

## 2025-08-25 PROCEDURE — G2211 COMPLEX E/M VISIT ADD ON: HCPCS | Performed by: INTERNAL MEDICINE

## 2025-08-25 RX ORDER — ADALIMUMAB 40MG/0.4ML
40 KIT SUBCUTANEOUS
Qty: 6 EACH | Refills: 1 | Status: SHIPPED | OUTPATIENT
Start: 2025-08-25

## 2025-08-26 PROBLEM — M06.09 RHEUMATOID ARTHRITIS OF MULTIPLE SITES WITHOUT RHEUMATOID FACTOR (HCC): Status: ACTIVE | Noted: 2024-09-19

## 2025-08-26 PROBLEM — Z51.81 MEDICATION MONITORING ENCOUNTER: Status: ACTIVE | Noted: 2022-03-20

## (undated) DIAGNOSIS — D64.9 NORMOCYTIC ANEMIA: ICD-10-CM

## (undated) DIAGNOSIS — D75.839 THROMBOCYTOSIS: Primary | ICD-10-CM

## (undated) DIAGNOSIS — R31.9 HEMATURIA, UNSPECIFIED TYPE: Primary | ICD-10-CM

## (undated) DIAGNOSIS — R70.0 ESR RAISED: ICD-10-CM

## (undated) DIAGNOSIS — D64.9 NORMOCYTIC ANEMIA: Primary | ICD-10-CM

## (undated) DIAGNOSIS — R79.89 ELEVATED FERRITIN: ICD-10-CM

## (undated) DEVICE — HANDPIECE SET WITH HIGH FLOW TIP AND SUCTION TUBE: Brand: INTERPULSE

## (undated) DEVICE — Device: Brand: STABLECUT®

## (undated) DEVICE — MEDI-VAC NON-CONDUCTIVE SUCTION TUBING 6MM X 1.8M (6FT.) L: Brand: CARDINAL HEALTH

## (undated) DEVICE — T5 HOOD WITH PEEL AWAY FACE SHIELD

## (undated) DEVICE — SHEET,DRAPE,70X100,STERILE: Brand: MEDLINE

## (undated) DEVICE — GAMMEX® NON-LATEX PI ORTHO SIZE 8.5, STERILE POLYISOPRENE POWDER-FREE SURGICAL GLOVE: Brand: GAMMEX

## (undated) DEVICE — CONMED SCOPE SAVER BITE BLOCK, 20X27 MM: Brand: SCOPE SAVER

## (undated) DEVICE — SUTURE VICRYL 1-0 J977H

## (undated) DEVICE — SUTURE VICRYL 0 CP-1

## (undated) DEVICE — DRESSING AQUACEL AG SP 3.5X10

## (undated) DEVICE — PIN FX 1/8IN HDLS FLUT SQ END

## (undated) DEVICE — UNDYED BRAIDED (POLYGLACTIN 910), SYNTHETIC ABSORBABLE SUTURE: Brand: COATED VICRYL

## (undated) DEVICE — Device

## (undated) DEVICE — KIT CLEAN ENDOKIT 1.1OZ GOWNX2

## (undated) DEVICE — DRAPE SRG 70X60IN SPLT U IMPRV

## (undated) DEVICE — CHLORAPREP 26ML APPLICATOR

## (undated) DEVICE — NEEDLE HPO 18GA 1.5IN ECLPS

## (undated) DEVICE — TOTAL KNEE: Brand: MEDLINE INDUSTRIES, INC.

## (undated) DEVICE — SOLUTION SURG DURA PREP HAZMAT

## (undated) DEVICE — SOL  .9 3000ML

## (undated) DEVICE — SYRINGE MNJCT 35ML LF STRL LL

## (undated) DEVICE — 6 ML SYRINGE LUER-LOCK TIP: Brand: MONOJECT

## (undated) DEVICE — SOL  .9 1000ML BTL

## (undated) DEVICE — EXOFIN TISSUE ADHESIVE 1.0ML

## (undated) DEVICE — KIT ENDO ORCAPOD 160/180/190

## (undated) DEVICE — HOOD: Brand: FLYTE

## (undated) DEVICE — BLADE SW 29MM 58MM THK.64MM LG

## (undated) DEVICE — 2T11 #2 PDO 36 X 36: Brand: 2T11 #2 PDO 36 X 36

## (undated) DEVICE — BOWL CEMENT MIX QUICK-VAC

## (undated) DEVICE — SUTURE MONOCRYL 3-0 Y936H

## (undated) DEVICE — DRAPE SHEET LG

## (undated) DEVICE — GAMMEX® PI HYBRID SIZE 8, STERILE POWDER-FREE SURGICAL GLOVE, POLYISOPRENE AND NEOPRENE BLEND: Brand: GAMMEX

## (undated) DEVICE — SUTURE VICRYL 2-0 CT-1

## (undated) DEVICE — SUTURE VICRYL 2-0 CT-2

## (undated) DEVICE — DRAPE PACK CHEST & U BAR

## (undated) DEVICE — LINE MNTR ADLT SET O2 INTMD

## (undated) DEVICE — STERILE LATEX POWDER-FREE SURGICAL GLOVESWITH NITRILE COATING: Brand: PROTEXIS

## (undated) DEVICE — 35 ML SYRINGE REGULAR TIP: Brand: MONOJECT

## (undated) DEVICE — 3 ML SYRINGE LUER-LOCK TIP: Brand: MONOJECT

## (undated) DEVICE — FORCEP RADIAL JAW 4